# Patient Record
Sex: MALE | Race: WHITE | NOT HISPANIC OR LATINO | Employment: OTHER | URBAN - METROPOLITAN AREA
[De-identification: names, ages, dates, MRNs, and addresses within clinical notes are randomized per-mention and may not be internally consistent; named-entity substitution may affect disease eponyms.]

---

## 2017-01-06 PROBLEM — R93.89 ABNORMAL CT SCAN: Status: ACTIVE | Noted: 2017-01-06

## 2017-01-16 PROBLEM — C91.12 CLL (CHRONIC LYMPHOID LEUKEMIA) IN RELAPSE: Status: ACTIVE | Noted: 2017-01-16

## 2017-01-16 PROBLEM — D72.829 LEUKOCYTOSIS: Status: ACTIVE | Noted: 2017-01-16

## 2017-01-16 PROBLEM — N17.9 ACUTE KIDNEY INJURY: Status: ACTIVE | Noted: 2017-01-16

## 2017-01-16 PROBLEM — E86.1 INTRAVASCULAR VOLUME DEPLETION: Status: ACTIVE | Noted: 2017-01-16

## 2017-01-17 PROBLEM — E87.5 HYPERKALEMIA: Status: ACTIVE | Noted: 2017-01-17

## 2017-01-17 PROBLEM — E79.0 HYPERURICEMIA: Status: ACTIVE | Noted: 2017-01-17

## 2017-01-18 PROBLEM — E86.1 INTRAVASCULAR VOLUME DEPLETION: Status: RESOLVED | Noted: 2017-01-16 | Resolved: 2017-01-18

## 2017-02-01 PROBLEM — I10 HYPERTENSION: Status: ACTIVE | Noted: 2017-02-01

## 2017-02-21 ENCOUNTER — INFUSION (OUTPATIENT)
Dept: INFUSION THERAPY | Facility: HOSPITAL | Age: 77
End: 2017-02-21
Attending: INTERNAL MEDICINE
Payer: MEDICARE

## 2017-02-21 DIAGNOSIS — C91.10 LEUKEMIA, LYMPHOCYTIC, CHRONIC: Primary | ICD-10-CM

## 2017-02-21 PROCEDURE — 96523 IRRIG DRUG DELIVERY DEVICE: CPT | Mod: PN

## 2017-02-21 PROCEDURE — 25000003 PHARM REV CODE 250: Mod: PN | Performed by: INTERNAL MEDICINE

## 2017-02-21 RX ORDER — SODIUM CHLORIDE 0.9 % (FLUSH) 0.9 %
10 SYRINGE (ML) INJECTION
Status: DISCONTINUED | OUTPATIENT
Start: 2017-02-21 | End: 2017-02-21 | Stop reason: HOSPADM

## 2017-02-21 RX ORDER — HEPARIN 100 UNIT/ML
500 SYRINGE INTRAVENOUS
Status: COMPLETED | OUTPATIENT
Start: 2017-02-21 | End: 2017-02-21

## 2017-02-21 RX ORDER — SODIUM CHLORIDE 0.9 % (FLUSH) 0.9 %
10 SYRINGE (ML) INJECTION
Status: CANCELLED
Start: 2017-02-21

## 2017-02-21 RX ORDER — HEPARIN 100 UNIT/ML
500 SYRINGE INTRAVENOUS
Status: CANCELLED | OUTPATIENT
Start: 2017-02-21

## 2017-02-21 RX ADMIN — HEPARIN 500 UNITS: 100 SYRINGE at 11:02

## 2017-02-21 RX ADMIN — SODIUM CHLORIDE, PRESERVATIVE FREE 10 ML: 5 INJECTION INTRAVENOUS at 11:02

## 2017-03-08 ENCOUNTER — TELEPHONE (OUTPATIENT)
Dept: CARDIOLOGY | Facility: CLINIC | Age: 77
End: 2017-03-08

## 2017-03-08 NOTE — TELEPHONE ENCOUNTER
----- Message from Karen Sousa sent at 3/8/2017 10:57 AM CST -----  Contact: rafaela Delatorre calling for  Labs to be done prior to appt   Call back    Or

## 2017-03-08 NOTE — TELEPHONE ENCOUNTER
Spoke to pt, notified that labs are not needed prior to appt. Dr Clemente will orders labs at appt. Pt verbally understands

## 2017-03-23 PROBLEM — I49.9 ARRHYTHMIA: Status: ACTIVE | Noted: 2017-03-23

## 2017-03-23 PROBLEM — E78.00 HYPERCHOLESTEREMIA: Status: ACTIVE | Noted: 2017-03-23

## 2017-03-23 PROBLEM — I34.0 NON-RHEUMATIC MITRAL REGURGITATION: Status: ACTIVE | Noted: 2017-03-23

## 2017-03-23 PROBLEM — Z79.02 LONG TERM (CURRENT) USE OF ANTITHROMBOTICS/ANTIPLATELETS: Status: ACTIVE | Noted: 2017-03-23

## 2017-03-23 PROBLEM — I25.10 CORONARY ARTERY DISEASE INVOLVING NATIVE CORONARY ARTERY OF NATIVE HEART WITHOUT ANGINA PECTORIS: Status: ACTIVE | Noted: 2017-03-23

## 2017-03-27 ENCOUNTER — OFFICE VISIT (OUTPATIENT)
Dept: CARDIOLOGY | Facility: CLINIC | Age: 77
End: 2017-03-27
Payer: MEDICARE

## 2017-03-27 VITALS
HEART RATE: 71 BPM | HEIGHT: 69 IN | BODY MASS INDEX: 26.48 KG/M2 | WEIGHT: 178.81 LBS | SYSTOLIC BLOOD PRESSURE: 142 MMHG | DIASTOLIC BLOOD PRESSURE: 58 MMHG

## 2017-03-27 DIAGNOSIS — I49.9 CARDIAC ARRHYTHMIA, UNSPECIFIED CARDIAC ARRHYTHMIA TYPE: ICD-10-CM

## 2017-03-27 DIAGNOSIS — I25.10 CORONARY ARTERY DISEASE INVOLVING NATIVE CORONARY ARTERY OF NATIVE HEART WITHOUT ANGINA PECTORIS: Primary | ICD-10-CM

## 2017-03-27 DIAGNOSIS — E78.00 HYPERCHOLESTEREMIA: ICD-10-CM

## 2017-03-27 DIAGNOSIS — I10 ESSENTIAL HYPERTENSION: ICD-10-CM

## 2017-03-27 DIAGNOSIS — I34.0 NON-RHEUMATIC MITRAL REGURGITATION: ICD-10-CM

## 2017-03-27 PROCEDURE — 99213 OFFICE O/P EST LOW 20 MIN: CPT | Mod: PBBFAC,PO | Performed by: INTERNAL MEDICINE

## 2017-03-27 PROCEDURE — 99214 OFFICE O/P EST MOD 30 MIN: CPT | Mod: S$PBB,,, | Performed by: INTERNAL MEDICINE

## 2017-03-27 PROCEDURE — 99999 PR PBB SHADOW E&M-EST. PATIENT-LVL III: CPT | Mod: PBBFAC,,, | Performed by: INTERNAL MEDICINE

## 2017-03-27 RX ORDER — RAMIPRIL 1.25 MG/1
1.25 CAPSULE ORAL NIGHTLY
Qty: 90 CAPSULE | Refills: 1 | Status: SHIPPED | OUTPATIENT
Start: 2017-03-27 | End: 2017-09-21 | Stop reason: SDUPTHER

## 2017-03-27 RX ORDER — PRAVASTATIN SODIUM 10 MG/1
10 TABLET ORAL NIGHTLY
Qty: 90 TABLET | Refills: 1 | Status: SHIPPED | OUTPATIENT
Start: 2017-03-27 | End: 2017-11-07 | Stop reason: SDUPTHER

## 2017-03-27 NOTE — PATIENT INSTRUCTIONS
Understanding Coronary Artery Disease (CAD)    To understand coronary artery disease (CAD), you need to know how your heart works. Your heart is a muscle that pumps blood throughout your body. To work right, your heart needs a steady supply of oxygen. It gets this oxygen from blood supplied by the coronary arteries.        Healthy Artery      Damaged Artery      Narrowed Artery      Blocked Artery   Healthy artery. When a coronary artery is healthy and has no blockages, blood flows through easily. Healthy arteries can easily supply the oxygen-rich blood your heart needs.  Damaged artery. Coronary artery disease begins when damage to the artery lining leads to the buildup of fat-like substances and cholesterol along the artery wall. This is called plaque. This damage could be caused by things like high blood pressure or smoking. This plaque buildup begins to narrow the arteries carrying blood to the heart. This is called atherosclerosis,  Narrowed artery. As more plaque builds up, your artery has trouble supplying blood to your heart muscle when it needs it most, such as during exercise. You may not feel any symptoms when this happens. Or you may feel angina--pressure, tightness, achiness, or pain in your chest, jaw, neck, back, or arm.  Blocked artery. A piece of plaque may break off and completely block the artery. Or a blood clot may plug the narrowed artery. When this happens, blood flow is blocked from reaching the heart. Without oxygen-rich blood, part of the heart muscle becomes damaged and stops working. You may feel crushing pressure or pain in or around your chest. This is a heart attack (acute myocardial infarction, or AMI) and is a medical emergency.  Date Last Reviewed: 3/28/2016  © 6955-3976 [employer]. [address] [place] [address], [place] [address]. All rights reserved. This information is not intended as a substitute for professional medical care. Always follow your healthcare  professional's instructions.        Established High Blood Pressure    High blood pressure (hypertension) is a chronic disease. Often health care providers dont know what causes it. But it can be caused by certain health conditions and medicines.  If you have high blood pressure, you may not have any symptoms. If you do have symptoms, they may include headache, dizziness, changes in your vision, chest pain, and shortness of breath. But even without symptoms, high blood pressure thats not treated raises your risk for heart attack and stroke. High blood pressure is a serious health risk and shouldnt be ignored.  A blood pressure reading is made up of two numbers: a higher number over a lower number. The top number is the systolic pressure. The bottom number is the diastolic pressure. A normal blood pressure is less than 120 over less than 80.  High blood pressure is when either the top number is 140 or higher, or the bottom number is 90 or higher. This must be the result when taking your blood pressure a number of times. The blood pressures between normal and high are called prehypertension.  Home care  If you have high blood pressure, you should do what is listed below to lower your blood pressure. If you are taking medicines for high blood pressure, these methods may reduce or end your need for medicines in the future.  · Begin a weight-loss program if you are overweight.  · Cut back on how much salt you get in your diet. Heres how to do this:  ¨ Dont eat foods that have a lot of salt. These include olives, pickles, smoked meats, and salted potato chips.  ¨ Dont add salt to your food at the table.  ¨ Use only small amounts of salt when cooking.  · Begin an exercise program. Talk with your health care provider about the type of exercise program that would be best for you. It doesn't have to be hard. Even brisk walking for 20 minutes 3 times a week is a good form of exercise.  · Dont take medicines that have  heart stimulants. This includes many cold and sinus decongestant pills and sprays, as well as diet pills. Check the warnings about hypertension on the label. Stimulants such as amphetamine or cocaine could be lethal for someone with high blood pressure. Never take these.  · Limit how much caffeine you get in your diet. Switch to caffeine-free products.  · Stop smoking. If you are a long-time smoker, this can be hard. Enroll in a stop-smoking program to make it more likely that you will quit for good.  · Learn how to handle stress. This is an important part of any program to lower blood pressure. Learn about relaxation methods like meditation, yoga, or biofeedback.  · If your provider prescribed medicines, take them exactly as directed. Missing doses may cause your blood pressure get out of control.  · Consider buying an automatic blood pressure machine. You can get one of these at most pharmacies. Use this to watch your blood pressure at home. Give the results to your provider.  Follow-up care  You will need to make regular visits to your health care provider. This is to check your blood pressure and to make changes to your medicines. Make a follow-up appointment as directed.  When to seek medical advice  Call your health care provider right away if any of these occur:  · Chest pain or shortness of breath  · Severe headache  · Throbbing or rushing sound in the ears  · Nosebleed  · Sudden severe pain in your belly (abdomen)  · Extreme drowsiness, confusion, or fainting  · Dizziness or dizziness with a spinning sensation (vertigo)  · Weakness of an arm or leg or one side of the face  · You have problems speaking or seeing   Date Last Reviewed: 11/25/2014  © 8423-4682 [employer]. [address] [place] [address], [place] [address]. All rights reserved. This information is not intended as a substitute for professional medical care. Always follow your healthcare professional's instructions.        Understanding  Mitral Valve Regurgitation    Mitral valve regurgitation is when the mitral valve in the heart is leaky. It lets some blood flow backwards when the ventricle squeezes.  How mitral valve regurgitation happens  The mitral valve is one of the hearts 4 valves. Normally, these valves help the blood flow through the hearts 4 chambers and out to the body without leaking backwards. The mitral valve lies between the left atrium and the left ventricle. Normally, the mitral valve stops blood from flowing back into the left atrium from the left ventricle when the ventricle squeezes. This maximizes forward blood flow through the heart.  With mitral valve regurgitation, some blood leaks back through the valve. This makes the heart have to work harder to get blood out to your body. When this blood is regurgitated backwards in the heart, it increases the pressure within the heart which can lead to muscle damage as well as high blood pressure in the lungs.  Mitral valve regurgitation can increase risk for other heart rhythm problems, such as atrial fibrillation (AFib). This abnormal heart rhythm results in fast and irregular heartbeats which can also lower the heart's pumping ability and increases the risk for stroke.  Mitral valve regurgitation can be acute or chronic. If its acute, the valve suddenly becomes leaky. In this case, the heart doesnt have time to adapt to the leak in the valve. Symptoms are often severe. If its chronic, the valve leaks more and more over time. The heart has time to adapt to the leak.  What causes mitral valve regurgitation?  Mitral valve regurgitation can be caused by various things, such as:  · Heart attack or coronary artery disease  · Natural aging that can damage the mitral valve  · Tearing of the tissue or muscle that supports the mitral valve such as due to an injury  · A redundant or floppy mitral valve, called mitral valve prolapse  · Rheumatic heart disease caused by untreated  Streptococcus infection. Strep are the bacteria that cause strep throat.  · Certain autoimmune diseases, such as rheumatoid arthritis  · Infection of the heart valves (endocarditis)  · Problems with the mitral valve that are present at birth  · Certain medicines  You can reduce some risk factors for mitral valve regurgitation. For example:  · Use antibiotics as directed to treat a strep infection and prevent rheumatic heart disease.  · Reduce the risk for heart valve infection by not injecting illegal drugs.  · Manage risk factors that can lead to a heart attack or chronic heart artery disease.  There are other risk factors that you cant change. For example, some conditions that can lead to mitral valve regurgitation are partly genetic.  Symptoms of mitral valve regurgitation  Chronic mitral valve regurgitation often doesnt cause symptoms for a long time. Mild or moderate mitral regurgitation often doesnt cause any symptoms. If the condition becomes more severe, you may have symptoms. They may get worse and happen more often over time. Symptoms may include:  · Shortness of breath with physical activity  · Shortness of breath when lying flat  · Feeling tired  · Less ability to exercise  · Awareness of your heartbeat  · Swelling in your legs, abdomen, and the veins in your neck  · Chest pain (less common)  Acute, severe mitral valve regurgitation is a medical emergency that can cause serious symptoms such as:  · Symptoms of shock (pale skin, loss of consciousness, rapid breathing)  · Severe shortness of breath  · Arrhythmias that make the heart unable to pump blood well  Diagnosing mitral valve regurgitation  Your healthcare provider will ask about your health history. He or she will give you a physical exam. Using a stethoscope, your healthcare provider will listen to your heart. This is to check for sounds called heart murmurs and other signs that the heart isnt pumping normally. You may also have tests such  as:  · Echocardiogram, to look at the structure of the heart using sound waves  · Stress echocardiogram, to see how well the heart does during exercise  · Electrocardiogram (EKG), to check the hearts rhythm  · Cardiac MRI, transesophageal echocardiogram, or cardiac catheterization, if more information is needed  Date Last Reviewed: 6/1/2016  © 4775-3579 [employer]. [address] [place] [address]. All rights reserved. This information is not intended as a substitute for professional medical care. Always follow your healthcare professional's instructions.

## 2017-03-27 NOTE — PROGRESS NOTES
Subjective:    Patient ID:  Jan Kelly is a 76 y.o. male who presents for Coronary Artery Disease; Hypertension; and Hyperlipidemia      HPI discussed labs and goals, LDL  65, off losartan and simvastatin since started TX FOR CLL PER DR DEMARCO/ FRANCIS[facility] WHICH HAS MULTIPLE SE'S. , DOING OK,L SEE ROS    Past Medical History:   Diagnosis Date    Cancer     CLL    Cardiomyopathy     Chronic lymphocytic leukemia     Coronary artery disease     wih stents    Diabetes mellitus     Heart block     Heart murmur     Hyperlipidemia     Hypertension     Valvular regurgitation      Past Surgical History:   Procedure Laterality Date    CARDIAC CATHETERIZATION      CHOLECYSTECTOMY      COLONOSCOPY      COLONOSCOPY N/A 1/6/2017    Procedure: COLONOSCOPY;  Surgeon: Remy Pardo MD;  Location: [facility];  Service: Endoscopy;  Laterality: N/A;    CORONARY ANGIOPLASTY      CORONARY STENT PLACEMENT      PORTACATH PLACEMENT      TONSILLECTOMY       Family History   Problem Relation Age of Onset    Heart disease Father     Hypertension Father      Social History     Social History    Marital status:      Spouse name: N/A    Number of children: N/A    Years of education: N/A     Social History Main Topics    Smoking status: Former Smoker     Quit date: 1970    Smokeless tobacco: None    Alcohol use No      Comment: rarely    Drug use: No    Sexual activity: Not Asked     Other Topics Concern    None     Social History Narrative       Review of patient's allergies indicates:   Allergen Reactions    Penicillins     Titanium dioxide        Current Outpatient Prescriptions:     allopurinol (ZYLOPRIM) 300 MG tablet, Take 1 tablet (300 mg total) by mouth once daily., Disp: 30 tablet, Rfl: 2    aspirin (ECOTRIN) 81 MG EC tablet, Take 81 mg by mouth every morning. , Disp: , Rfl:     atenolol (TENORMIN) 25 MG tablet, Take 25 mg by mouth 2 (two) times daily. , Disp: , Rfl:     cyanocobalamin  (VITAMIN B-12) 1000 MCG tablet, Take 1,000 mcg by mouth once daily. , Disp: , Rfl:     HUMALOG KWIKPEN 100 unit/mL InPn pen, INJECT 1 UNIT UNDER THE SKIN PRF HIGH BLOOD SUGAR UTD, Disp: , Rfl: 3    hydrocodone-acetaminophen 10-325mg (NORCO)  mg Tab, Take 1 tablet by mouth nightly., Disp: , Rfl:     IMBRUVICA 140 mg Cap, , Disp: , Rfl:     pantoprazole (PROTONIX) 40 MG tablet, TK 1 T PO D 30 MIN B JIL, Disp: , Rfl: 3    simvastatin (ZOCOR) 20 MG tablet, Take 20 mg by mouth every evening., Disp: , Rfl:     Review of Systems   Constitution: Negative for chills, decreased appetite, diaphoresis, weakness, malaise/fatigue and night sweats. Weight loss: BETTER NOW.   HENT: Negative for congestion.    Eyes: Negative for blurred vision, discharge and visual disturbance.   Cardiovascular: Negative for chest pain, claudication, cyanosis, leg swelling, near-syncope, orthopnea, palpitations, paroxysmal nocturnal dyspnea and syncope. Dyspnea on exertion: MILD OCC. Irregular heartbeat: OCC.   Respiratory: Negative for cough, hemoptysis, sleep disturbances due to breathing, snoring, sputum production and wheezing.    Endocrine: Negative for cold intolerance and heat intolerance.   Hematologic/Lymphatic: Negative for adenopathy. Does not bruise/bleed easily.   Skin: Negative for color change, itching and nail changes.   Musculoskeletal: Positive for stiffness. Negative for back pain and falls.   Gastrointestinal: Negative for bloating, abdominal pain, constipation, dysphagia, flatus, heartburn, hematemesis, jaundice and melena.   Genitourinary: Negative for frequency, hematuria and incomplete emptying.   Neurological: Negative for brief paralysis, difficulty with concentration, dizziness, focal weakness, light-headedness, loss of balance, numbness, paresthesias and tremors.   Psychiatric/Behavioral: Negative for altered mental status, memory loss and substance abuse. The patient is not nervous/anxious.   "  Allergic/Immunologic: Negative for persistent infections.        Objective:      Vitals:    03/27/17 1321   BP: (!) 142/58   Pulse: 71   Weight: 81.1 kg (178 lb 12.7 oz)   Height: 5' 9" (1.753 m)   PainSc: 0-No pain     Body mass index is 26.4 kg/(m^2).    Physical Exam   Constitutional: He is oriented to person, place, and time. He appears well-developed and well-nourished.   HENT:   Head: Normocephalic and atraumatic.   Mouth/Throat: Oropharynx is clear and moist.   Eyes: Conjunctivae and EOM are normal. Pupils are equal, round, and reactive to light.   Neck: Neck supple. Normal carotid pulses, no hepatojugular reflux and no JVD present. Carotid bruit is not present. No tracheal deviation present. No thyromegaly present.   Cardiovascular: Normal rate and regular rhythm.  Exam reveals no gallop, no distant heart sounds, no friction rub and no midsystolic click.    Murmur heard.  High-pitched blowing early systolic murmur is present with a grade of 1/6  at the apex  Pulses:       Carotid pulses are 2+ on the right side, and 2+ on the left side.       Radial pulses are 2+ on the right side, and 2+ on the left side.        Femoral pulses are 2+ on the right side, and 2+ on the left side.       Popliteal pulses are 2+ on the right side, and 2+ on the left side.        Dorsalis pedis pulses are 2+ on the right side, and 2+ on the left side.        Posterior tibial pulses are 2+ on the right side, and 2+ on the left side.   Pulmonary/Chest: Effort normal and breath sounds normal. No tachypnea and no bradypnea.   Abdominal: Soft. Bowel sounds are normal. He exhibits no distension and no mass. There is no tenderness.   Musculoskeletal: Normal range of motion. He exhibits no edema.   Lymphadenopathy:     He has no cervical adenopathy.     He has no axillary adenopathy.   Neurological: He is alert and oriented to person, place, and time. No cranial nerve deficit. Coordination normal.   Skin: Skin is warm and dry. No " erythema.   Psychiatric: He has a normal mood and affect. His speech is normal. Judgment and thought content normal. He is agitated.               ..    Chemistry        Component Value Date/Time     03/17/2017 0900    K 4.2 03/17/2017 0900     03/17/2017 0900    CO2 30 03/17/2017 0900    BUN 16 03/17/2017 0900    CREATININE 0.94 03/17/2017 0900    GLU 87 03/17/2017 0900        Component Value Date/Time    CALCIUM 9.1 03/17/2017 0900    ALKPHOS 77 03/17/2017 0900    AST 18 03/17/2017 0900    AST 33 02/18/2016 1158    ALT 16 03/17/2017 0900    BILITOT 0.7 03/17/2017 0900            ..  Lab Results   Component Value Date    CHOL 114 (L) 03/17/2017    CHOL 135 08/19/2016    CHOL 164 05/16/2016     Lab Results   Component Value Date    HDL 40 03/17/2017    HDL 32 (L) 08/19/2016    HDL 39 (L) 05/16/2016     Lab Results   Component Value Date    LDLCALC 65.8 03/17/2017    LDLCALC 85.4 08/19/2016    LDLCALC 108.0 05/16/2016     Lab Results   Component Value Date    TRIG 41 03/17/2017    TRIG 88 08/19/2016    TRIG 85 05/16/2016     Lab Results   Component Value Date    CHOLHDL 35.1 03/17/2017    CHOLHDL 23.7 08/19/2016    CHOLHDL 23.8 05/16/2016     ..  Lab Results   Component Value Date    .00 (HH) 03/17/2017    HGB 9.7 (L) 03/17/2017    HCT 34.4 (L) 03/17/2017     (H) 03/17/2017    PLT 73 (L) 03/17/2017       Test(s) Reviewed  I have reviewed the following in detail:  [] Stress test   [] Angiography   [] Echocardiogram   [x] Labs   [x] Other:       Assessment:         ICD-10-CM ICD-9-CM   1. Coronary artery disease involving native coronary artery of native heart without angina pectoris I25.10 414.01   2. Essential hypertension I10 401.9   3. Non-rheumatic mitral regurgitation I34.0 424.0   4. Cardiac arrhythmia, unspecified cardiac arrhythmia type I49.9 427.9   5. Hypercholesteremia E78.00 272.0     Problem List Items Addressed This Visit        Cardiology Problems    Essential hypertension     Coronary artery disease involving native coronary artery of native heart without angina pectoris - Primary    Non-rheumatic mitral regurgitation    Hypercholesteremia    Arrhythmia           Plan:         ADD LOW DOSE ACE-I AND PRAVACHOL FOR CAD, HTN, , ALL OTHER CV CLINICALLY STABLE, NO ANGINA OR OCC , NO HF, NO TIA, NO CLINICAL ARRHYTHMIA,CONTINUE CURRENT MEDS, EDUCATION, DIET, EXERCISE  RTC IN 6 MO WITH LABS, BMP IN 3 WEEKS,  Coronary artery disease involving native coronary artery of native heart without angina pectoris    Essential hypertension    Non-rheumatic mitral regurgitation    Cardiac arrhythmia, unspecified cardiac arrhythmia type    Hypercholesteremia    RTC Low level/low impact aerobic exercise 5x's/wk. Heart healthy diet and risk factor modification.    See labs and med orders.    Aerobic exercise 5x's/wk. Heart healthy diet and risk factor modification.    See labs and med orders.

## 2017-03-27 NOTE — PROGRESS NOTES
Subjective:    Patient ID:  Jan Kelly is a 76 y.o. male who presents for Coronary Artery Disease; Hypertension; and Hyperlipidemia      HPI    Past Medical History:   Diagnosis Date    Cancer     CLL    Cardiomyopathy     Chronic lymphocytic leukemia     Coronary artery disease     wih stents    Diabetes mellitus     Heart block     Heart murmur     Hyperlipidemia     Hypertension     Valvular regurgitation      Past Surgical History:   Procedure Laterality Date    CARDIAC CATHETERIZATION      CHOLECYSTECTOMY      COLONOSCOPY      COLONOSCOPY N/A 1/6/2017    Procedure: COLONOSCOPY;  Surgeon: Remy Pardo MD;  Location: [facility];  Service: Endoscopy;  Laterality: N/A;    CORONARY ANGIOPLASTY      CORONARY STENT PLACEMENT      PORTACATH PLACEMENT      TONSILLECTOMY       Family History   Problem Relation Age of Onset    Heart disease Father     Hypertension Father      Social History     Social History    Marital status:      Spouse name: N/A    Number of children: N/A    Years of education: N/A     Social History Main Topics    Smoking status: Former Smoker     Quit date: 1970    Smokeless tobacco: None    Alcohol use No      Comment: rarely    Drug use: No    Sexual activity: Not Asked     Other Topics Concern    None     Social History Narrative       Review of patient's allergies indicates:   Allergen Reactions    Penicillins     Titanium dioxide        Current Outpatient Prescriptions:     allopurinol (ZYLOPRIM) 300 MG tablet, Take 1 tablet (300 mg total) by mouth once daily., Disp: 30 tablet, Rfl: 2    aspirin (ECOTRIN) 81 MG EC tablet, Take 81 mg by mouth every morning. , Disp: , Rfl:     atenolol (TENORMIN) 25 MG tablet, Take 25 mg by mouth 2 (two) times daily. , Disp: , Rfl:     cyanocobalamin (VITAMIN B-12) 1000 MCG tablet, Take 1,000 mcg by mouth once daily. , Disp: , Rfl:     HUMALOG KWIKPEN 100 unit/mL InPn pen, INJECT 1 UNIT UNDER THE SKIN PRF  "HIGH BLOOD SUGAR UTD, Disp: , Rfl: 3    hydrocodone-acetaminophen 10-325mg (NORCO)  mg Tab, Take 1 tablet by mouth nightly., Disp: , Rfl:     IMBRUVICA 140 mg Cap, , Disp: , Rfl:     pantoprazole (PROTONIX) 40 MG tablet, TK 1 T PO D 30 MIN B JIL, Disp: , Rfl: 3    simvastatin (ZOCOR) 20 MG tablet, Take 20 mg by mouth every evening., Disp: , Rfl:     ROS     Objective:      Vitals:    03/27/17 1321   BP: (!) 142/58   Pulse: 71   Weight: 81.1 kg (178 lb 12.7 oz)   Height: 5' 9" (1.753 m)   PainSc: 0-No pain     Body mass index is 26.4 kg/(m^2).    Physical Exam   Constitutional: He is oriented to person, place, and time. He appears well-developed and well-nourished.   HENT:   Head: Normocephalic and atraumatic.   Mouth/Throat: Oropharynx is clear and moist.   Eyes: Conjunctivae and EOM are normal. Pupils are equal, round, and reactive to light.   Neck: Neck supple. Normal carotid pulses, no hepatojugular reflux and no JVD present. Carotid bruit is not present. No tracheal deviation present. Edema: TRACE  No thyromegaly present.   Cardiovascular: Normal rate and regular rhythm.  Exam reveals no gallop, no distant heart sounds, no friction rub and no midsystolic click.    Murmur heard.   Medium-pitched blowing early systolic murmur is present with a grade of 1/6  at the apex  Pulses:       Carotid pulses are 2+ on the right side, and 2+ on the left side.       Radial pulses are 2+ on the right side, and 2+ on the left side.        Femoral pulses are 2+ on the right side, and 2+ on the left side.       Popliteal pulses are 2+ on the right side, and 2+ on the left side.        Dorsalis pedis pulses are 2+ on the right side, and 2+ on the left side.        Posterior tibial pulses are 2+ on the right side, and 2+ on the left side.   Pulmonary/Chest: Effort normal and breath sounds normal. No tachypnea and no bradypnea.   Abdominal: Soft. Bowel sounds are normal. He exhibits no distension and no mass. There is no " hepatosplenomegaly. There is no tenderness. There is no CVA tenderness.   Musculoskeletal: Normal range of motion. He exhibits no edema.   Lymphadenopathy:     He has no cervical adenopathy.     He has no axillary adenopathy.   Neurological: He is alert and oriented to person, place, and time. He has normal strength. No cranial nerve deficit. Coordination normal.   Skin: Skin is warm and dry. No cyanosis or erythema. No pallor.   Psychiatric: He has a normal mood and affect. His speech is normal and behavior is normal. Judgment and thought content normal.               ..    Chemistry        Component Value Date/Time     03/17/2017 0900    K 4.2 03/17/2017 0900     03/17/2017 0900    CO2 30 03/17/2017 0900    BUN 16 03/17/2017 0900    CREATININE 0.94 03/17/2017 0900    GLU 87 03/17/2017 0900        Component Value Date/Time    CALCIUM 9.1 03/17/2017 0900    ALKPHOS 77 03/17/2017 0900    AST 18 03/17/2017 0900    AST 33 02/18/2016 1158    ALT 16 03/17/2017 0900    BILITOT 0.7 03/17/2017 0900            ..  Lab Results   Component Value Date    CHOL 114 (L) 03/17/2017    CHOL 135 08/19/2016    CHOL 164 05/16/2016     Lab Results   Component Value Date    HDL 40 03/17/2017    HDL 32 (L) 08/19/2016    HDL 39 (L) 05/16/2016     Lab Results   Component Value Date    LDLCALC 65.8 03/17/2017    LDLCALC 85.4 08/19/2016    LDLCALC 108.0 05/16/2016     Lab Results   Component Value Date    TRIG 41 03/17/2017    TRIG 88 08/19/2016    TRIG 85 05/16/2016     Lab Results   Component Value Date    CHOLHDL 35.1 03/17/2017    CHOLHDL 23.7 08/19/2016    CHOLHDL 23.8 05/16/2016     ..  Lab Results   Component Value Date    .00 (HH) 03/17/2017    HGB 9.7 (L) 03/17/2017    HCT 34.4 (L) 03/17/2017     (H) 03/17/2017    PLT 73 (L) 03/17/2017       Test(s) Reviewed  I have reviewed the following in detail:  [] Stress test   [] Angiography   [] Echocardiogram   [] Labs   [] Other:       Assessment:         ICD-10-CM  ICD-9-CM   1. Coronary artery disease involving native coronary artery of native heart without angina pectoris I25.10 414.01   2. Essential hypertension I10 401.9   3. Non-rheumatic mitral regurgitation I34.0 424.0   4. Cardiac arrhythmia, unspecified cardiac arrhythmia type I49.9 427.9   5. Hypercholesteremia E78.00 272.0     Problem List Items Addressed This Visit        Cardiology Problems    Essential hypertension    Coronary artery disease involving native coronary artery of native heart without angina pectoris - Primary    Non-rheumatic mitral regurgitation    Hypercholesteremia    Arrhythmia           Plan:     START LOW DOSE RAMIPRIL AND PRAVACHOL, EDUCATION, ALL CV CLINICALLY STABLE, NO ANGINA OR RARE , NO HF, NO TIA, NO CLINICAL ARRHYTHMIA,CONTINUE CURRENT MEDS, EDUCATION, DIET, EXERCISE  RTC IN 6 MO WITH LABS      Coronary artery disease involving native coronary artery of native heart without angina pectoris    Essential hypertension    Non-rheumatic mitral regurgitation    Cardiac arrhythmia, unspecified cardiac arrhythmia type    Hypercholesteremia    RTC Low level/low impact aerobic exercise 5x's/wk. Heart healthy diet and risk factor modification.    See labs and med orders.    Aerobic exercise 5x's/wk. Heart healthy diet and risk factor modification.    See labs and med orders.

## 2017-03-30 ENCOUNTER — INFUSION (OUTPATIENT)
Dept: INFUSION THERAPY | Facility: HOSPITAL | Age: 77
End: 2017-03-30
Attending: INTERNAL MEDICINE
Payer: MEDICARE

## 2017-03-30 DIAGNOSIS — C91.10 LEUKEMIA, LYMPHOCYTIC, CHRONIC: Primary | ICD-10-CM

## 2017-03-30 PROCEDURE — 25000003 PHARM REV CODE 250: Mod: PN | Performed by: INTERNAL MEDICINE

## 2017-03-30 PROCEDURE — 96523 IRRIG DRUG DELIVERY DEVICE: CPT | Mod: PN

## 2017-03-30 RX ORDER — HEPARIN 100 UNIT/ML
500 SYRINGE INTRAVENOUS
Status: CANCELLED | OUTPATIENT
Start: 2017-03-30

## 2017-03-30 RX ORDER — HEPARIN 100 UNIT/ML
500 SYRINGE INTRAVENOUS
Status: COMPLETED | OUTPATIENT
Start: 2017-03-30 | End: 2017-03-30

## 2017-03-30 RX ORDER — SODIUM CHLORIDE 0.9 % (FLUSH) 0.9 %
10 SYRINGE (ML) INJECTION
Status: DISCONTINUED | OUTPATIENT
Start: 2017-03-30 | End: 2017-03-30 | Stop reason: HOSPADM

## 2017-03-30 RX ORDER — SODIUM CHLORIDE 0.9 % (FLUSH) 0.9 %
10 SYRINGE (ML) INJECTION
Status: CANCELLED
Start: 2017-03-30

## 2017-03-30 RX ADMIN — SODIUM CHLORIDE, PRESERVATIVE FREE 10 ML: 5 INJECTION INTRAVENOUS at 12:03

## 2017-03-30 RX ADMIN — HEPARIN 500 UNITS: 100 SYRINGE at 12:03

## 2017-05-08 ENCOUNTER — OFFICE VISIT (OUTPATIENT)
Dept: CARDIOLOGY | Facility: CLINIC | Age: 77
End: 2017-05-08
Payer: MEDICARE

## 2017-05-08 VITALS
BODY MASS INDEX: 26.48 KG/M2 | HEART RATE: 71 BPM | DIASTOLIC BLOOD PRESSURE: 69 MMHG | HEIGHT: 69 IN | SYSTOLIC BLOOD PRESSURE: 156 MMHG | WEIGHT: 178.81 LBS

## 2017-05-08 DIAGNOSIS — R60.0 ARM EDEMA: Primary | ICD-10-CM

## 2017-05-08 DIAGNOSIS — I10 ESSENTIAL HYPERTENSION: ICD-10-CM

## 2017-05-08 PROCEDURE — 99213 OFFICE O/P EST LOW 20 MIN: CPT | Mod: PBBFAC,PO | Performed by: INTERNAL MEDICINE

## 2017-05-08 PROCEDURE — 99999 PR PBB SHADOW E&M-EST. PATIENT-LVL III: CPT | Mod: PBBFAC,,, | Performed by: INTERNAL MEDICINE

## 2017-05-08 PROCEDURE — 99213 OFFICE O/P EST LOW 20 MIN: CPT | Mod: S$PBB,,, | Performed by: INTERNAL MEDICINE

## 2017-05-08 NOTE — PATIENT INSTRUCTIONS
Taking Your Blood Pressure  Blood pressure is the force of blood against the artery wall as it moves from the heart through the blood vessels. You can take your own blood pressure reading using a digital monitor. Take readings as often as your healthcare provider instructs. Take each reading at the same time of day.  Step 1. Relax    · Take your blood pressure at the same time every day, such as in the morning or evening, or at the time your healthcare provider recommends.  · Wait at least a half-hour after smoking, eating, drinking caffeinated beverages, or exercising.  · Sit comfortably at a table with both feet on the floor. Do not cross your legs or feet. Place the monitor near you.  · Rest for a few minutes before you begin.  Step 2. Wrap the cuff    · Place your arm on the table, palm up. Your arm should be at the level of your heart. Wrap the cuff around your upper arm, just above your elbow. Its best done on bare skin, not over clothing. Most cuffs will indicate where the brachial artery (the blood vessel in the middle of the arm at the inner side of the elbow) should line up with the cuff. Look in your monitor's instruction booklet for an illustration. You can also bring your cuff to your healthcare provider and have them show you how to correctly place the cuff.  · Make sure your cuff fits. If it doesnt wrap around your upper arm, order a larger cuff.  Step 3. Inflate the cuff    · Pump the cuff until the scale reads 160. If you have a self-inflating cuff, push the button that starts the pump.  · The cuff will tighten, then loosen.  · The numbers will change. When they stop changing, your blood pressure reading will appear.  · Take 2 or 3 readings one minute apart.  Step 4. Write down the results of each reading    · Write down your blood pressure numbers for each reading. Note the date and time. Keep your results in one place, such as a notebook. Even if your monitor has a built-in memory, keep a hard  copy of the readings.  · Remove the cuff from your arm. Turn off the machine.  · Share your blood pressure records with your healthcare providers at each visit.  Date Last Reviewed: 4/27/2016 © 2000-2016 [employer]. [address] [place] [address], [place] [address]. All rights reserved. This information is not intended as a substitute for professional medical care. Always follow your healthcare professional's instructions.        Heart Disease Education    The heart beats 60 to 100 times per minute, 24 hours a day. This equals almost 1000,000 times a day. It pumps blood with oxygen and nutrients to the tissues and organs of the body. But the heart is a muscle and needs its own supply of blood. Blood flow to the heart is supplied by the coronary arteries. Coronary artery disease (atherosclerosis) is a result of cholesterol, saturated fat, and calcium deposits (plaques) that build up inside the walls. This causes inflammation within the coronary arteries. These plaques narrow the artery and reduce blood flow to the heart muscle. The reduction in blood flow to the heart muscle decreases oxygen supply to the heart. If the narrowing is significant enough, the oxygen supply to one or more regions of the heart can be temporarily or permanently shut down. This can cause chest pain, and possibly death of heart tissue (heart attack).  Types of chest pain  Angina is the name for pain in the heart muscle. Angina is a warning sign of serious heart disease. When untreated it can lead to a heart attack, also known as acute myocardial infarction, or AMI. Angina occurs when there is not enough blood and oxygen flowing to the heart for the amount of work it is doing. This most often happens during physical exertion, when the heart is working hardest. It is usually relieved by rest or nitroglycerin. Angina may also occur after a large meal when extra blood is sent to the digestive organs and less goes to the heart. In the case  of advanced or unstable heart disease, angina can occur at rest or awaken you from sleep. Angina usually lasts from a few minutes up to 20 minutes or more. When treated early, the effects of angina can be reversed without permanent damage to the heart. Angina is a serious condition and needs to be evaluated by a medical professional immediately.  There are two types of angina -- stable and unstable:  · Stable angina usually occurs with a predictable level of activity. Being stable, its character, severity, and occurrence do not change much over time. It usually starts with activity, and resolves with rest or taking your medicine as instructed by your doctor. The symptoms usually do not last long.  · Unstable angina changes or gets worse over time. It is different from whatever you are used to. It may feel different or worse, begin without cause, occur with exercise or exertion, wake you up from sleep, and last longer. It may not respond in the same way as it does when you take your usual medicines for an attack. This type of angina can be a warning sign of an impending heart attack.     A heart attack is usually the result of a blood clot that suddenly forms in a coronary artery that has been narrowed with plaque. When this occurs, blood flow may be cut off to a part of the heart muscle, causing the cells to die. This weakens the pumping action of the heart, which affects the delivery of blood to all the other organs in the body including the brain. This damage is not reversible. However, early treatment can limit the amount of damage.  The pain you feel with angina and a heart attack may have a similar quality. However, it is usually different in intensity and duration. Here are some typical descriptions of a heart attack:  · It is most often experienced as a squeezing, crushing, pressure-like sensation in the center of the chest.  · It is sometimes described as something heavy sitting on my chest.  · It may feel  "more like a bad case of indigestion.  · The pain may spread from the chest to the arm, shoulder, throat or jaw.  · Sometimes the pain is not felt in the chest at all, but only in the arm, shoulder, throat or jaw.  · There may also be nausea, vomiting, dizziness or light-headedness, sweating and trouble breathing.  · Palpitations, or your heart beating rapidly  · A new, irregular heart beat  · Unexplained weakness  You may not be able to tell the difference between "bad" angina and a heart attack at home. Seek help if your symptoms are different than usual. Do not be in denial or just try to "tough it out."  Call 911  This is the fastest and safest way to get to the emergency department. The paramedics can also start treatment on the way to the hospital, saving valuable time for your heart.  · If the angina gets worse, if it continues, or if it stops and returns, call 911 immediately. Do not delay. You may be having a heart attack.  · After you call 911, take a second tablet or spray unless instructed otherwise. When repeating doses, sit down if possible, because it can make you feel lightheaded or dizzy. Wait another 5 minutes. If the angina still does not go away, take a third tablet or spray. Do not take more than 3 tablets or sprays within 15 minutes. Stay on the phone with 911 for further instruction.  · Your healthcare provider may give you slightly different instructions than those above. If so, follow them carefully.  Do not wait until symptoms become severe to call 911.  Other reasons to call 911 include:  · Trouble breathing  · Feeling lightheaded, faint, or dizzy  · Rapid heart beat  · Slower than usual heart rate compared to your normal  · Angina with weakness, dizziness, fainting, heavy sweating, nausea, or vomiting  · Extreme drowsiness, confusion  · Weakness of an arm or leg or one side of the face  · Difficulty with speech or vision  When to seek medical care  Remember, the signs and symptoms of a " "heart attack are not always like they are on TV. Sometimes they are not so obvious. You may only feel weak, or just not right. If it is not clear or if you have any doubt, call for advice.  · Seek help if there is a change in the type of pain, if it feels different, or if your symptoms are mild.  · Do not drive yourself. Have someone else drive you. If no one can drive, call 911.  · Do not delay. Fast diagnosis and treatment can prevent or limit the amount of heart damage during a heart attack.  · Do not go to your doctor's office or a clinic as they may not be able to provide all the testing and treatment required for this condition.  · If your doctor has given you medicine to take when symptoms occur, take them but don't delay getting help trying to locate medicines.  What happens in the emergency department  The emergency department is connected to your local emergency medical system (EMS) through 911. That's why during a cardiac emergency, calling 911 is the fastest way to get help. The goal of the emergency department is to rapidly screen, evaluate, and treat people.  Once you are there, an electrocardiogram (ECG or heart tracing) will be done. Blood samples may be taken to look for the presence of heart enzymes that leak from damaged heart cells and show if a heart attack is occurring. You will often be evaluated by a heart specialist (cardiologist) who decides the best course of action. In the case of severe angina or early heart attack, and depending on the circumstances, powerful "clot busting" medicines can be used to dissolve blood clots in the coronary artery. In other cases, you may be taken to a cardiac catheterization lab. Here, a tiny balloon-tipped catheter is advanced through blood vessels to the heart. There the balloon is inflated pushing open the blood vessel restoring blood flow.  Risk factors for heart disease  Risk factors for heart disease are a combination of genetic and lifestyle. Many " risk factors work by either directly or indirectly damaging the blood vessels of the heart, or by increasing the risk of forming blood or cholesterol clots, which then clog up and block the arteries.     Examples of physical lifestyle risk factors:  · Cigarette smoking  · High blood pressure  · High blood cholesterol  · Use of stimulant drugs such as cocaine, crack, and amphetamines  · Eating a high-fat, high-cholesterol meal  · Diabetes   · Obesity which increases risk for diabetes and high blood pressure  · Lack of regular physical activity     Examples of emotional lifestyle factors:  · Chronic high stress levels release stress hormones. These raise blood pressure and cholesterol level and makes blood clot more easily.  · Held-in anger, hostile or cynical attitude  · Social and emotional isolation, lack of intimacy  · Loss of relationship  · Depression  Other factors that increase the risk of heart attack that you cannot control :  · Age. The older you get beyond 40, the greater is your risk of significant coronary artery disease.  · Gender. More men than women get heart disease; but once past menopause, women who are not taking estrogen replacement have the same risk as men for a heart attack.  · Family history. If your mother, father, brother or sister has coronary artery disease, your risk of having it is higher than a person your age without this family history.  What can you do to decrease your risk  To reduce your risk of heart disease:  · Get regular checkups with your doctor.  · Take your medicines for blood pressure, cholesterol or diabetes as directed.  · Watch your diet. Eat a heart healthy diet choosing fresh foods, less salt, cholesterol, and fat  · Stop smoking. Get help if needed.  · Get regular exercise.  · Manage stress.  · Carry a list of medicines and doses in your wallet.  Date Last Reviewed: 12/30/2015  © 4752-8235 [employer]. [address] [place] [address], [place] [address]. All  rights reserved. This information is not intended as a substitute for professional medical care. Always follow your healthcare professional's instructions.

## 2017-05-08 NOTE — PROGRESS NOTES
Subjective:    Patient ID:  Jan Kelly is a 76 y.o. male who presents for Coronary Artery Disease and Arm Swelling      HPI  REQUESTED OV, R ARM EDEMA/HAND, WORSE FOR 2 WEEKS, STATES ? SINCE STARTED IMBRUVICA FOR LEUKEMIA, WENT TO ER IN FEB, R ARM FEELS HEAVY,  SAW DR DEMARCO ONCOLOGIST, TO SEE AGAIN THIS WEEK, HAS BEEN OFF LOSARTAN, BP UP,HAD TROUBLE WITH VEIN DRAWING IN ER/ R ARM, ALSO HAS BEEN HAVING  ARM PAIN, SEE ROS  Past Medical History:   Diagnosis Date    Cancer     CLL    Cardiomyopathy     Chronic lymphocytic leukemia     Coronary artery disease     wih stents    Diabetes mellitus     Heart block     Heart murmur     Hyperlipidemia     Hypertension     Valvular regurgitation      Past Surgical History:   Procedure Laterality Date    CARDIAC CATHETERIZATION      CHOLECYSTECTOMY      COLONOSCOPY      COLONOSCOPY N/A 1/6/2017    Procedure: COLONOSCOPY;  Surgeon: Remy Pardo MD;  Location: [facility];  Service: Endoscopy;  Laterality: N/A;    CORONARY ANGIOPLASTY      CORONARY STENT PLACEMENT      PORTACATH PLACEMENT      TONSILLECTOMY       Family History   Problem Relation Age of Onset    Heart disease Father     Hypertension Father      Social History     Social History    Marital status:      Spouse name: N/A    Number of children: N/A    Years of education: N/A     Social History Main Topics    Smoking status: Former Smoker     Quit date: 1970    Smokeless tobacco: Never Used    Alcohol use No      Comment: rarely    Drug use: No    Sexual activity: Not Asked     Other Topics Concern    None     Social History Narrative       Review of patient's allergies indicates:   Allergen Reactions    Penicillins     Titanium dioxide        Current Outpatient Prescriptions:     aspirin (ECOTRIN) 81 MG EC tablet, Take 81 mg by mouth every morning. , Disp: , Rfl:     atenolol (TENORMIN) 25 MG tablet, Take 25 mg by mouth 2 (two) times daily. , Disp: , Rfl:      HUMALOG KWIKPEN 100 unit/mL InPn pen, INJECT 1 UNIT UNDER THE SKIN PRF HIGH BLOOD SUGAR UTD, Disp: , Rfl: 3    hydrocodone-acetaminophen 10-325mg (NORCO)  mg Tab, Take 1 tablet by mouth nightly., Disp: 40 tablet, Rfl: 0    IMBRUVICA 140 mg Cap, Take 3 capsules by mouth once daily at 6am. , Disp: , Rfl:     pantoprazole (PROTONIX) 40 MG tablet, TK 1 T PO D 30 MIN B JIL, Disp: , Rfl: 3    pravastatin (PRAVACHOL) 10 MG tablet, Take 1 tablet (10 mg total) by mouth every evening., Disp: 90 tablet, Rfl: 1    cyanocobalamin (VITAMIN B-12) 1000 MCG tablet, Take 1,000 mcg by mouth once daily. , Disp: , Rfl:     ramipril (ALTACE) 1.25 MG capsule, Take 1 capsule (1.25 mg total) by mouth every evening., Disp: 90 capsule, Rfl: 1    Review of Systems   Constitution: Negative for chills, decreased appetite, diaphoresis, fever, weakness, malaise/fatigue and night sweats. Weight loss: BETTER NOW.   HENT: Negative for congestion.    Eyes: Negative for blurred vision and visual disturbance.   Cardiovascular: Negative for chest pain, claudication, cyanosis, near-syncope, orthopnea, palpitations, paroxysmal nocturnal dyspnea and syncope. Dyspnea on exertion: MILD OCC. Irregular heartbeat: OCC. Leg swelling: R ARM EDEMA, occ legs.   Respiratory: Negative for cough, hemoptysis, sleep disturbances due to breathing, snoring, sputum production and wheezing.    Endocrine: Negative for cold intolerance and heat intolerance.   Hematologic/Lymphatic: Negative for bleeding problem. Adenopathy: IMPROVED WITH TX. Does not bruise/bleed easily.   Skin: Negative for color change, itching and nail changes.   Musculoskeletal: Negative for back pain and falls. Arthritis: mild.   Gastrointestinal: Negative for bloating, abdominal pain, constipation, dysphagia, flatus, heartburn, hematemesis, jaundice and melena.   Genitourinary: Negative for frequency, hematuria and incomplete emptying.   Neurological: Negative for brief paralysis, difficulty  "with concentration, dizziness (occ), focal weakness, light-headedness, loss of balance, numbness, paresthesias and tremors.   Psychiatric/Behavioral: Negative for altered mental status, memory loss and substance abuse. The patient is not nervous/anxious.    Allergic/Immunologic: Negative for persistent infections.        Objective:      Vitals:    05/08/17 1432   BP: (!) 156/69   Pulse: 71   Weight: 81.1 kg (178 lb 12.7 oz)   Height: 5' 9" (1.753 m)   PainSc:   4   PainLoc: Hand     Body mass index is 26.4 kg/(m^2).    Physical Exam   Constitutional: He is oriented to person, place, and time. He appears well-developed and well-nourished.   HENT:   Head: Normocephalic and atraumatic.   Mouth/Throat: Oropharynx is clear and moist.   Eyes: Conjunctivae and EOM are normal. Pupils are equal, round, and reactive to light.   Neck: Neck supple. Normal carotid pulses, no hepatojugular reflux and no JVD present. Carotid bruit is not present. No tracheal deviation present. No thyromegaly present.   Cardiovascular: Normal rate and regular rhythm.  Exam reveals no gallop, no distant heart sounds, no friction rub and no midsystolic click.      High-pitched blowing early systolic murmur is present  at the apex R ARM +2 EDEMA, MINIMAL ERYTHEMA  Pulses:       Carotid pulses are 2+ on the right side, and 2+ on the left side.       Radial pulses are 2+ on the right side, and 2+ on the left side.        Femoral pulses are 2+ on the right side, and 2+ on the left side.       Dorsalis pedis pulses are 2+ on the right side, and 2+ on the left side.        Posterior tibial pulses are 2+ on the right side, and 2+ on the left side.   NORMAL CAPILLARY REFILL, NO ISCHEMIA, EDEMA BELOW ELBOW , MOSTLY IN HAND   Pulmonary/Chest: Effort normal and breath sounds normal. No tachypnea and no bradypnea.   Abdominal: Soft. Bowel sounds are normal. He exhibits no distension and no mass. There is no tenderness.   Musculoskeletal: Normal range of motion. " He exhibits no edema.   Lymphadenopathy:     He has no cervical adenopathy.     He has no axillary adenopathy.   Neurological: He is alert and oriented to person, place, and time. No cranial nerve deficit. Coordination normal.   Skin: Skin is warm and dry. No erythema.   Psychiatric: He has a normal mood and affect. His speech is normal. Judgment and thought content normal. He is agitated.               ..    Chemistry        Component Value Date/Time     03/29/2017 1304    K 4.2 03/29/2017 1304     03/29/2017 1304    CO2 29 03/29/2017 1304    BUN 24 (H) 03/29/2017 1304    CREATININE 0.83 03/29/2017 1304     (H) 03/29/2017 1304        Component Value Date/Time    CALCIUM 9.2 03/29/2017 1304    ALKPHOS 78 03/29/2017 1304    AST 20 03/29/2017 1304    AST 33 02/18/2016 1158    ALT 24 03/29/2017 1304    BILITOT 0.7 03/29/2017 1304            ..  Lab Results   Component Value Date    CHOL 114 (L) 03/17/2017    CHOL 135 08/19/2016    CHOL 164 05/16/2016     Lab Results   Component Value Date    HDL 40 03/17/2017    HDL 32 (L) 08/19/2016    HDL 39 (L) 05/16/2016     Lab Results   Component Value Date    LDLCALC 65.8 03/17/2017    LDLCALC 85.4 08/19/2016    LDLCALC 108.0 05/16/2016     Lab Results   Component Value Date    TRIG 41 03/17/2017    TRIG 88 08/19/2016    TRIG 85 05/16/2016     Lab Results   Component Value Date    CHOLHDL 35.1 03/17/2017    CHOLHDL 23.7 08/19/2016    CHOLHDL 23.8 05/16/2016     ..  Lab Results   Component Value Date    .50 (HH) 03/29/2017    HGB 10.6 (L) 03/29/2017    HCT 36.9 (L) 03/29/2017     (H) 03/29/2017    PLT 96 (L) 03/29/2017       Test(s) Reviewed  I have reviewed the following in detail:  [] Stress test   [] Angiography   [] Echocardiogram   [] Labs   [x] Other:       Assessment:         ICD-10-CM ICD-9-CM   1. Arm edema R60.0 782.3   2. Essential hypertension I10 401.9     Problem List Items Addressed This Visit        Cardiology Problems    Essential  hypertension       Other    Arm edema - Primary    Relevant Orders    Cardiology Lab US Upper Extremity Veins Right           Plan:         CHECK VENOUS DOPPLER ASAP, TO SEE DR DEMARCO,THIS WEEK,  MIGHT NEED TO CHANGE MEDS, COMPLIANCE WITH BP MEDS, UNDERSTANDS RISK, ALL CV CLINICALLY STABLE, NO ANGINA, NO HF, NO TIA, NO CLINICAL ARRHYTHMIA,CONTINUE CURRENT MEDS, EDUCATION, DIET, EXERCISE, RTC AS SCHEDULED, WILL CALL WITH RESULTS  Arm edema  Comments:  CHECK V DOPPLER  Orders:  -     Cardiology Lab US Upper Extremity Veins Right; Future    Essential hypertension  Comments:  COMPLIANCE WITH MEDS    RTC Low level/low impact aerobic exercise 5x's/wk. Heart healthy diet and risk factor modification.    See labs and med orders.    Aerobic exercise 5x's/wk. Heart healthy diet and risk factor modification.    See labs and med orders.

## 2017-05-09 ENCOUNTER — TELEPHONE (OUTPATIENT)
Dept: CARDIOLOGY | Facility: CLINIC | Age: 77
End: 2017-05-09

## 2017-05-09 DIAGNOSIS — R60.0 ARM EDEMA: Primary | ICD-10-CM

## 2017-05-11 ENCOUNTER — INFUSION (OUTPATIENT)
Dept: INFUSION THERAPY | Facility: HOSPITAL | Age: 77
End: 2017-05-11
Attending: INTERNAL MEDICINE
Payer: MEDICARE

## 2017-05-11 DIAGNOSIS — C91.10 LEUKEMIA, LYMPHOCYTIC, CHRONIC: Primary | ICD-10-CM

## 2017-05-11 PROCEDURE — 25000003 PHARM REV CODE 250: Mod: PN | Performed by: INTERNAL MEDICINE

## 2017-05-11 PROCEDURE — 96523 IRRIG DRUG DELIVERY DEVICE: CPT | Mod: PN

## 2017-05-11 RX ORDER — HEPARIN 100 UNIT/ML
500 SYRINGE INTRAVENOUS
Status: CANCELLED | OUTPATIENT
Start: 2017-05-11

## 2017-05-11 RX ORDER — SODIUM CHLORIDE 0.9 % (FLUSH) 0.9 %
10 SYRINGE (ML) INJECTION
Status: DISCONTINUED | OUTPATIENT
Start: 2017-05-11 | End: 2017-05-11 | Stop reason: HOSPADM

## 2017-05-11 RX ORDER — HEPARIN 100 UNIT/ML
500 SYRINGE INTRAVENOUS
Status: COMPLETED | OUTPATIENT
Start: 2017-05-11 | End: 2017-05-11

## 2017-05-11 RX ORDER — SODIUM CHLORIDE 0.9 % (FLUSH) 0.9 %
10 SYRINGE (ML) INJECTION
Status: CANCELLED
Start: 2017-05-11

## 2017-05-11 RX ADMIN — HEPARIN 500 UNITS: 100 SYRINGE at 02:05

## 2017-05-11 RX ADMIN — SODIUM CHLORIDE, PRESERVATIVE FREE 10 ML: 5 INJECTION INTRAVENOUS at 02:05

## 2017-05-17 PROBLEM — M79.89 SWELLING OF LIMB: Status: ACTIVE | Noted: 2017-05-17

## 2017-06-22 ENCOUNTER — INFUSION (OUTPATIENT)
Dept: INFUSION THERAPY | Facility: HOSPITAL | Age: 77
End: 2017-06-22
Attending: INTERNAL MEDICINE
Payer: MEDICARE

## 2017-06-22 DIAGNOSIS — C91.10 LEUKEMIA, LYMPHOCYTIC, CHRONIC: Primary | ICD-10-CM

## 2017-06-22 PROCEDURE — 25000003 PHARM REV CODE 250: Mod: PN | Performed by: INTERNAL MEDICINE

## 2017-06-22 PROCEDURE — 96523 IRRIG DRUG DELIVERY DEVICE: CPT | Mod: PN

## 2017-06-22 RX ORDER — SODIUM CHLORIDE 0.9 % (FLUSH) 0.9 %
10 SYRINGE (ML) INJECTION
Status: DISCONTINUED | OUTPATIENT
Start: 2017-06-22 | End: 2017-06-22 | Stop reason: HOSPADM

## 2017-06-22 RX ORDER — SODIUM CHLORIDE 0.9 % (FLUSH) 0.9 %
10 SYRINGE (ML) INJECTION
Status: CANCELLED
Start: 2017-06-22

## 2017-06-22 RX ORDER — HEPARIN 100 UNIT/ML
500 SYRINGE INTRAVENOUS
Status: CANCELLED | OUTPATIENT
Start: 2017-06-22

## 2017-06-22 RX ORDER — HEPARIN 100 UNIT/ML
500 SYRINGE INTRAVENOUS
Status: COMPLETED | OUTPATIENT
Start: 2017-06-22 | End: 2017-06-22

## 2017-06-22 RX ADMIN — HEPARIN 500 UNITS: 100 SYRINGE at 01:06

## 2017-06-22 RX ADMIN — SODIUM CHLORIDE, PRESERVATIVE FREE 10 ML: 5 INJECTION INTRAVENOUS at 01:06

## 2017-07-17 ENCOUNTER — LAB VISIT (OUTPATIENT)
Dept: LAB | Facility: HOSPITAL | Age: 77
End: 2017-07-17
Attending: INTERNAL MEDICINE
Payer: MEDICARE

## 2017-07-17 DIAGNOSIS — R50.9 FEVER, UNSPECIFIED FEVER CAUSE: ICD-10-CM

## 2017-07-17 DIAGNOSIS — C91.10 LEUKEMIA, LYMPHOCYTIC, CHRONIC: ICD-10-CM

## 2017-07-17 LAB
ALBUMIN SERPL BCP-MCNC: 4.8 G/DL
ALP SERPL-CCNC: 73 U/L
ALT SERPL W/O P-5'-P-CCNC: 30 U/L
ANION GAP SERPL CALC-SCNC: 12 MMOL/L
ANISOCYTOSIS BLD QL SMEAR: SLIGHT
AST SERPL-CCNC: 24 U/L
BACTERIA #/AREA URNS HPF: ABNORMAL /HPF
BASOPHILS NFR BLD: 0 %
BILIRUB SERPL-MCNC: 1.9 MG/DL
BILIRUB UR QL STRIP: NEGATIVE
BUN SERPL-MCNC: 23 MG/DL
CALCIUM SERPL-MCNC: 9.6 MG/DL
CHLORIDE SERPL-SCNC: 96 MMOL/L
CLARITY UR: CLEAR
CO2 SERPL-SCNC: 25 MMOL/L
COLOR UR: YELLOW
CREAT SERPL-MCNC: 1 MG/DL
DIFFERENTIAL METHOD: ABNORMAL
EOSINOPHIL NFR BLD: 0 %
ERYTHROCYTE [DISTWIDTH] IN BLOOD BY AUTOMATED COUNT: 15.2 %
EST. GFR  (AFRICAN AMERICAN): >60 ML/MIN/1.73 M^2
EST. GFR  (NON AFRICAN AMERICAN): >60 ML/MIN/1.73 M^2
GLUCOSE SERPL-MCNC: 106 MG/DL
GLUCOSE UR QL STRIP: NEGATIVE
HCT VFR BLD AUTO: 41.3 %
HGB BLD-MCNC: 13.1 G/DL
HGB UR QL STRIP: ABNORMAL
HYALINE CASTS #/AREA URNS LPF: 0 /LPF (ref 0–1)
KETONES UR QL STRIP: ABNORMAL
LEUKOCYTE ESTERASE UR QL STRIP: NEGATIVE
LYMPHOCYTES NFR BLD: 90 %
MAGNESIUM SERPL-MCNC: 2 MG/DL
MCH RBC QN AUTO: 27 PG
MCHC RBC AUTO-ENTMCNC: 31.7 %
MCV RBC AUTO: 85 FL
MICROSCOPIC COMMENT: ABNORMAL
MONOCYTES NFR BLD: 0 %
NEUTROPHILS # BLD AUTO: 9.3 K/UL
NEUTROPHILS NFR BLD: 10 %
NITRITE UR QL STRIP: NEGATIVE
NRBC BLD-RTO: 0 /100 WBC
PH UR STRIP: 6 [PH] (ref 5–8)
PLATELET # BLD AUTO: 120 K/UL
PLATELET BLD QL SMEAR: ABNORMAL
PMV BLD AUTO: 12.1 FL
POIKILOCYTOSIS BLD QL SMEAR: SLIGHT
POTASSIUM SERPL-SCNC: 4.1 MMOL/L
PROT SERPL-MCNC: 7.1 G/DL
PROT UR QL STRIP: ABNORMAL
RBC # BLD AUTO: 4.85 M/UL
RBC #/AREA URNS HPF: 8 /HPF (ref 0–4)
SMUDGE CELLS BLD QL SMEAR: PRESENT
SODIUM SERPL-SCNC: 133 MMOL/L
SP GR UR STRIP: 1.02 (ref 1–1.03)
URN SPEC COLLECT METH UR: ABNORMAL
UROBILINOGEN UR STRIP-ACNC: 1 EU/DL
WBC # BLD AUTO: 93.49 K/UL
WBC #/AREA URNS HPF: 0 /HPF (ref 0–5)

## 2017-07-17 PROCEDURE — 83735 ASSAY OF MAGNESIUM: CPT | Mod: PN

## 2017-07-17 PROCEDURE — 36415 COLL VENOUS BLD VENIPUNCTURE: CPT | Mod: PN

## 2017-07-17 PROCEDURE — 85027 COMPLETE CBC AUTOMATED: CPT

## 2017-07-17 PROCEDURE — 85007 BL SMEAR W/DIFF WBC COUNT: CPT | Mod: PN

## 2017-07-17 PROCEDURE — 85027 COMPLETE CBC AUTOMATED: CPT | Mod: PN

## 2017-07-17 PROCEDURE — 87086 URINE CULTURE/COLONY COUNT: CPT

## 2017-07-17 PROCEDURE — 83735 ASSAY OF MAGNESIUM: CPT

## 2017-07-17 PROCEDURE — 87086 URINE CULTURE/COLONY COUNT: CPT | Mod: PN

## 2017-07-17 PROCEDURE — 80053 COMPREHEN METABOLIC PANEL: CPT

## 2017-07-17 PROCEDURE — 81001 URINALYSIS AUTO W/SCOPE: CPT | Mod: PN

## 2017-07-17 PROCEDURE — 87040 BLOOD CULTURE FOR BACTERIA: CPT | Mod: PN

## 2017-07-17 PROCEDURE — 80053 COMPREHEN METABOLIC PANEL: CPT | Mod: PN

## 2017-07-17 PROCEDURE — 81001 URINALYSIS AUTO W/SCOPE: CPT

## 2017-07-17 PROCEDURE — 87040 BLOOD CULTURE FOR BACTERIA: CPT

## 2017-07-17 PROCEDURE — 85007 BL SMEAR W/DIFF WBC COUNT: CPT

## 2017-07-19 LAB — BACTERIA UR CULT: NO GROWTH

## 2017-07-20 PROBLEM — J18.9 PNEUMONIA OF RIGHT LOWER LOBE DUE TO INFECTIOUS ORGANISM: Status: ACTIVE | Noted: 2017-07-20

## 2017-07-20 PROBLEM — A41.9 SEPSIS: Status: ACTIVE | Noted: 2017-07-20

## 2017-07-20 PROBLEM — R79.89 ELEVATED TROPONIN: Status: ACTIVE | Noted: 2017-07-20

## 2017-07-21 PROBLEM — G93.41 ACUTE METABOLIC ENCEPHALOPATHY: Status: ACTIVE | Noted: 2017-07-21

## 2017-07-21 PROBLEM — R50.9 FEVER IN ADULT: Status: ACTIVE | Noted: 2017-07-21

## 2017-07-21 PROBLEM — G93.40 ENCEPHALOPATHY: Status: ACTIVE | Noted: 2017-07-21

## 2017-07-22 PROBLEM — R59.0 LYMPHADENOPATHY, MESENTERIC: Status: ACTIVE | Noted: 2017-07-22

## 2017-07-22 LAB — BACTERIA BLD CULT: NORMAL

## 2017-07-23 PROBLEM — B45.1: Status: ACTIVE | Noted: 2017-07-23

## 2017-07-23 PROBLEM — B45.1 CRYPTOCOCCAL MENINGITIS: Status: ACTIVE | Noted: 2017-07-23

## 2017-07-24 PROBLEM — E11.9 TYPE 2 DIABETES MELLITUS WITHOUT COMPLICATION, WITHOUT LONG-TERM CURRENT USE OF INSULIN: Status: ACTIVE | Noted: 2017-07-24

## 2017-07-28 PROBLEM — A41.9 SEPSIS: Status: RESOLVED | Noted: 2017-07-20 | Resolved: 2017-07-28

## 2017-07-28 PROBLEM — R79.89 ELEVATED TROPONIN: Status: RESOLVED | Noted: 2017-07-20 | Resolved: 2017-07-28

## 2017-07-29 PROBLEM — G93.41 ACUTE METABOLIC ENCEPHALOPATHY: Status: RESOLVED | Noted: 2017-07-21 | Resolved: 2017-07-29

## 2017-07-29 PROBLEM — R50.9 FEVER IN ADULT: Status: RESOLVED | Noted: 2017-07-21 | Resolved: 2017-07-29

## 2017-07-30 PROBLEM — E87.6 HYPOKALEMIA: Status: ACTIVE | Noted: 2017-07-30

## 2017-08-01 PROBLEM — N40.1 URINARY RETENTION DUE TO BENIGN PROSTATIC HYPERPLASIA: Status: ACTIVE | Noted: 2017-08-01

## 2017-08-01 PROBLEM — R33.8 URINARY RETENTION DUE TO BENIGN PROSTATIC HYPERPLASIA: Status: ACTIVE | Noted: 2017-08-01

## 2017-08-08 PROBLEM — Z86.61 HISTORY OF ENCEPHALITIS: Status: ACTIVE | Noted: 2017-08-08

## 2017-08-17 ENCOUNTER — LAB VISIT (OUTPATIENT)
Dept: LAB | Facility: HOSPITAL | Age: 77
End: 2017-08-17
Attending: INTERNAL MEDICINE
Payer: MEDICARE

## 2017-08-17 ENCOUNTER — OFFICE VISIT (OUTPATIENT)
Dept: INFECTIOUS DISEASES | Facility: CLINIC | Age: 77
End: 2017-08-17
Payer: MEDICARE

## 2017-08-17 VITALS — HEIGHT: 69 IN | WEIGHT: 162.25 LBS | BODY MASS INDEX: 24.03 KG/M2 | TEMPERATURE: 98 F

## 2017-08-17 DIAGNOSIS — Z79.02 LONG TERM (CURRENT) USE OF ANTITHROMBOTICS/ANTIPLATELETS: ICD-10-CM

## 2017-08-17 DIAGNOSIS — C91.10 LEUKEMIA, LYMPHOCYTIC, CHRONIC: ICD-10-CM

## 2017-08-17 DIAGNOSIS — B45.1 CRYPTOCOCCAL MENINGOENCEPHALITIS: ICD-10-CM

## 2017-08-17 DIAGNOSIS — B45.1 CRYPTOCOCCAL MENINGOENCEPHALITIS: Primary | ICD-10-CM

## 2017-08-17 DIAGNOSIS — E11.9 TYPE 2 DIABETES MELLITUS WITHOUT COMPLICATION, WITHOUT LONG-TERM CURRENT USE OF INSULIN: ICD-10-CM

## 2017-08-17 PROCEDURE — 99204 OFFICE O/P NEW MOD 45 MIN: CPT | Mod: S$PBB,,, | Performed by: INTERNAL MEDICINE

## 2017-08-17 PROCEDURE — 99213 OFFICE O/P EST LOW 20 MIN: CPT | Mod: PBBFAC | Performed by: INTERNAL MEDICINE

## 2017-08-17 PROCEDURE — 1126F AMNT PAIN NOTED NONE PRSNT: CPT | Mod: ,,, | Performed by: INTERNAL MEDICINE

## 2017-08-17 PROCEDURE — 36415 COLL VENOUS BLD VENIPUNCTURE: CPT

## 2017-08-17 PROCEDURE — 1159F MED LIST DOCD IN RCRD: CPT | Mod: ,,, | Performed by: INTERNAL MEDICINE

## 2017-08-17 PROCEDURE — 99999 PR PBB SHADOW E&M-EST. PATIENT-LVL III: CPT | Mod: PBBFAC,,, | Performed by: INTERNAL MEDICINE

## 2017-08-17 PROCEDURE — 86403 PARTICLE AGGLUT ANTBDY SCRN: CPT

## 2017-08-17 NOTE — PROGRESS NOTES
Subjective:      Patient ID: Jan Kelly is a 76 y.o. male.    Chief Complaint:No chief complaint on file.      History of Present Illness  76-year-old man with CLL who was admitted to [facility] on July 19 for fever and altered mental status after a fall.  He was on Imbruciva for his CLL.  He had a temperature of 103, and he was started on empiric therapy with acyclovir, fluconazole, and IV antibiotics on July 21. Dr. Reyes saw the patient in consultation there.  LP was performed on 7/21.  CSF white blood cell count was 692, with 67% neutrophils, 25% lymphocytes, 8% monocytes.  West Nile virus antibodies were negative.  HSV PCR was negative.  Blood, urine, and CSF cultures were negative, but cryptococcal antigen was positive in the CSF 1:70. CD4 count was 188.     He was given 2 weeks of IV amphotericin B, and he was discharged on August 4 with oral fluconazole, 400 mg daily, for at least one month. I am seeing him in follow up. I spent over 30 minutes in chart review for this encounter.    Review of Systems   Constitution: Positive for malaise/fatigue. Negative for fever.   HENT: Positive for headaches.    All other systems reviewed and are negative.    Objective:   Physical Exam   Constitutional: He is oriented to person, place, and time. He appears well-developed and well-nourished.   HENT:   Head: Normocephalic and atraumatic.   Eyes: Conjunctivae and EOM are normal. Pupils are equal, round, and reactive to light.   Cardiovascular: Normal rate, regular rhythm and normal heart sounds.    Pulmonary/Chest: Effort normal and breath sounds normal.   Abdominal: Soft. Bowel sounds are normal.   Musculoskeletal: Normal range of motion. He exhibits no edema.   Neurological: He is alert and oriented to person, place, and time.   Skin: Skin is warm and dry.   Psychiatric: He has a normal mood and affect. His behavior is normal. Judgment and thought content normal.     Assessment:       1. Cryptococcal  meningoencephalitis    2. Long term (current) use of antithrombotics/antiplatelets    3. Leukemia, lymphocytic, chronic    4. Type 2 diabetes mellitus without complication, without long-term current use of insulin          Plan:       I spent over 50% of a 45 minute encounter explaining the treatment and prognosis of cryptococcal meningitis to the patient. He will probably need long term suppression, as he has a low CD4 count and has two issues with immunodeficiency. I will check his cryptococcal antigen today and then, if negative, will check CSF titers in one month of fluconazole. If serum antigen is positive, he will remain on fluconazole.

## 2017-08-17 NOTE — LETTER
August 18, 2017      Nomi Reyes MD  [address] [facility] [address]           [facility]  [address]  [facility] [address]  Phone: [phone]  Fax: [phone]          Patient: Jan Kelly   MR Number: [id number]   YOB: 1940   Date of Visit: 8/17/2017       Dear Dr. Nomi Reyes:    Thank you for referring Jan Kelly to me for evaluation. Attached you will find relevant portions of my assessment and plan of care.    If you have questions, please do not hesitate to call me. I look forward to following Jan Kelly along with you.    Sincerely,    Yannick Norwood MD    Enclosure  CC:  No Recipients    If you would like to receive this communication electronically, please contact [email or web address] or [phone] to request more information on [facility] access.    For providers and/or their staff who would like to refer a patient to [facility], please contact us through our one-stop-shop provider referral line, [facility], at [phone].    If you feel you have received this communication in error or would no longer like to receive these types of communications, please e-mail [email or web address]

## 2017-08-18 LAB — CRYPTOC AG SER QL LA: NORMAL

## 2017-08-22 RX ORDER — FLUCONAZOLE 200 MG/1
400 TABLET ORAL DAILY
Qty: 30 TABLET | Refills: 0 | Status: SHIPPED | OUTPATIENT
Start: 2017-08-22 | End: 2017-09-03 | Stop reason: SDUPTHER

## 2017-08-30 RX ORDER — ATENOLOL 25 MG/1
25 TABLET ORAL DAILY
Qty: 90 TABLET | Refills: 0 | Status: SHIPPED | OUTPATIENT
Start: 2017-08-30 | End: 2017-10-23 | Stop reason: SDUPTHER

## 2017-08-30 NOTE — TELEPHONE ENCOUNTER
Prescription sent to [facility] pharmacy in [place], where Atenolol is available, pt verbalizes understanding

## 2017-08-30 NOTE — TELEPHONE ENCOUNTER
----- Message from Alice Garcia sent at 8/30/2017 10:04 AM CDT -----  Contact: Nahomi with [facility]  Nahomi with [facility] [phone] called regarding Atenolol for above patient. They are requesting one of the following: on manufacture back log would doctor like to substitute another medicaton. Thanks!

## 2017-09-03 ENCOUNTER — TELEPHONE (OUTPATIENT)
Dept: INFECTIOUS DISEASES | Facility: HOSPITAL | Age: 77
End: 2017-09-03

## 2017-09-03 RX ORDER — FLUCONAZOLE 200 MG/1
400 TABLET ORAL DAILY
Qty: 30 TABLET | Refills: 0 | Status: SHIPPED | OUTPATIENT
Start: 2017-09-03 | End: 2017-09-17 | Stop reason: SDUPTHER

## 2017-09-11 ENCOUNTER — INFUSION (OUTPATIENT)
Dept: INFUSION THERAPY | Facility: HOSPITAL | Age: 77
End: 2017-09-11
Attending: INTERNAL MEDICINE
Payer: MEDICARE

## 2017-09-11 DIAGNOSIS — C91.10 LEUKEMIA, LYMPHOCYTIC, CHRONIC: Primary | ICD-10-CM

## 2017-09-11 PROCEDURE — 25000003 PHARM REV CODE 250: Mod: PN | Performed by: INTERNAL MEDICINE

## 2017-09-11 PROCEDURE — 63600175 PHARM REV CODE 636 W HCPCS: Mod: PN | Performed by: INTERNAL MEDICINE

## 2017-09-11 PROCEDURE — A4216 STERILE WATER/SALINE, 10 ML: HCPCS | Mod: PN | Performed by: INTERNAL MEDICINE

## 2017-09-11 PROCEDURE — 96523 IRRIG DRUG DELIVERY DEVICE: CPT | Mod: PN

## 2017-09-11 RX ORDER — HEPARIN 100 UNIT/ML
500 SYRINGE INTRAVENOUS
Status: CANCELLED | OUTPATIENT
Start: 2017-09-11

## 2017-09-11 RX ORDER — HEPARIN 100 UNIT/ML
500 SYRINGE INTRAVENOUS
Status: COMPLETED | OUTPATIENT
Start: 2017-09-11 | End: 2017-09-11

## 2017-09-11 RX ORDER — SODIUM CHLORIDE 0.9 % (FLUSH) 0.9 %
10 SYRINGE (ML) INJECTION
Status: CANCELLED
Start: 2017-09-11

## 2017-09-11 RX ORDER — SODIUM CHLORIDE 0.9 % (FLUSH) 0.9 %
10 SYRINGE (ML) INJECTION
Status: DISCONTINUED | OUTPATIENT
Start: 2017-09-11 | End: 2017-09-11 | Stop reason: HOSPADM

## 2017-09-11 RX ADMIN — Medication 10 ML: at 01:09

## 2017-09-11 RX ADMIN — HEPARIN SODIUM (PORCINE) LOCK FLUSH IV SOLN 100 UNIT/ML 500 UNITS: 100 SOLUTION at 01:09

## 2017-09-18 RX ORDER — FLUCONAZOLE 200 MG/1
TABLET ORAL
Qty: 30 TABLET | Refills: 0 | Status: SHIPPED | OUTPATIENT
Start: 2017-09-18 | End: 2017-09-21 | Stop reason: SDUPTHER

## 2017-09-21 ENCOUNTER — OFFICE VISIT (OUTPATIENT)
Dept: INFECTIOUS DISEASES | Facility: CLINIC | Age: 77
End: 2017-09-21
Payer: MEDICARE

## 2017-09-21 VITALS
HEIGHT: 69 IN | HEART RATE: 70 BPM | WEIGHT: 160.5 LBS | DIASTOLIC BLOOD PRESSURE: 75 MMHG | BODY MASS INDEX: 23.77 KG/M2 | SYSTOLIC BLOOD PRESSURE: 148 MMHG | TEMPERATURE: 99 F

## 2017-09-21 DIAGNOSIS — B45.1 CRYPTOCOCCAL MENINGOENCEPHALITIS: ICD-10-CM

## 2017-09-21 DIAGNOSIS — C91.12 CLL (CHRONIC LYMPHOID LEUKEMIA) IN RELAPSE: Primary | ICD-10-CM

## 2017-09-21 DIAGNOSIS — Z79.02 LONG TERM (CURRENT) USE OF ANTITHROMBOTICS/ANTIPLATELETS: ICD-10-CM

## 2017-09-21 DIAGNOSIS — C91.10 LEUKEMIA, LYMPHOCYTIC, CHRONIC: ICD-10-CM

## 2017-09-21 DIAGNOSIS — B45.1 CRYPTOCOCCAL MENINGITIS: ICD-10-CM

## 2017-09-21 PROCEDURE — 99999 PR PBB SHADOW E&M-EST. PATIENT-LVL IV: CPT | Mod: PBBFAC,,, | Performed by: INTERNAL MEDICINE

## 2017-09-21 PROCEDURE — 99214 OFFICE O/P EST MOD 30 MIN: CPT | Mod: S$PBB,,, | Performed by: INTERNAL MEDICINE

## 2017-09-21 PROCEDURE — 99214 OFFICE O/P EST MOD 30 MIN: CPT | Mod: PBBFAC | Performed by: INTERNAL MEDICINE

## 2017-09-21 PROCEDURE — 3078F DIAST BP <80 MM HG: CPT | Mod: ,,, | Performed by: INTERNAL MEDICINE

## 2017-09-21 PROCEDURE — 1125F AMNT PAIN NOTED PAIN PRSNT: CPT | Mod: ,,, | Performed by: INTERNAL MEDICINE

## 2017-09-21 PROCEDURE — 1159F MED LIST DOCD IN RCRD: CPT | Mod: ,,, | Performed by: INTERNAL MEDICINE

## 2017-09-21 PROCEDURE — 3077F SYST BP >= 140 MM HG: CPT | Mod: ,,, | Performed by: INTERNAL MEDICINE

## 2017-09-21 RX ORDER — FLUCONAZOLE 200 MG/1
400 TABLET ORAL DAILY
Qty: 60 TABLET | Refills: 5 | Status: SHIPPED | OUTPATIENT
Start: 2017-09-21 | End: 2017-10-21

## 2017-09-21 NOTE — PROGRESS NOTES
Subjective:      Patient ID: Jan Kelly is a 76 y.o. male.    Chief Complaint:No chief complaint on file.      History of Present Illness  No complaints. No fevers, confusion, or headache. Last Cryptococcal antigen was positive at 1:16.   Says that his fluconazole ran out a few days ago.    Review of Systems   Constitution: Positive for chills. Negative for decreased appetite, fever, weakness, malaise/fatigue, night sweats, weight gain and weight loss.   HENT: Positive for congestion and hearing loss. Negative for ear pain, hoarse voice, sore throat and tinnitus.    Eyes: Negative for blurred vision, redness and visual disturbance.   Cardiovascular: Negative for chest pain, leg swelling and palpitations.   Respiratory: Negative for cough, hemoptysis, shortness of breath and sputum production.    Hematologic/Lymphatic: Negative for adenopathy. Bruises/bleeds easily.   Skin: Negative for dry skin, itching, rash and suspicious lesions.   Musculoskeletal: Negative for back pain, joint pain, myalgias and neck pain.   Gastrointestinal: Negative for abdominal pain, constipation, diarrhea, heartburn, nausea and vomiting.   Genitourinary: Negative for dysuria, flank pain, frequency, hematuria, hesitancy and urgency.   Neurological: Negative for dizziness, headaches, numbness and paresthesias.   Psychiatric/Behavioral: Positive for memory loss. Negative for depression. The patient does not have insomnia and is not nervous/anxious.      Objective:   Physical Exam   Constitutional: He is oriented to person, place, and time. He appears well-developed and well-nourished.   HENT:   Head: Normocephalic and atraumatic.   Eyes: Conjunctivae and EOM are normal. Pupils are equal, round, and reactive to light.   Cardiovascular: Normal rate, regular rhythm and normal heart sounds.    Pulmonary/Chest: Effort normal and breath sounds normal.   Abdominal: Soft. Bowel sounds are normal.   Musculoskeletal: Normal range of motion. He  exhibits no edema.   Neurological: He is alert and oriented to person, place, and time.   Skin: Skin is warm and dry.   Psychiatric: He has a normal mood and affect. His behavior is normal. Judgment and thought content normal.     Assessment:       1. CLL (chronic lymphoid leukemia) in relapse    2. Leukemia, lymphocytic, chronic    3. Cryptococcal meningitis    4. Cryptococcal meningoencephalitis          Plan:       Continue fluconazole  Check Cryptococcal antigen  If altered mental status occurs, or if antigen titer rises - will perform LP

## 2017-09-22 RX ORDER — RAMIPRIL 1.25 MG/1
CAPSULE ORAL
Qty: 90 CAPSULE | Refills: 0 | Status: SHIPPED | OUTPATIENT
Start: 2017-09-22 | End: 2017-12-04 | Stop reason: DRUGHIGH

## 2017-09-25 ENCOUNTER — LAB VISIT (OUTPATIENT)
Dept: LAB | Facility: HOSPITAL | Age: 77
End: 2017-09-25
Attending: INTERNAL MEDICINE
Payer: MEDICARE

## 2017-09-25 DIAGNOSIS — E03.9 HYPOTHYROIDISM, UNSPECIFIED TYPE: ICD-10-CM

## 2017-09-25 DIAGNOSIS — C91.10 CLL (CHRONIC LYMPHOCYTIC LEUKEMIA): ICD-10-CM

## 2017-09-25 LAB
ALBUMIN SERPL BCP-MCNC: 3.5 G/DL
ALP SERPL-CCNC: 77 U/L
ALT SERPL W/O P-5'-P-CCNC: 25 U/L
ANION GAP SERPL CALC-SCNC: 8 MMOL/L
ANISOCYTOSIS BLD QL SMEAR: ABNORMAL
AST SERPL-CCNC: 15 U/L
BASOPHILS NFR BLD: 0 %
BILIRUB SERPL-MCNC: 0.8 MG/DL
BUN SERPL-MCNC: 15 MG/DL
CALCIUM SERPL-MCNC: 9.1 MG/DL
CHLORIDE SERPL-SCNC: 103 MMOL/L
CO2 SERPL-SCNC: 27 MMOL/L
CREAT SERPL-MCNC: 0.96 MG/DL
DIFFERENTIAL METHOD: ABNORMAL
EOSINOPHIL NFR BLD: 0 %
ERYTHROCYTE [DISTWIDTH] IN BLOOD BY AUTOMATED COUNT: 27.9 %
EST. GFR  (AFRICAN AMERICAN): >60 ML/MIN/1.73 M^2
EST. GFR  (NON AFRICAN AMERICAN): >60 ML/MIN/1.73 M^2
FERRITIN SERPL-MCNC: 430 NG/ML
GLUCOSE SERPL-MCNC: 90 MG/DL
HCT VFR BLD AUTO: 26.2 %
HGB BLD-MCNC: 8.5 G/DL
HYPOCHROMIA BLD QL SMEAR: ABNORMAL
IRON SATN MFR SERPL: 27 %
IRON SERPL-MCNC: 54 UG/DL
IRON SERPL-MCNC: 54 UG/DL
LYMPHOCYTES NFR BLD: 95 %
MCH RBC QN AUTO: 31.4 PG
MCHC RBC AUTO-ENTMCNC: 32.4 G/DL
MCV RBC AUTO: 97 FL
MONOCYTES NFR BLD: 1 %
NEUTROPHILS # BLD AUTO: 1.1 K/UL
NEUTROPHILS NFR BLD: 4 %
NRBC BLD-RTO: 0 /100 WBC
OVALOCYTES BLD QL SMEAR: ABNORMAL
PLATELET # BLD AUTO: 72 K/UL
PLATELET BLD QL SMEAR: ABNORMAL
PMV BLD AUTO: 11.5 FL
POIKILOCYTOSIS BLD QL SMEAR: SLIGHT
POLYCHROMASIA BLD QL SMEAR: ABNORMAL
POTASSIUM SERPL-SCNC: 3.8 MMOL/L
PROT SERPL-MCNC: 5.3 G/DL
RBC # BLD AUTO: 2.71 M/UL
SODIUM SERPL-SCNC: 138 MMOL/L
TOTAL IRON BINDING CAPACITY: 203 UG/DL
TSH SERPL DL<=0.005 MIU/L-ACNC: 2.47 UIU/ML
WBC # BLD AUTO: 27.03 K/UL

## 2017-09-25 PROCEDURE — 83540 ASSAY OF IRON: CPT

## 2017-09-25 PROCEDURE — 84443 ASSAY THYROID STIM HORMONE: CPT | Mod: PN

## 2017-09-25 PROCEDURE — 85007 BL SMEAR W/DIFF WBC COUNT: CPT

## 2017-09-25 PROCEDURE — 85007 BL SMEAR W/DIFF WBC COUNT: CPT | Mod: PN

## 2017-09-25 PROCEDURE — 84443 ASSAY THYROID STIM HORMONE: CPT

## 2017-09-25 PROCEDURE — 36415 COLL VENOUS BLD VENIPUNCTURE: CPT | Mod: PN

## 2017-09-25 PROCEDURE — 85027 COMPLETE CBC AUTOMATED: CPT | Mod: PN

## 2017-09-25 PROCEDURE — 80053 COMPREHEN METABOLIC PANEL: CPT

## 2017-09-25 PROCEDURE — 82728 ASSAY OF FERRITIN: CPT | Mod: PN

## 2017-09-25 PROCEDURE — 83540 ASSAY OF IRON: CPT | Mod: PN

## 2017-09-25 PROCEDURE — 80053 COMPREHEN METABOLIC PANEL: CPT | Mod: PN

## 2017-09-25 PROCEDURE — 85027 COMPLETE CBC AUTOMATED: CPT

## 2017-09-25 PROCEDURE — 82728 ASSAY OF FERRITIN: CPT

## 2017-10-02 ENCOUNTER — LAB VISIT (OUTPATIENT)
Dept: LAB | Facility: HOSPITAL | Age: 77
End: 2017-10-02
Attending: INTERNAL MEDICINE
Payer: MEDICARE

## 2017-10-02 DIAGNOSIS — C91.12 CLL (CHRONIC LYMPHOID LEUKEMIA) IN RELAPSE: ICD-10-CM

## 2017-10-02 LAB
ALBUMIN SERPL BCP-MCNC: 3.4 G/DL
ALP SERPL-CCNC: 62 U/L
ALT SERPL W/O P-5'-P-CCNC: 20 U/L
ANION GAP SERPL CALC-SCNC: 9 MMOL/L
ANISOCYTOSIS BLD QL SMEAR: ABNORMAL
AST SERPL-CCNC: 15 U/L
BASOPHILS NFR BLD: 0 %
BILIRUB SERPL-MCNC: 0.6 MG/DL
BUN SERPL-MCNC: 12 MG/DL
CALCIUM SERPL-MCNC: 8.8 MG/DL
CHLORIDE SERPL-SCNC: 103 MMOL/L
CO2 SERPL-SCNC: 28 MMOL/L
CREAT SERPL-MCNC: 0.9 MG/DL
DIFFERENTIAL METHOD: ABNORMAL
EOSINOPHIL NFR BLD: 2 %
ERYTHROCYTE [DISTWIDTH] IN BLOOD BY AUTOMATED COUNT: 28 %
EST. GFR  (AFRICAN AMERICAN): >60 ML/MIN/1.73 M^2
EST. GFR  (NON AFRICAN AMERICAN): >60 ML/MIN/1.73 M^2
GLUCOSE SERPL-MCNC: 124 MG/DL
HCT VFR BLD AUTO: 31.6 %
HGB BLD-MCNC: 10.3 G/DL
LYMPHOCYTES NFR BLD: 93 %
MCH RBC QN AUTO: 31.1 PG
MCHC RBC AUTO-ENTMCNC: 32.6 G/DL
MCV RBC AUTO: 96 FL
MONOCYTES NFR BLD: 2 %
NEUTROPHILS # BLD AUTO: 1 K/UL
NEUTROPHILS NFR BLD: 3 %
NRBC BLD-RTO: 0 /100 WBC
OVALOCYTES BLD QL SMEAR: ABNORMAL
PLATELET # BLD AUTO: 154 K/UL
PLATELET BLD QL SMEAR: ABNORMAL
PMV BLD AUTO: 11.2 FL
POIKILOCYTOSIS BLD QL SMEAR: SLIGHT
POLYCHROMASIA BLD QL SMEAR: ABNORMAL
POTASSIUM SERPL-SCNC: 3.9 MMOL/L
PROT SERPL-MCNC: 5.4 G/DL
RBC # BLD AUTO: 3.31 M/UL
SMUDGE CELLS BLD QL SMEAR: PRESENT
SODIUM SERPL-SCNC: 140 MMOL/L
WBC # BLD AUTO: 32.07 K/UL

## 2017-10-02 PROCEDURE — 36415 COLL VENOUS BLD VENIPUNCTURE: CPT | Mod: PN

## 2017-10-02 PROCEDURE — 85007 BL SMEAR W/DIFF WBC COUNT: CPT | Mod: PN

## 2017-10-02 PROCEDURE — 80053 COMPREHEN METABOLIC PANEL: CPT

## 2017-10-02 PROCEDURE — 85007 BL SMEAR W/DIFF WBC COUNT: CPT

## 2017-10-02 PROCEDURE — 85027 COMPLETE CBC AUTOMATED: CPT | Mod: PN

## 2017-10-02 PROCEDURE — 80053 COMPREHEN METABOLIC PANEL: CPT | Mod: PN

## 2017-10-02 PROCEDURE — 85027 COMPLETE CBC AUTOMATED: CPT

## 2017-10-23 RX ORDER — ATENOLOL 25 MG/1
25 TABLET ORAL DAILY
Qty: 90 TABLET | Refills: 0 | Status: SHIPPED | OUTPATIENT
Start: 2017-10-23 | End: 2017-10-23 | Stop reason: SDUPTHER

## 2017-10-23 RX ORDER — ATENOLOL 25 MG/1
25 TABLET ORAL DAILY
Qty: 90 TABLET | Refills: 0 | Status: SHIPPED | OUTPATIENT
Start: 2017-10-23 | End: 2017-10-26 | Stop reason: SDUPTHER

## 2017-10-23 NOTE — TELEPHONE ENCOUNTER
----- Message from Mily Montoya sent at 10/23/2017  4:18 PM CDT -----  Contact: Rajeev Montoya  [phone]  He is requesting a refill of atenolol be sent to:   [facility] [id number] [place] [address] [id number] [place] [address] AT [facility] [address] [place] [address]  [id number] [place] [address]  Phone: [phone] Fax: [phone]    Thank you!

## 2017-10-26 ENCOUNTER — OFFICE VISIT (OUTPATIENT)
Dept: INFECTIOUS DISEASES | Facility: CLINIC | Age: 77
End: 2017-10-26
Payer: MEDICARE

## 2017-10-26 VITALS
WEIGHT: 158.75 LBS | SYSTOLIC BLOOD PRESSURE: 205 MMHG | BODY MASS INDEX: 23.51 KG/M2 | HEART RATE: 83 BPM | DIASTOLIC BLOOD PRESSURE: 97 MMHG | TEMPERATURE: 98 F | HEIGHT: 69 IN

## 2017-10-26 DIAGNOSIS — C91.12 CLL (CHRONIC LYMPHOID LEUKEMIA) IN RELAPSE: Primary | ICD-10-CM

## 2017-10-26 DIAGNOSIS — C91.10 LEUKEMIA, LYMPHOCYTIC, CHRONIC: ICD-10-CM

## 2017-10-26 DIAGNOSIS — I10 ESSENTIAL HYPERTENSION: ICD-10-CM

## 2017-10-26 DIAGNOSIS — B45.1 CRYPTOCOCCAL MENINGOENCEPHALITIS: ICD-10-CM

## 2017-10-26 PROCEDURE — 99214 OFFICE O/P EST MOD 30 MIN: CPT | Mod: PBBFAC | Performed by: INTERNAL MEDICINE

## 2017-10-26 PROCEDURE — 99999 PR PBB SHADOW E&M-EST. PATIENT-LVL IV: CPT | Mod: PBBFAC,,, | Performed by: INTERNAL MEDICINE

## 2017-10-26 PROCEDURE — 99214 OFFICE O/P EST MOD 30 MIN: CPT | Mod: S$PBB,,, | Performed by: INTERNAL MEDICINE

## 2017-10-26 RX ORDER — FLUCONAZOLE 200 MG/1
400 TABLET ORAL DAILY
Qty: 60 TABLET | Refills: 3 | Status: SHIPPED | OUTPATIENT
Start: 2017-10-26 | End: 2017-11-25

## 2017-10-26 RX ORDER — ATENOLOL 25 MG/1
25 TABLET ORAL DAILY
Qty: 90 TABLET | Refills: 5 | Status: SHIPPED | OUTPATIENT
Start: 2017-10-26 | End: 2017-10-31 | Stop reason: ALTCHOICE

## 2017-10-26 NOTE — PROGRESS NOTES
Subjective:      Patient ID: Jan Kelly is a 77 y.o. male.    Chief Complaint:No chief complaint on file.      History of Present Illness  Denies headache, chest pain, shortness of breath.  He has no vision changes, ringing in the ears, or altered levels of consciousness.  He states that he ran out of his atenolol one week ago.  He also states that he has been constipated, requiring use of MiraLAX.  He continues to take palliative chemotherapy for CLL.  He apparently has run out of fluconazole again.    Review of Systems   Constitution: Negative for chills, decreased appetite, fever, weakness, malaise/fatigue, night sweats, weight gain and weight loss.   HENT: Negative for congestion, ear pain, hearing loss, hoarse voice, sore throat and tinnitus.    Eyes: Negative for blurred vision, redness and visual disturbance.   Cardiovascular: Negative for chest pain, leg swelling and palpitations.   Respiratory: Negative for cough, hemoptysis, shortness of breath and sputum production.    Hematologic/Lymphatic: Negative for adenopathy. Does not bruise/bleed easily.   Skin: Negative for dry skin, itching, rash and suspicious lesions.   Musculoskeletal: Negative for back pain, joint pain, myalgias and neck pain.   Gastrointestinal: Positive for constipation. Negative for abdominal pain, diarrhea, heartburn, nausea and vomiting.   Genitourinary: Positive for urgency. Negative for dysuria, flank pain, frequency, hematuria and hesitancy.   Neurological: Negative for dizziness, headaches, numbness and paresthesias.   Psychiatric/Behavioral: Negative for depression and memory loss. The patient does not have insomnia and is not nervous/anxious.      Objective:   Physical Exam   Constitutional: He is oriented to person, place, and time. He appears well-developed and well-nourished.   HENT:   Head: Normocephalic and atraumatic.   Mouth/Throat: Uvula is midline, oropharynx is clear and moist and mucous membranes are normal.    Eyes: Conjunctivae and EOM are normal. Pupils are equal, round, and reactive to light.   Fundoscopic exam:       The right eye shows no AV nicking, no hemorrhage and no papilledema. The right eye shows red reflex.        The left eye shows no AV nicking, no hemorrhage and no papilledema. The left eye shows red reflex.   Neck: Normal range of motion. Neck supple.   Cardiovascular: Normal rate and regular rhythm.  Exam reveals gallop and S4.    Pulmonary/Chest: Effort normal and breath sounds normal. He has no wheezes. He has no rales.   Abdominal: Soft. Bowel sounds are normal.   Musculoskeletal: Normal range of motion. He exhibits no edema.   Neurological: He is alert and oriented to person, place, and time.   Skin: Skin is warm and dry.   Psychiatric: He has a normal mood and affect. His behavior is normal. Judgment and thought content normal.     Assessment:       1. CLL (chronic lymphoid leukemia) in relapse    2. Leukemia, lymphocytic, chronic    3. Cryptococcal meningoencephalitis    4. Essential hypertension          Plan:       Continue fluconazole.  Recheck Cryptococcal antigen  No sign of hypertensive urgency  Restart atenolol  Follow up with cardiologist for further BP changes  No sign of meningitis or increased ICP - if cryptococcal antigen is higher, will refer for CT and LP.  Continue treatment for CLL. Check CMP and CBC today.

## 2017-10-31 ENCOUNTER — INFUSION (OUTPATIENT)
Dept: INFUSION THERAPY | Facility: HOSPITAL | Age: 77
End: 2017-10-31
Attending: INTERNAL MEDICINE
Payer: MEDICARE

## 2017-10-31 ENCOUNTER — TELEPHONE (OUTPATIENT)
Dept: CARDIOLOGY | Facility: CLINIC | Age: 77
End: 2017-10-31

## 2017-10-31 DIAGNOSIS — C91.10 LEUKEMIA, LYMPHOCYTIC, CHRONIC: Primary | ICD-10-CM

## 2017-10-31 PROCEDURE — A4216 STERILE WATER/SALINE, 10 ML: HCPCS | Mod: PN | Performed by: INTERNAL MEDICINE

## 2017-10-31 PROCEDURE — 96523 IRRIG DRUG DELIVERY DEVICE: CPT | Mod: PN

## 2017-10-31 PROCEDURE — 63600175 PHARM REV CODE 636 W HCPCS: Mod: PN | Performed by: INTERNAL MEDICINE

## 2017-10-31 PROCEDURE — 25000003 PHARM REV CODE 250: Mod: PN | Performed by: INTERNAL MEDICINE

## 2017-10-31 RX ORDER — SODIUM CHLORIDE 0.9 % (FLUSH) 0.9 %
10 SYRINGE (ML) INJECTION
Status: CANCELLED
Start: 2017-10-31

## 2017-10-31 RX ORDER — HEPARIN 100 UNIT/ML
500 SYRINGE INTRAVENOUS
Status: COMPLETED | OUTPATIENT
Start: 2017-10-31 | End: 2017-10-31

## 2017-10-31 RX ORDER — METOPROLOL SUCCINATE 25 MG/1
25 TABLET, EXTENDED RELEASE ORAL DAILY
Qty: 30 TABLET | Refills: 1 | Status: SHIPPED | OUTPATIENT
Start: 2017-10-31 | End: 2017-12-04 | Stop reason: DRUGHIGH

## 2017-10-31 RX ORDER — SODIUM CHLORIDE 0.9 % (FLUSH) 0.9 %
10 SYRINGE (ML) INJECTION
Status: DISCONTINUED | OUTPATIENT
Start: 2017-10-31 | End: 2017-10-31 | Stop reason: HOSPADM

## 2017-10-31 RX ORDER — HEPARIN 100 UNIT/ML
500 SYRINGE INTRAVENOUS
Status: CANCELLED | OUTPATIENT
Start: 2017-10-31

## 2017-10-31 RX ADMIN — HEPARIN 500 UNITS: 100 SYRINGE at 02:10

## 2017-10-31 RX ADMIN — Medication 10 ML: at 02:10

## 2017-10-31 NOTE — TELEPHONE ENCOUNTER
Sent message for Dr. Clemente to call patient about the medicine change.    I called the son to let him know I sent the message to Dr. Clemente .

## 2017-10-31 NOTE — TELEPHONE ENCOUNTER
----- Message from Jenna Peacock sent at 10/31/2017  3:57 PM CDT -----  Contact: Luis Montoya patient's son called requesting a call back regarding patient blood pressure requesting alternative for Altenol. Patient is out of medication experience nose bleeds Send to [facility] pharmacy. Please call back at [phone]. Thanks,

## 2017-11-01 ENCOUNTER — TELEPHONE (OUTPATIENT)
Dept: CARDIOLOGY | Facility: CLINIC | Age: 77
End: 2017-11-01

## 2017-11-01 NOTE — TELEPHONE ENCOUNTER
----- Message from Yajaira Ureña sent at 11/1/2017 12:26 PM CDT -----  Contact: Son Jan Dumont  Patient is having trouble with his blood pressure and having nose bleeds.  Requesting appointment access, please call his son [phone].  Thank you!

## 2017-11-07 RX ORDER — PRAVASTATIN SODIUM 10 MG/1
TABLET ORAL
Qty: 90 TABLET | Refills: 0 | Status: SHIPPED | OUTPATIENT
Start: 2017-11-07 | End: 2018-02-02 | Stop reason: SDUPTHER

## 2017-11-08 NOTE — TELEPHONE ENCOUNTER
Appt scheduled   [facility]ist Progress Note    Date of Service: 2017    Chief Complaint  66 y.o. female admitted 2017 with shortness of breath and evidence of fluid overload with her lasix being decreased outpatient.    Interval Problem Update   she is requiring 2-3 liters oxygen  Today she is concerned about her home lasix dosing    Consultants/Specialty  none    Disposition  home        Review of Systems   Constitutional: Negative for diaphoresis and fever.   HENT: Negative for congestion.    Respiratory: Positive for shortness of breath. Negative for cough, sputum production and wheezing.         Patient is breathing easier    Cardiovascular: Negative for chest pain.   Gastrointestinal: Negative for abdominal pain, diarrhea, nausea and vomiting.   Genitourinary: Negative for dysuria and urgency.   Neurological: Negative for dizziness, tremors and headaches.   Psychiatric/Behavioral: The patient is nervous/anxious.       Physical Exam  Laboratory/Imaging   Hemodynamics  Temp (24hrs), Av.3 °C (97.4 °F), Min:35.8 °C (96.5 °F), Max:36.7 °C (98 °F)   Temperature: 36.6 °C (97.8 °F)  Pulse  Av.7  Min: 74  Max: 101    Blood Pressure : 100/69, NIBP: 102/60      Respiratory      Respiration: (!) 22, Pulse Oximetry: 93 %     Work Of Breathing / Effort: Tachypnea  RUL Breath Sounds: Clear, RML Breath Sounds: Diminished, RLL Breath Sounds: Diminished, SHUBHAM Breath Sounds: Clear, LLL Breath Sounds: Clear    Fluids    Intake/Output Summary (Last 24 hours) at 17 0836  Last data filed at 17 2330   Gross per 24 hour   Intake              800 ml   Output             1100 ml   Net             -300 ml       Nutrition  Orders Placed This Encounter   Procedures   • Diet Order     Standing Status:   Standing     Number of Occurrences:   1     Order Specific Question:   Diet:     Answer:   Diabetic [3]     Order Specific Question:   Diet:     Answer:   Cardiac [6]     Order Specific Question:   Consistency/Fluid  modifications:     Answer:   1800 ml Fluid Restriction [10]     Physical Exam   Eyes: No scleral icterus.   Neck: No tracheal deviation present.   Cardiovascular: Normal rate, regular rhythm and intact distal pulses.    No murmur heard.  Pulmonary/Chest: No stridor. No respiratory distress. She has no wheezes. She has no rales.   Abdominal: Soft. She exhibits no distension. There is no tenderness.   Musculoskeletal: She exhibits edema.   Trace edema   Neurological: She is alert. Coordination normal.   Skin: Skin is warm. No rash noted. She is not diaphoretic. No erythema.   Psychiatric: Her behavior is normal.   Nursing note and vitals reviewed.      Recent Labs      11/05/17   1508  11/06/17   0135  11/07/17   0233   WBC  8.7  7.2  6.0   RBC  5.06  4.80  4.76   HEMOGLOBIN  15.8  14.5  14.3   HEMATOCRIT  45.9  42.9  42.9   MCV  90.7  89.4  90.1   MCH  31.2  30.2  30.0   MCHC  34.4  33.8  33.3*   RDW  48.2  46.5  46.6   PLATELETCT  237  175  188   MPV  10.9  10.8  10.9     Recent Labs      11/05/17   1508  11/06/17   0135  11/07/17   0233   SODIUM  135  138  139   POTASSIUM  4.6  4.1  4.3   CHLORIDE  101  103  103   CO2  27  30  28   GLUCOSE  101*  121*  102*   BUN  16  16  20   CREATININE  0.90  0.73  0.74   CALCIUM  10.0  9.8  9.4     Recent Labs      11/05/17   1508   APTT  30.3   INR  1.14*     Recent Labs      11/05/17   1508  11/07/17   0233   BNPBTYPENAT  1604*  1115*              Assessment/Plan     Acute on chronic combined systolic and diastolic CHF (congestive heart failure) (CMS-[facility])   Assessment & Plan    Blood pressure is stable in the 80's to 90's, tolerating lasix well  She is stillneeding 2 liters oxygen  Last echocardiogram June 2017, will repeat echocardiogram and if changes will consult cardiology        Essential hypertension- (present on admission)   Assessment & Plan    Controlled, holding coreg for low blood pressure as needed  Blood pressure improved with diuresis        Presence of  biventricular AICD- (present on admission)   Assessment & Plan    No arrythmias on monitoring        Restrictive lung disease- (present on admission)   Assessment & Plan    No acute issues        Lung cancer (CMS-HCC)- (present on admission)   Assessment & Plan    Outpatient follow up, historical diagnosis        LBBB (left bundle branch block)- (present on admission)   Assessment & Plan    Pacemaker in place        Type 2 diabetes mellitus without complication (CMS-HCC)- (present on admission)   Assessment & Plan    blood sugars in good range and not needing coverage, outpatient she is on metformin, will stop insulin  Continue diabetic diet        Type 2 diabetes mellitus with diabetic polyneuropathy (CMS-HCC)- (present on admission)   Assessment & Plan    Gabapentin for neuropathy pain        Obstructive sleep apnea- (present on admission)   Assessment & Plan    cpap at night  Patient uses oxygen during the day as needed at home            Reviewed items::  Labs reviewed and Medications reviewed  Reece catheter::  No Reece  DVT prophylaxis pharmacological::  Heparin  Ulcer Prophylaxis::  Not indicated

## 2017-12-04 ENCOUNTER — OFFICE VISIT (OUTPATIENT)
Dept: CARDIOLOGY | Facility: CLINIC | Age: 77
End: 2017-12-04
Payer: MEDICARE

## 2017-12-04 VITALS
DIASTOLIC BLOOD PRESSURE: 78 MMHG | BODY MASS INDEX: 23.35 KG/M2 | HEIGHT: 69 IN | SYSTOLIC BLOOD PRESSURE: 172 MMHG | HEART RATE: 77 BPM | WEIGHT: 157.63 LBS

## 2017-12-04 DIAGNOSIS — I10 ESSENTIAL HYPERTENSION: Primary | ICD-10-CM

## 2017-12-04 DIAGNOSIS — I49.49 PREMATURE BEATS: ICD-10-CM

## 2017-12-04 DIAGNOSIS — I34.0 NON-RHEUMATIC MITRAL REGURGITATION: ICD-10-CM

## 2017-12-04 DIAGNOSIS — I25.10 CORONARY ARTERY DISEASE INVOLVING NATIVE CORONARY ARTERY OF NATIVE HEART WITHOUT ANGINA PECTORIS: ICD-10-CM

## 2017-12-04 DIAGNOSIS — E78.00 HYPERCHOLESTEREMIA: ICD-10-CM

## 2017-12-04 PROCEDURE — 99999 PR PBB SHADOW E&M-EST. PATIENT-LVL III: CPT | Mod: PBBFAC,,, | Performed by: INTERNAL MEDICINE

## 2017-12-04 PROCEDURE — 99213 OFFICE O/P EST LOW 20 MIN: CPT | Mod: PBBFAC,PO | Performed by: INTERNAL MEDICINE

## 2017-12-04 PROCEDURE — 99214 OFFICE O/P EST MOD 30 MIN: CPT | Mod: S$PBB,,, | Performed by: INTERNAL MEDICINE

## 2017-12-04 RX ORDER — RAMIPRIL 2.5 MG/1
2.5 CAPSULE ORAL DAILY
Qty: 30 CAPSULE | Refills: 4 | Status: SHIPPED | OUTPATIENT
Start: 2017-12-04 | End: 2018-03-06

## 2017-12-04 RX ORDER — METOPROLOL SUCCINATE 50 MG/1
50 TABLET, EXTENDED RELEASE ORAL DAILY
Qty: 30 TABLET | Refills: 4 | Status: SHIPPED | OUTPATIENT
Start: 2017-12-04 | End: 2018-03-10 | Stop reason: SDUPTHER

## 2017-12-04 NOTE — PROGRESS NOTES
Subjective:    Patient ID:  Jan Kelly is a 77 y.o. male who presents for Coronary Artery Disease; Hypertension (Higher blood pressure since med change ); and Valvular Heart Disease        HPI  S/P PROLONGED HOSPITALIZATION WITH INFECTIOUS ENCEPHALOPATHY,BP UP OFF ATENOLOL, RECORDS REVIEWED, RECENT LABS NOTEDSEE ROS    Past Medical History:   Diagnosis Date    Cancer     CLL    Cardiomyopathy     Chronic lymphocytic leukemia     Coronary artery disease     wih stents    Diabetes mellitus     Heart block     Heart murmur     Hyperlipidemia     Hypertension     Valvular regurgitation      Past Surgical History:   Procedure Laterality Date    CARDIAC CATHETERIZATION      CHOLECYSTECTOMY      COLONOSCOPY      COLONOSCOPY N/A 1/6/2017    Procedure: COLONOSCOPY;  Surgeon: Reym Pardo MD;  Location[facility];  Service: Endoscopy;  Laterality: N/A;    CORONARY ANGIOPLASTY      CORONARY STENT PLACEMENT      PORTACATH PLACEMENT      TONSILLECTOMY       Family History   Problem Relation Age of Onset    Heart disease Father     Hypertension Father      Social History     Social History    Marital status:      Spouse name: N/A    Number of children: N/A    Years of education: N/A     Social History Main Topics    Smoking status: Former Smoker     Quit date: 1970    Smokeless tobacco: Never Used    Alcohol use No      Comment: rarely    Drug use: No    Sexual activity: Not on file     Other Topics Concern    Not on file     Social History Narrative    No narrative on file       Review of patient's allergies indicates:   Allergen Reactions    Penicillins     Titanium dioxide        Current Outpatient Prescriptions:     aspirin (ECOTRIN) 81 MG EC tablet, Take 81 mg by mouth every morning. , Disp: , Rfl:     cyanocobalamin (VITAMIN B-12) 1000 MCG tablet, Take 1,000 mcg by mouth once daily. , Disp: , Rfl:     HUMALOG KWIKPEN 100 unit/mL InPn pen, INJECT 1 UNIT UNDER THE SKIN  PRF HIGH BLOOD SUGAR UTD, Disp: , Rfl: 3    hydrocodone-acetaminophen 10-325mg (NORCO)  mg Tab, Take 1 tablet by mouth nightly., Disp: 100 tablet, Rfl: 0    IMBRUVICA 140 mg Cap, Take 3 capsules by mouth once daily at 6am., Disp: 90 capsule, Rfl: 6    L.acidophil,parac-S.therm-Bif. (RISAQUAD) Cap capsule, Take 1 capsule by mouth once daily., Disp: , Rfl:     pantoprazole (PROTONIX) 40 MG tablet, TK 1 T PO D 30 MIN B JIL, Disp: , Rfl: 3    pravastatin (PRAVACHOL) 10 MG tablet, TAKE 1 TABLET(10 MG) BY MOUTH EVERY EVENING, Disp: 90 tablet, Rfl: 0    tamsulosin (FLOMAX) 0.4 mg Cp24, Take 1 capsule (0.4 mg total) by mouth once daily., Disp: 30 capsule, Rfl: 4    hydroxyurea (HYDREA) 500 mg Cap, Take 2 capsules (1,000 mg total) by mouth 2 (two) times daily., Disp: 60 capsule, Rfl: 3    metoprolol succinate (TOPROL-XL) 50 MG 24 hr tablet, Take 1 tablet (50 mg total) by mouth once daily., Disp: 30 tablet, Rfl: 4    ondansetron (ZOFRAN-ODT) 4 MG TbDL, Take 2 tablets (8 mg total) by mouth every 6 to 8 hours as needed., Disp: 30 tablet, Rfl: 2    potassium chloride (MICRO-K) 10 MEQ CpSR, Take 2 capsules (20 mEq total) by mouth once daily., Disp: 14 capsule, Rfl: 1    ramipril (ALTACE) 2.5 MG capsule, Take 1 capsule (2.5 mg total) by mouth once daily., Disp: 30 capsule, Rfl: 4    Current Facility-Administered Medications:     sodium chloride 0.9% flush 10 mL, 10 mL, Intravenous, PRN, Regino Washington MD, 10 mL at 09/29/17 0824    Review of Systems   Constitution: Negative for chills, decreased appetite, diaphoresis, fever, weakness, malaise/fatigue and night sweats. Weight loss: BETTER NOW.   HENT: Negative for congestion and nosebleeds.    Eyes: Negative for blurred vision, double vision and visual disturbance.   Cardiovascular: Negative for chest pain, claudication, cyanosis, irregular heartbeat (OCC), near-syncope, orthopnea, palpitations, paroxysmal nocturnal dyspnea and syncope. Dyspnea on exertion: MILD OCC.  "Leg swelling: OCC.   Respiratory: Negative for cough, hemoptysis, sleep disturbances due to breathing, snoring, sputum production and wheezing.    Endocrine: Negative for cold intolerance and heat intolerance.   Hematologic/Lymphatic: Negative for bleeding problem. Adenopathy: IMPROVED WITH TX. Does not bruise/bleed easily.   Skin: Negative for color change, itching, nail changes and rash.   Musculoskeletal: Negative for back pain and falls. Arthritis: mild.   Gastrointestinal: Negative for abdominal pain, constipation, dysphagia, heartburn, hematemesis, jaundice and melena.   Genitourinary: Negative for frequency, hematuria and incomplete emptying.   Neurological: Negative for brief paralysis, difficulty with concentration, dizziness (occ), focal weakness, light-headedness, loss of balance, numbness, paresthesias and tremors.   Psychiatric/Behavioral: Negative for altered mental status, memory loss and substance abuse. The patient is not nervous/anxious.    Allergic/Immunologic: Negative for persistent infections.        Objective:      Vitals:    12/04/17 1512   BP: (!) 172/78   Pulse: 77   Weight: 71.5 kg (157 lb 10.1 oz)   Height: 5' 9" (1.753 m)   PainSc: 0-No pain     Body mass index is 23.28 kg/m².    Physical Exam   Constitutional: He is oriented to person, place, and time. He appears well-developed and well-nourished.   HENT:   Head: Normocephalic and atraumatic.   Mouth/Throat: Oropharynx is clear and moist.   Eyes: Conjunctivae and EOM are normal. Pupils are equal, round, and reactive to light.   Neck: Neck supple. Normal carotid pulses, no hepatojugular reflux and no JVD present. Carotid bruit is not present. No tracheal deviation present. No thyromegaly present.   Cardiovascular: Normal rate and regular rhythm.  Exam reveals no gallop, no distant heart sounds, no friction rub and no midsystolic click.      High-pitched blowing early systolic murmur is present  at the apex  Pulses:       Carotid pulses " are 2+ on the right side, and 2+ on the left side.       Radial pulses are 2+ on the right side, and 2+ on the left side.        Femoral pulses are 2+ on the right side, and 2+ on the left side.       Dorsalis pedis pulses are 2+ on the right side, and 2+ on the left side.        Posterior tibial pulses are 2+ on the right side, and 2+ on the left side.   Pulmonary/Chest: Effort normal and breath sounds normal. No tachypnea and no bradypnea.   Abdominal: Soft. Bowel sounds are normal. He exhibits no distension and no mass. There is no tenderness.   Musculoskeletal: Normal range of motion. He exhibits no edema.   Lymphadenopathy:     He has no cervical adenopathy.     He has no axillary adenopathy.   Neurological: He is alert and oriented to person, place, and time. No cranial nerve deficit. Coordination normal.   Skin: Skin is warm and dry. No erythema.   Psychiatric: He has a normal mood and affect. His speech is normal. Judgment and thought content normal. He is agitated.               ..    Chemistry        Component Value Date/Time     11/27/2017 1240    K 4.5 11/27/2017 1240     11/27/2017 1240    CO2 28 11/27/2017 1240    BUN 16 11/27/2017 1240    CREATININE 0.95 11/27/2017 1240    GLU 86 11/27/2017 1240        Component Value Date/Time    CALCIUM 9.7 11/27/2017 1240    ALKPHOS 64 11/27/2017 1240    AST 25 11/27/2017 1240    AST 33 02/18/2016 1158    ALT 23 11/27/2017 1240    BILITOT 0.7 11/27/2017 1240    ESTGFRAFRICA >60 11/27/2017 1240    EGFRNONAA >60 11/27/2017 1240            ..  Lab Results   Component Value Date    CHOL 114 (L) 03/17/2017    CHOL 135 08/19/2016    CHOL 164 05/16/2016     Lab Results   Component Value Date    HDL 40 03/17/2017    HDL 32 (L) 08/19/2016    HDL 39 (L) 05/16/2016     Lab Results   Component Value Date    LDLCALC 65.8 03/17/2017    LDLCALC 85.4 08/19/2016    LDLCALC 108.0 05/16/2016     Lab Results   Component Value Date    TRIG 41 03/17/2017    TRIG 88  08/19/2016    TRIG 85 05/16/2016     Lab Results   Component Value Date    CHOLHDL 35.1 03/17/2017    CHOLHDL 23.7 08/19/2016    CHOLHDL 23.8 05/16/2016     ..  Lab Results   Component Value Date    WBC 26.69 (H) 11/27/2017    HGB 12.1 (L) 11/27/2017    HCT 38.3 (L) 11/27/2017    MCV 99 (H) 11/27/2017    PLT 93 (L) 11/27/2017       Test(s) Reviewed  I have reviewed the following in detail:  [] Stress test   [] Angiography   [] Echocardiogram   [x] Labs   [x] Other:       Assessment:         ICD-10-CM ICD-9-CM   1. Essential hypertension I10 401.9   2. Coronary artery disease involving native coronary artery of native heart without angina pectoris I25.10 414.01   3. Non-rheumatic mitral regurgitation I34.0 424.0   4. Premature beats I49.49 427.60   5. Hypercholesteremia E78.00 272.0     Problem List Items Addressed This Visit        Cardiac/Vascular    Essential hypertension - Primary    Coronary artery disease involving native coronary artery of native heart without angina pectoris    Non-rheumatic mitral regurgitation    Hypercholesteremia    Premature beats           Plan:     INCREASE METOPROLOL TO 50 MG AND RAMIPRIL TO 2.5 MG. ALL CV CLINICALLY STABLE, NO ANGINA, NO HF, NO TIA, NO SIGNIFICANT CLINICAL ARRHYTHMIA,CONTINUE OTHER  MEDS, EDUCATION, DIET, EXERCISE, RTC IN 4-5 MO WITH BP LOG      Essential hypertension    Coronary artery disease involving native coronary artery of native heart without angina pectoris    Non-rheumatic mitral regurgitation    Premature beats    Hypercholesteremia    Other orders  -     metoprolol succinate (TOPROL-XL) 50 MG 24 hr tablet; Take 1 tablet (50 mg total) by mouth once daily.  Dispense: 30 tablet; Refill: 4  -     ramipril (ALTACE) 2.5 MG capsule; Take 1 capsule (2.5 mg total) by mouth once daily.  Dispense: 30 capsule; Refill: 4    RTC Low level/low impact aerobic exercise 5x's/wk. Heart healthy diet and risk factor modification.    See labs and med orders.    Aerobic  exercise 5x's/wk. Heart healthy diet and risk factor modification.    See labs and med orders.

## 2017-12-04 NOTE — PATIENT INSTRUCTIONS
Understanding Coronary Artery Disease (CAD)    To understand coronary artery disease (CAD), you need to know how your heart works. Your heart is a muscle that pumps blood throughout your body. To work right, your heart needs a steady supply of oxygen. It gets this oxygen from blood supplied by the coronary arteries.     Healthy artery   Healthy artery. When a coronary artery is healthy and has no blockages, blood flows through easily. Healthy arteries can easily supply the oxygen-rich blood your heart needs.     Damaged artery   Damaged artery. Coronary artery disease begins when damage to the artery lining leads to the buildup of fat-like substances and cholesterol along the artery wall. This is called plaque. This damage could be caused by things like high blood pressure or smoking. This plaque buildup begins to narrow the arteries carrying blood to the heart. This is called atherosclerosis,     Narrowed artery   Narrowed artery. As more plaque builds up, your artery has trouble supplying blood to your heart muscle when it needs it most, such as during exercise. You may not feel any symptoms when this happens. Or you may feel angina--pressure, tightness, achiness, or pain in your chest, jaw, neck, back, or arm.     Blocked artery   Blocked artery. A piece of plaque may break off and completely block the artery. Or a blood clot may plug the narrowed artery. When this happens, blood flow is blocked from reaching the heart. Without oxygen-rich blood, part of the heart muscle becomes damaged and stops working. You may feel crushing pressure or pain in or around your chest. This is a heart attack (acute myocardial infarction, or AMI) and is a medical emergency.     Date Last Reviewed: 3/28/2016  © 1678-4225 [employer]. [address] [place] [address], [place] [address]. All rights reserved. This information is not intended as a substitute for professional medical care. Always follow your healthcare  professional's instructions.        Exercise for a Healthier Heart  You may wonder how you can improve the health of your heart. If youre thinking about exercise, youre on the right track. You dont need to become an athlete, but you do need a certain amount of brisk exercise to help strengthen your heart. If you have been diagnosed with a heart condition, your doctor may recommend exercise to help stabilize your condition. To help make exercise a habit, choose safe, fun activities.     Exercise with a friend. When activity is fun, you're more likely to stick with it.     Be sure to check with your healthcare provider before starting an exercise program.   Why exercise?  Exercising regularly offers many healthy rewards. It can help you do all of the following:  · Improve your blood cholesterol level to help prevent further heart trouble  · Lower your blood pressure to help prevent a stroke or heart attack  · Control diabetes, or reduce your risk of getting this disease  · Improve your heart and lung function  · Reach and maintain a healthy weight  · Make your muscles stronger and more limber so you can stay active  · Prevent falls and fractures by slowing the loss of bone mass (osteoporosis)  · Manage stress better  · Reduce your blood pressure  · Improve your sense of self and your body image  Exercise tips  Ease into your routine. Set small goals. Then build on them.  Exercise on most days. Aim for a total of 150 or more minutes of moderate to  vigorous intensity activity each week. Consider 40 minutes, 3 to 4 times a week. For best results, activity should last for 40 minutes on average. It is OK to work up to the 40 minute period over time. Examples of moderate-intensity activity is walking 1 mile in 15 minutes or 30 to 45 minutes of yard work.  Step up your daily activity level. Along with your exercise program, try being more active throughout the day. Walk instead of drive. Do more household tasks or yard  work.  Choose one or more activities you enjoy. Walking is one of the easiest things you can do. You can also try swimming, riding a bike, dancing, or taking an exercise class.  Stop exercising and call your doctor if you:  · Have chest pain or feel dizzy or lightheaded  · Feel burning, tightness, pressure, or heaviness in your chest, neck, shoulders, back, or arms  · Have unusual shortness of breath  · Have increased joint or muscle pain  · Have palpitations or an irregular heartbeat   Date Last Reviewed: 5/1/2016  © 5486-9152 [employer]. [address] [place] [address], [place] [address]. All rights reserved. This information is not intended as a substitute for professional medical care. Always follow your healthcare professional's instructions.        Controlling High Blood Pressure  High blood pressure (hypertension) is often called the silent killer. This is because many people who have it dont know it. High blood pressure is defined as 140/90 mm Hg or higher. Know your blood pressure and remember to check it regularly. Doing so can save your life. Here are some things you can do to help control your blood pressure.    Choose heart-healthy foods  · Select low-salt, low-fat foods. Limit sodium intake to 2,400 mg per day or the amount suggested by your healthcare provider.  · Limit canned, dried, cured, packaged, and fast foods. These can contain a lot of salt.  · Eat 8 to 10 servings of fruits and vegetables every day.  · Choose lean meats, fish, or chicken.  · Eat whole-grain pasta, brown rice, and beans.  · Eat 2 to 3 servings of low-fat or fat-free dairy products.  · Ask your doctor about the DASH eating plan. This plan helps reduce blood pressure.  · When you go to a restaurant, ask that your meal be prepared with no added salt.  Maintain a healthy weight  · Ask your healthcare provider how many calories to eat a day. Then stick to that number.  · Ask your healthcare provider what weight range is  healthiest for you. If you are overweight, a weight loss of only 3% to 5% of your body weight can help lower blood pressure. Generally, a good weight loss goal is to lose 10% of your body weight in a year.  · Limit snacks and sweets.  · Get regular exercise.  Get up and get active  · Choose activities you enjoy. Find ones you can do with friends or family. This includes bicycling, dancing, walking, and jogging.  · Park farther away from building entrances.  · Use stairs instead of the elevator.  · When you can, walk or bike instead of driving.  · [place] leaves, garden, or do household repairs.  · Be active at a moderate to vigorous level of physical activity for at least 40 minutes for a minimum of 3 to 4 days a week.   Manage stress  · Make time to relax and enjoy life. Find time to laugh.  · Communicate your concerns with your loved ones and your healthcare provider.  · Visit with family and friends, and keep up with hobbies.  Limit alcohol and quit smoking  · Men should have no more than 2 drinks per day.  · Women should have no more than 1 drink per day.  · Talk with your healthcare provider about quitting smoking. Smoking significantly increases your risk for heart disease and stroke. Ask your healthcare provider about community smoking cessation programs and other options.  Medicines  If lifestyle changes arent enough, your healthcare provider may prescribe high blood pressure medicine. Take all medicines as prescribed. If you have any questions about your medicines, ask your healthcare provider before stopping or changing them.   Date Last Reviewed: 4/27/2016  © 3832-9636 [employer]. [address] [place] [address], [place] [address]. All rights reserved. This information is not intended as a substitute for professional medical care. Always follow your healthcare professional's instructions.

## 2017-12-07 ENCOUNTER — OFFICE VISIT (OUTPATIENT)
Dept: INFECTIOUS DISEASES | Facility: CLINIC | Age: 77
End: 2017-12-07
Payer: MEDICARE

## 2017-12-07 VITALS
DIASTOLIC BLOOD PRESSURE: 82 MMHG | HEART RATE: 73 BPM | HEIGHT: 69 IN | BODY MASS INDEX: 23.97 KG/M2 | TEMPERATURE: 98 F | WEIGHT: 161.81 LBS | SYSTOLIC BLOOD PRESSURE: 194 MMHG

## 2017-12-07 DIAGNOSIS — C91.12 CLL (CHRONIC LYMPHOID LEUKEMIA) IN RELAPSE: ICD-10-CM

## 2017-12-07 DIAGNOSIS — B45.1 CRYPTOCOCCAL MENINGITIS: Primary | ICD-10-CM

## 2017-12-07 DIAGNOSIS — B45.1 CRYPTOCOCCAL MENINGOENCEPHALITIS: ICD-10-CM

## 2017-12-07 PROCEDURE — 99999 PR PBB SHADOW E&M-EST. PATIENT-LVL III: CPT | Mod: PBBFAC,,, | Performed by: INTERNAL MEDICINE

## 2017-12-07 PROCEDURE — 99213 OFFICE O/P EST LOW 20 MIN: CPT | Mod: S$PBB,,, | Performed by: INTERNAL MEDICINE

## 2017-12-07 PROCEDURE — 99213 OFFICE O/P EST LOW 20 MIN: CPT | Mod: PBBFAC | Performed by: INTERNAL MEDICINE

## 2017-12-07 RX ORDER — FLUCONAZOLE 200 MG/1
400 TABLET ORAL DAILY
Qty: 60 TABLET | Refills: 5 | Status: SHIPPED | OUTPATIENT
Start: 2017-12-07 | End: 2018-01-06

## 2017-12-07 NOTE — PROGRESS NOTES
Subjective:      Patient ID: Jan Kelly is a 77 y.o. male.    Chief Complaint:Follow-up      History of Present Illness  No headaches, no fevers or chills. Appetite is improved, but no weight gain.    Review of Systems   Constitution: Negative for chills, decreased appetite, fever, weakness, malaise/fatigue, night sweats, weight gain and weight loss.   HENT: Positive for congestion. Negative for ear pain, hearing loss, hoarse voice, sore throat and tinnitus.    Eyes: Negative for blurred vision, redness and visual disturbance.   Cardiovascular: Negative for chest pain, leg swelling and palpitations.   Respiratory: Negative for cough, hemoptysis, shortness of breath and sputum production.    Hematologic/Lymphatic: Negative for adenopathy. Does not bruise/bleed easily.   Skin: Negative for dry skin, itching, rash and suspicious lesions.   Musculoskeletal: Negative for back pain, joint pain, myalgias and neck pain.   Gastrointestinal: Positive for constipation. Negative for abdominal pain, diarrhea, heartburn, nausea and vomiting.   Genitourinary: Negative for dysuria, flank pain, frequency, hematuria, hesitancy and urgency.   Neurological: Negative for dizziness, headaches, numbness and paresthesias.   Psychiatric/Behavioral: Negative for depression and memory loss. The patient does not have insomnia and is not nervous/anxious.      Objective:   Physical Exam   Constitutional: He is oriented to person, place, and time. He appears well-developed and well-nourished.   HENT:   Head: Normocephalic and atraumatic.   Mouth/Throat: Uvula is midline, oropharynx is clear and moist and mucous membranes are normal.   Eyes: Conjunctivae and EOM are normal. Pupils are equal, round, and reactive to light.   Fundoscopic exam:       The right eye shows no AV nicking, no hemorrhage and no papilledema. The right eye shows red reflex.        The left eye shows no AV nicking, no hemorrhage and no papilledema. The left eye shows  red reflex.   Neck: Normal range of motion. Neck supple.   Cardiovascular: Normal rate and regular rhythm.  Exam reveals gallop and S4.    Pulmonary/Chest: Effort normal and breath sounds normal. He has no wheezes. He has no rales.   Abdominal: Soft. Bowel sounds are normal.   Musculoskeletal: Normal range of motion. He exhibits no edema.   Neurological: He is alert and oriented to person, place, and time.   Skin: Skin is warm and dry.   Psychiatric: He has a normal mood and affect. His behavior is normal. Judgment and thought content normal.   Nursing note and vitals reviewed.    Assessment:       1. Cryptococcal meningitis    2. Cryptococcal meningoencephalitis    3. CLL (chronic lymphoid leukemia) in relapse          Plan:       Check cryptococcal antigen today.  Continue fluconazole at 400 mg daily  Will not stop fluconazole until cryptococcal antigen is zero.  Repeat LP for any neurological symptoms.

## 2017-12-12 ENCOUNTER — INFUSION (OUTPATIENT)
Dept: INFUSION THERAPY | Facility: HOSPITAL | Age: 77
End: 2017-12-12
Attending: INTERNAL MEDICINE
Payer: MEDICARE

## 2017-12-12 DIAGNOSIS — C91.10 LEUKEMIA, LYMPHOCYTIC, CHRONIC: Primary | ICD-10-CM

## 2017-12-12 PROCEDURE — 25000003 PHARM REV CODE 250: Mod: PN | Performed by: INTERNAL MEDICINE

## 2017-12-12 PROCEDURE — A4216 STERILE WATER/SALINE, 10 ML: HCPCS | Mod: PN | Performed by: INTERNAL MEDICINE

## 2017-12-12 PROCEDURE — 96523 IRRIG DRUG DELIVERY DEVICE: CPT | Mod: PN

## 2017-12-12 PROCEDURE — 63600175 PHARM REV CODE 636 W HCPCS: Mod: PN | Performed by: INTERNAL MEDICINE

## 2017-12-12 RX ORDER — SODIUM CHLORIDE 0.9 % (FLUSH) 0.9 %
10 SYRINGE (ML) INJECTION
Status: DISCONTINUED | OUTPATIENT
Start: 2017-12-12 | End: 2017-12-12 | Stop reason: HOSPADM

## 2017-12-12 RX ORDER — HEPARIN 100 UNIT/ML
500 SYRINGE INTRAVENOUS
Status: COMPLETED | OUTPATIENT
Start: 2017-12-12 | End: 2017-12-12

## 2017-12-12 RX ADMIN — HEPARIN 500 UNITS: 100 SYRINGE at 01:12

## 2017-12-12 RX ADMIN — SODIUM CHLORIDE, PRESERVATIVE FREE 10 ML: 5 INJECTION INTRAVENOUS at 01:12

## 2017-12-12 NOTE — PATIENT INSTRUCTIONS
Central Line Infections     Good handwashing helps prevent central line infections.   You need a central line as part of your treatment. Its also called a central venous access device (CVAD) or central venous catheter (CVC). A small, soft tube called a catheter is put in a vein that leads to your heart. The central line is used instead of a standard IV (intravenous) line. It does not need to be replaced as often as a standard IV. This means less pain and fewer needlesticks during treatment. But central lines come with a risk of infection. This sheet tells you more about central line infections and what hospitals are doing to prevent them. And it explains how an infection is treated, if one occurs.  Types of central lines  With a central line, a catheter is inserted into your body through a vein that leads to the large vein near the heart (vena cava). Types of central lines and their risk of infection are listed below. Which type is best for you depends on your needs and your overall health. Your healthcare provider can tell you which type of line you need, and why.  · Peripherally inserted central catheter (PICC). This is placed in a large vein in the upper arm, or near the bend of the elbow.  · Subclavian line. This is placed in a vein that runs behind the collarbone.  · Internal jugular line. This is placed into a large vein in the neck. Infection risk is higher than with a PICC or subclavian line, but lower than with a femoral line.  · Femoral line. This may be placed in a large vein in the groin. This site is generally not used because of an increased risk for infection.   · Tunneled catheter. This is run through the soft tissue under the skin before it enters a vein. A small cuff helps hold the catheter in place. Both the tunnel and the cuff help prevent infection. This type of catheter may be placed in any of the above locations.  · Port. This small device is placed completely under the skin on the arm or  chest. Its connected to a catheter that is threaded into the vena cava.  Types of infections  A central line provides a direct path into your bloodstream. This gives germs possible access into your body. All types of central lines are associated with some risk of infection. Often, the germs that cause a central line infection come from your own skin. There are 2 possible types of infection:  · Local infection. This can occur where the central line enters your body. Symptoms include redness, pain, or swelling at or near the catheter site, pain or tenderness along the path of the catheter, and drainage from the skin around the catheter.  · Systemic infection(also called bacteremia). This can occur if germs get into the bloodstream. This is very serious and can be fatal. Symptoms include sudden fever, shaking chills, a racing heartbeat, confusion, change in behavior, and a skin rash.  Risk factors for infection  Anyone who has a central line can get an infection. Your risk is higher if you:  · Are in the intensive care unit (ICU).  · Have a weakened immune system or serious illness.  · Are receiving bone marrow or chemotherapy.  · Have the line for an extended time.  · Have a central line in your neck or groin.  How central line infections are treated  Treatment depends on the type of central line, how severe the infection is, and your overall health. Your healthcare provider will prescribe antibiotics to fight the infection. The line may also need to be removed. In some cases, the line is flushed with high doses of antibiotics. This may kill the germs causing the infection, so the line doesnt have to be removed.  What hospitals do to prevent infection  Hospitals have a plan to reduce central line infections. This plan includes:  · Good hand hygiene. Hospital staff clean their hands before and after touching the line. They wash their hands with soap and water. Or they use an alcohol-based hand  containing at  least 60% alcohol.  · Using sterile practices during placement. The healthcare worker who places the line wears germ-free (sterile) clothing including a long-sleeved gown and gloves. Before the line is placed, your skin is cleaned with an antiseptic solution. During placement, you are fully covered with a large sterile sheet (a sterile drape). Only the spot where the line is placed is exposed. After placement, the site where the line enters the body is covered with a sterile bandage (dressing).  · Choosing a lower-risk vein. Whenever possible, the line is placed in the vein that's right for your treatment and has the lowest infection risk. Some hospitals use lines coated with an antiseptic to reduce the chance of infection.  · Checking for infection. The line is checked frequently for infection. It is removed as soon as you no longer need it.  What you can do to prevent infection  Before you get a central line, ask questions. Find out why you need the line and where it will be placed. Learn what steps the hospital is taking to reduce your infection risk. Once the line has been placed, you, your caretakers, and any visitors can help prevent infection by doing the following:  · Use good hand hygiene. Wash your hands often with soap and water, and use alcohol-based hand gel as directed. To clean your hands effectively, follow the guidelines on this sheet. Visitors should wash hands well when they arrive and when they leave.  · Make sure healthcare staff clean their hands. They should use soap and water or an alcohol-based hand  before and after checking the line. Dont be afraid to remind them.  · Keep the line dry. Follow your providers guidelines for showering. If the dressing does get wet, tell your healthcare provider right away.  · Dont touch the line. Even when your hands are clean, try not to touch the catheter or dressing.  · Learn the sterile dressing technique. This is important if you will be caring  for the line at home. Your provider can show you what to do.  Risk for blood clot  If a blood clot forms it can block blood flow through the vein where the catheter is placed. Signs of a blood clot include pain or swelling in the neck, face, chest, or arm. If you have any of these symptoms, call your healthcare provider right away. You may need an ultrasound exam to locate the blood clot and receive treatment with a blood thinner.    How to wash your hands  To protect the central line from germs, its very important to wash your hands often and clean them well. You and anyone who comes in contact with you should follow these steps:  · Wet your hands with warm water. (Avoid hot water. It can cause skin irritation when you wash your hands often.)  · Apply enough soap to cover the entire surface of your hands, including your fingers.  · Rub your hands together briskly for at least 15 seconds. Make sure to rub the front and back of each hand up to the wrist, your fingers and fingernails, between the fingers, and each thumb.  · Rinse your hands with warm water.  · Dry your hands completely with a new, unused paper towel. Dont use a cloth towel or other reusable towel. These can harbor germs.  · Use the paper towel to turn off the faucet, then throw it away. If youre in a bathroom, also use a paper towel to open the door instead of touching the handle.  Using alcohol-based hand gels  When you dont have access to soap and water, alcohol-based hand gels are a good choice for cleaning your hands. The gel should have at least 60% alcohol. Note that some germs can't be killed by alcohol. Your healthcare team can answer any questions you have about when to use hand gel, or when its better to wash with soap and water. Follow these steps:  · Spread about 1 tablespoon of gel in the palm of one hand. (Check the package for specific guidelines.)  · Rub your hands together briskly. Clean the backs of your hands, the palms,  between your fingers, and up your wrists.  · Rub until the gel is gone and your hands are completely dry.   When to seek medical care  Call your healthcare provider right away if you have a central line and develop any of the following:  · Pain or burning in your shoulder, chest, back, arm, or leg  · Fever of 100.4°F (38.0°C) or higher  · Chills  · Signs of infection at the catheter site (pain, redness, drainage, burning, or stinging)  · Coughing, wheezing, or shortness of breath  · A racing or irregular heartbeat  · Muscle stiffness or trouble moving  · Gurgling noises coming from the catheter  · The catheter falls out, breaks, cracks, leaks, or has other damage  Date Last Reviewed: 7/1/2016  © 1064-5396 [employer] [address] [place] [address]. All rights reserved. This information is not intended as a substitute for professional medical care. Always follow your healthcare professional's instructions.        Preventing Falls: Are You At Risk of Falling?     Ask for help to reduce risk of falling in your home.     As you get older, you're not as steady on your feet as you once were. And you may have health problems you didn't have when you were younger. So, it's not surprising that older people are more likely to trip and fall. Falling can be very serious. It can change your overall health and quality of life. That's why it's important to be aware of your own risk of falling.  The dangers of falling  Falls are one of the main causes of injury in people over age 65. An older person who falls may take longer to get better than a younger person. And, after a fall, an older person is more likely to have problems that don't go away. So, preventing falls can help you avoid serious health problems.  Are you at risk of falling?  Answer these questions to rate your level of risk.  · Are you a woman?  · Have you fallen or stumbled in the last year?  · Are you over age 65?  · Are you ever dizzy or  "lightheaded with standing?  · Do you have a hard time getting in and out of the bathtub or on and off the toilet?  · Do you lean on objects to help you get around? Or do you use a cane or walker?  · Do you have vision or hearing problems? For example, do you need new glasses or hearing aids?  · Do you have 2 or more long-lasting (chronic) medical conditions?  · Do you take 3 or more medicines?  · Have you felt depressed recently?  · Have you had more trouble with your memory in recent months?  · Are there hazards in your home that might cause you to fall, such as loose rugs or poor lighting?  · Do you have a pet that jumps on you or might trip you?  · Have you stopped getting regular exercise?  · Do you have diabetes?   · Do you have a neurologic disease, such as Parkinson or Alzheimer disease?   · Do you drink alcohol?  · Do you wear athletic shoes or slippers, or go barefoot at home?  You can help prevent falls  If you answered "yes" to any of the above questions, you should take steps to reduce your risk of a fall. Monitoring health conditions and keeping walkways in your home free of clutter are just 2 ways. Changing is sometimes easier said than done. But keep in mind that even small changes can make you less likely to fall.  The fear of falling  It's normal to be scared of falling, especially if you've fallen before. But being afraid can actually make you more likely to fall. This is because:  · Fear might cause you to become less active. Being less active can lead to a loss of strength and balance.  · Fear can lead to isolation from others, depression, or the use of more medicines or alcohol. And all these things make falling even more likely.  To break the cycle, learn more about ways to avoid falling. As you take control, you may find yourself feeling less afraid.   Date Last Reviewed: 6/12/2015  © 7933-6149 [employer]. [address] [place] [address], [place] [address]. All rights reserved. This " information is not intended as a substitute for professional medical care. Always follow your healthcare professional's instructions.

## 2018-01-12 RX ORDER — TAMSULOSIN HYDROCHLORIDE 0.4 MG/1
CAPSULE ORAL
Qty: 30 CAPSULE | Refills: 0 | Status: SHIPPED | OUTPATIENT
Start: 2018-01-12 | End: 2018-04-09

## 2018-01-23 ENCOUNTER — INFUSION (OUTPATIENT)
Dept: INFUSION THERAPY | Facility: HOSPITAL | Age: 78
End: 2018-01-23
Attending: INTERNAL MEDICINE
Payer: MEDICARE

## 2018-01-23 DIAGNOSIS — C91.10 LEUKEMIA, LYMPHOCYTIC, CHRONIC: Primary | ICD-10-CM

## 2018-01-23 PROCEDURE — 25000003 PHARM REV CODE 250: Mod: PN | Performed by: PHYSICIAN ASSISTANT

## 2018-01-23 PROCEDURE — 63600175 PHARM REV CODE 636 W HCPCS: Mod: PN | Performed by: PHYSICIAN ASSISTANT

## 2018-01-23 PROCEDURE — A4216 STERILE WATER/SALINE, 10 ML: HCPCS | Mod: PN | Performed by: PHYSICIAN ASSISTANT

## 2018-01-23 PROCEDURE — 96523 IRRIG DRUG DELIVERY DEVICE: CPT | Mod: PN

## 2018-01-23 RX ORDER — HEPARIN 100 UNIT/ML
500 SYRINGE INTRAVENOUS
Status: COMPLETED | OUTPATIENT
Start: 2018-01-23 | End: 2018-01-23

## 2018-01-23 RX ORDER — HEPARIN 100 UNIT/ML
500 SYRINGE INTRAVENOUS
Status: CANCELLED | OUTPATIENT
Start: 2018-01-23

## 2018-01-23 RX ORDER — SODIUM CHLORIDE 0.9 % (FLUSH) 0.9 %
10 SYRINGE (ML) INJECTION
Status: CANCELLED
Start: 2018-01-23

## 2018-01-23 RX ORDER — SODIUM CHLORIDE 0.9 % (FLUSH) 0.9 %
10 SYRINGE (ML) INJECTION
Status: DISCONTINUED | OUTPATIENT
Start: 2018-01-23 | End: 2018-01-23 | Stop reason: HOSPADM

## 2018-01-23 RX ADMIN — SODIUM CHLORIDE, PRESERVATIVE FREE 500 UNITS: 5 INJECTION INTRAVENOUS at 11:01

## 2018-01-23 RX ADMIN — SODIUM CHLORIDE, PRESERVATIVE FREE 10 ML: 5 INJECTION INTRAVENOUS at 11:01

## 2018-02-02 RX ORDER — PRAVASTATIN SODIUM 10 MG/1
TABLET ORAL
Qty: 90 TABLET | Refills: 0 | Status: SHIPPED | OUTPATIENT
Start: 2018-02-02 | End: 2018-05-01 | Stop reason: CLARIF

## 2018-02-12 ENCOUNTER — LAB VISIT (OUTPATIENT)
Dept: LAB | Facility: HOSPITAL | Age: 78
End: 2018-02-12
Attending: INTERNAL MEDICINE
Payer: MEDICARE

## 2018-02-12 ENCOUNTER — OFFICE VISIT (OUTPATIENT)
Dept: INFECTIOUS DISEASES | Facility: CLINIC | Age: 78
End: 2018-02-12
Payer: MEDICARE

## 2018-02-12 VITALS
HEART RATE: 84 BPM | DIASTOLIC BLOOD PRESSURE: 99 MMHG | BODY MASS INDEX: 23.22 KG/M2 | WEIGHT: 156.75 LBS | HEIGHT: 69 IN | SYSTOLIC BLOOD PRESSURE: 199 MMHG

## 2018-02-12 DIAGNOSIS — B45.1 CRYPTOCOCCAL MENINGITIS: ICD-10-CM

## 2018-02-12 DIAGNOSIS — B45.1 CRYPTOCOCCAL MENINGITIS: Primary | ICD-10-CM

## 2018-02-12 DIAGNOSIS — I10 ESSENTIAL HYPERTENSION: ICD-10-CM

## 2018-02-12 PROCEDURE — 1126F AMNT PAIN NOTED NONE PRSNT: CPT | Mod: ,,, | Performed by: INTERNAL MEDICINE

## 2018-02-12 PROCEDURE — 36415 COLL VENOUS BLD VENIPUNCTURE: CPT | Mod: PO

## 2018-02-12 PROCEDURE — 1159F MED LIST DOCD IN RCRD: CPT | Mod: ,,, | Performed by: INTERNAL MEDICINE

## 2018-02-12 PROCEDURE — 86403 PARTICLE AGGLUT ANTBDY SCRN: CPT

## 2018-02-12 PROCEDURE — 99999 PR PBB SHADOW E&M-EST. PATIENT-LVL III: CPT | Mod: PBBFAC,,, | Performed by: INTERNAL MEDICINE

## 2018-02-12 PROCEDURE — 99213 OFFICE O/P EST LOW 20 MIN: CPT | Mod: PBBFAC,PO | Performed by: INTERNAL MEDICINE

## 2018-02-12 PROCEDURE — 99214 OFFICE O/P EST MOD 30 MIN: CPT | Mod: S$PBB,,, | Performed by: INTERNAL MEDICINE

## 2018-02-12 NOTE — PROGRESS NOTES
"Subjective:      Patient ID: Jan Kelly is a 77 y.o. male.    Chief Complaint:Follow-up      History of Present Illness  A 77-year-old man with CLL and cryptococcal meningitis/meningoencephalitis who is as a follow up. Mr. Kelly was initially diagnosed In July 2017 with cryptococcal meningitis after presenting to [facility] with fever and altered mental status. He denies headaches but admits to blurry vision, which has improved since starting fluconazole, and weight loss of 7 lbs in 3 weeks. He voices concern at having to continue fluconazole therapy and that we are "prescribing stuff just to prescribe".     Review of Systems   Constitution: Positive for weight loss. Negative for chills, decreased appetite, fever, weakness, malaise/fatigue, night sweats and weight gain.   HENT: Negative for congestion, ear pain, hearing loss, hoarse voice, sore throat and tinnitus.    Eyes: Positive for blurred vision. Negative for redness and visual disturbance.   Cardiovascular: Negative for chest pain, leg swelling and palpitations.   Respiratory: Negative for cough, hemoptysis, shortness of breath and sputum production.    Hematologic/Lymphatic: Negative for adenopathy. Does not bruise/bleed easily.   Skin: Negative for dry skin, itching, rash and suspicious lesions.   Musculoskeletal: Negative for back pain, joint pain, myalgias and neck pain.   Gastrointestinal: Negative for abdominal pain, constipation, diarrhea, heartburn, nausea and vomiting.   Genitourinary: Negative for dysuria, flank pain, frequency, hematuria, hesitancy and urgency.   Neurological: Negative for dizziness, headaches, numbness and paresthesias.   Psychiatric/Behavioral: Negative for depression and memory loss. The patient does not have insomnia and is not nervous/anxious.      Objective:   Physical Exam   Constitutional: He is oriented to person, place, and time. He appears well-developed and well-nourished.   HENT:   Head: Normocephalic and " atraumatic.   Right Ear: External ear normal.   Left Ear: External ear normal.   Mouth/Throat: Oropharynx is clear and moist.   Eyes: Conjunctivae and EOM are normal. Pupils are equal, round, and reactive to light.   Neck: Normal range of motion. Neck supple. No thyromegaly present.   Cardiovascular: Normal rate and regular rhythm.  Exam reveals gallop and S4.    No murmur heard.  Pulmonary/Chest: Effort normal and breath sounds normal. He has no wheezes. He has no rales.   Abdominal: Soft. Bowel sounds are normal. He exhibits no mass. There is no tenderness. There is no rebound.   Musculoskeletal: Normal range of motion.   Lymphadenopathy:     He has no cervical adenopathy.   Neurological: He is alert and oriented to person, place, and time.   Skin: Skin is warm and dry.   Psychiatric: He has a normal mood and affect. His behavior is normal.   Nursing note and vitals reviewed.    Assessment:       1. Cryptococcal meningitis    2. Essential hypertension        Plan:   Patient denies symptoms at this time. He does have some blurry vision which he says has improved since starting treatment with fluconazole. Blood pressure is elevated but patient states its related to his visit.   · Continue fluconazole until antigen is zero.   · Cryptococcal antigen today.  · If cryptococcal antigen increases or he develops symptoms then will need repeat LP.   · Take antihypertensive therapy and monitor blood pressure. Further management by PCP.  · RTC in one month.

## 2018-02-13 LAB — CRYPTOC AG SER QL LA: NORMAL

## 2018-02-15 ENCOUNTER — TELEPHONE (OUTPATIENT)
Dept: INFECTIOUS DISEASES | Facility: CLINIC | Age: 78
End: 2018-02-15

## 2018-02-15 NOTE — TELEPHONE ENCOUNTER
----- Message from Lorraine Vides MD sent at 2/14/2018  7:53 AM CST -----  Results reviewed. Antigen continues to down trend. Continue fluconazole and follow up in 1 month.

## 2018-03-06 RX ORDER — PANTOPRAZOLE SODIUM 40 MG/1
TABLET, DELAYED RELEASE ORAL
Qty: 90 TABLET | Refills: 3 | Status: SHIPPED | OUTPATIENT
Start: 2018-03-06 | End: 2019-03-06 | Stop reason: SDUPTHER

## 2018-03-07 ENCOUNTER — TELEPHONE (OUTPATIENT)
Dept: INFUSION THERAPY | Facility: HOSPITAL | Age: 78
End: 2018-03-07

## 2018-03-10 RX ORDER — METOPROLOL SUCCINATE 50 MG/1
TABLET, EXTENDED RELEASE ORAL
Qty: 90 TABLET | Refills: 1 | Status: SHIPPED | OUTPATIENT
Start: 2018-03-10 | End: 2018-05-01 | Stop reason: CLARIF

## 2018-03-12 ENCOUNTER — OFFICE VISIT (OUTPATIENT)
Dept: INFECTIOUS DISEASES | Facility: CLINIC | Age: 78
End: 2018-03-12
Payer: MEDICARE

## 2018-03-12 VITALS
HEART RATE: 58 BPM | DIASTOLIC BLOOD PRESSURE: 80 MMHG | SYSTOLIC BLOOD PRESSURE: 168 MMHG | BODY MASS INDEX: 23.54 KG/M2 | WEIGHT: 159.38 LBS

## 2018-03-12 DIAGNOSIS — I10 ESSENTIAL HYPERTENSION: ICD-10-CM

## 2018-03-12 DIAGNOSIS — B45.1 CRYPTOCOCCAL MENINGOENCEPHALITIS: ICD-10-CM

## 2018-03-12 DIAGNOSIS — C91.12 CLL (CHRONIC LYMPHOID LEUKEMIA) IN RELAPSE: ICD-10-CM

## 2018-03-12 DIAGNOSIS — B45.1 CRYPTOCOCCAL MENINGITIS: Primary | ICD-10-CM

## 2018-03-12 PROCEDURE — 99213 OFFICE O/P EST LOW 20 MIN: CPT | Mod: PBBFAC,PO | Performed by: INTERNAL MEDICINE

## 2018-03-12 PROCEDURE — 99211 OFF/OP EST MAY X REQ PHY/QHP: CPT | Mod: S$PBB,,, | Performed by: INTERNAL MEDICINE

## 2018-03-12 PROCEDURE — 99999 PR PBB SHADOW E&M-EST. PATIENT-LVL III: CPT | Mod: PBBFAC,,, | Performed by: INTERNAL MEDICINE

## 2018-03-12 NOTE — PROGRESS NOTES
Subjective:      Patient ID: Jan Kelly is a 77 y.o. male.    Chief Complaint:Follow-up (cryptococcal meningitis)      History of Present Illness    A 77-year-old man with CLL and cryptococcal meningitis/meningoencephalitis who is seen for follow up of cryptococcal disease. Mr. Kelly was initially diagnosed In July 2017 with cryptococcal meningitis after presenting to [facility] with fever and altered mental status. He does not have any active symptoms at this time.   Ran out of fluconazole and took few days to be refilled by his pharmacy. Taking unknown medication for uncontrolled HTN and refused to tell me it's name. Started taking unknown medication once a day and increased to BID.      Review of Systems   Constitution: Negative for chills, decreased appetite, fever, weakness, malaise/fatigue, night sweats, weight gain and weight loss.   HENT: Negative for congestion, ear pain, hearing loss, hoarse voice, sore throat and tinnitus.    Eyes: Positive for visual disturbance (chronic and unchanged). Negative for blurred vision and redness.   Cardiovascular: Negative for chest pain, leg swelling and palpitations.   Respiratory: Negative for cough, hemoptysis, shortness of breath and sputum production.    Hematologic/Lymphatic: Negative for adenopathy. Does not bruise/bleed easily.   Skin: Negative for dry skin, itching, rash and suspicious lesions.   Musculoskeletal: Negative for back pain, joint pain, myalgias and neck pain.   Gastrointestinal: Negative for abdominal pain, constipation, diarrhea, heartburn, nausea and vomiting.   Genitourinary: Negative for dysuria, flank pain, frequency, hematuria, hesitancy and urgency.   Neurological: Negative for dizziness, headaches, numbness and paresthesias.   Psychiatric/Behavioral: Negative for depression and memory loss. The patient does not have insomnia and is not nervous/anxious.      Objective:   Physical Exam   Constitutional: He is oriented to person, place,  and time. He appears well-developed and well-nourished.   HENT:   Head: Normocephalic and atraumatic.   Right Ear: External ear normal.   Left Ear: External ear normal.   Mouth/Throat: Oropharynx is clear and moist.   Eyes: Conjunctivae and EOM are normal. Pupils are equal, round, and reactive to light.   Neck: Normal range of motion. Neck supple. No thyromegaly present.   Cardiovascular: Normal rate, regular rhythm and normal heart sounds.    No murmur heard.  Pulmonary/Chest: Effort normal and breath sounds normal. He has no wheezes. He has no rales.   Abdominal: Soft. Bowel sounds are normal. He exhibits no mass. There is no tenderness. There is no rebound.   Musculoskeletal: Normal range of motion.   Lymphadenopathy:     He has no cervical adenopathy.   Neurological: He is alert and oriented to person, place, and time.   Skin: Skin is warm and dry.   White discoloration of the nails in band form.   Psychiatric: He has a normal mood and affect. His behavior is normal.   Vitals reviewed.    Laboratory:     Component      Latest Ref Rng & Units 3/2/2018 2/12/2018 12/7/2017 10/26/2017   Cryptococcal Ag, Blood        Positive 1:4 Positive 1:16 Positive 1:32   Crypto Ag      Negative Negative        Assessment:       1. Cryptococcal meningitis    2. Cryptococcal meningoencephalitis    3. CLL (chronic lymphoid leukemia) in relapse    4. Essential hypertension        Plan:   Cryptoccocal meningitis/meningoencephalitis  Patient denies active symptoms. Cryptococcal antigen down from 1:4 on 2/12 to negative on 3/2. Patient refused to wait 4 week before repeat as per my orders.  · Discontinue fluconazole.  CLL and Essential HTN  Chronic. Elevated Bp.  · Management per PCP.  Follow up  · RTC as needed.

## 2018-03-13 ENCOUNTER — INFUSION (OUTPATIENT)
Dept: INFUSION THERAPY | Facility: HOSPITAL | Age: 78
End: 2018-03-13
Attending: INTERNAL MEDICINE
Payer: MEDICARE

## 2018-03-13 DIAGNOSIS — C91.10 LEUKEMIA, LYMPHOCYTIC, CHRONIC: Primary | ICD-10-CM

## 2018-03-13 PROCEDURE — A4216 STERILE WATER/SALINE, 10 ML: HCPCS | Mod: PN | Performed by: PHYSICIAN ASSISTANT

## 2018-03-13 PROCEDURE — 96523 IRRIG DRUG DELIVERY DEVICE: CPT | Mod: PN

## 2018-03-13 PROCEDURE — 25000003 PHARM REV CODE 250: Mod: PN | Performed by: PHYSICIAN ASSISTANT

## 2018-03-13 PROCEDURE — 63600175 PHARM REV CODE 636 W HCPCS: Mod: PN | Performed by: PHYSICIAN ASSISTANT

## 2018-03-13 RX ORDER — HEPARIN 100 UNIT/ML
500 SYRINGE INTRAVENOUS
Status: COMPLETED | OUTPATIENT
Start: 2018-03-13 | End: 2018-03-13

## 2018-03-13 RX ORDER — HEPARIN 100 UNIT/ML
500 SYRINGE INTRAVENOUS
Status: CANCELLED | OUTPATIENT
Start: 2018-03-13

## 2018-03-13 RX ORDER — SODIUM CHLORIDE 0.9 % (FLUSH) 0.9 %
10 SYRINGE (ML) INJECTION
Status: CANCELLED
Start: 2018-03-13

## 2018-03-13 RX ORDER — SODIUM CHLORIDE 0.9 % (FLUSH) 0.9 %
10 SYRINGE (ML) INJECTION
Status: DISCONTINUED | OUTPATIENT
Start: 2018-03-13 | End: 2018-03-13 | Stop reason: HOSPADM

## 2018-03-13 RX ADMIN — SODIUM CHLORIDE, PRESERVATIVE FREE 10 ML: 5 INJECTION INTRAVENOUS at 03:03

## 2018-03-13 RX ADMIN — HEPARIN 500 UNITS: 100 SYRINGE at 03:03

## 2018-03-13 NOTE — PATIENT INSTRUCTIONS
Preventing Falls in the Home  An adult or child can fall for many reasons. If you are an older adult, you may fall because your reaction time slows down. Your muscles and joints may get stiff, weak, or less flexible because of illness, medicines, or a physical condition. These things can also make a child more likely to fall or be injured in a fall.  Other health problems that make falls more likely include:  · Arthritis  · Dizziness or lightheadedness when you get out of bed (orthostatic hypotension)  · History of a stroke  · Dizziness  · Anemia  · Certain medicines taken for mental illness  · Problems with balance or gait  · History of falls with or without an injury  · Changes in vision (vision impairment)  · Changes in thinking skills and memory (cognitive impairment)  Injuries from a fall can include broken bones, dislocated joints, and cuts. When these injuries are serious enough, they can make it impossible for you or a child who is injured in a fall to live on his or her own.  Prevention tips  To help prevent falls and fall-related injuries, follow the tips below.   Floors  Make floors safer by doing the following:   · Put nonskid pads under area rugs.  · Remove throw rugs.  · Replace worn floor coverings.  · Tack carpets firmly to each step on carpeted stairs. Put nonskid strips on the edges of uncarpeted stairs.  · Keep floors and stairs free of clutter and cords.  · Arrange furniture so there are clear pathways.  · Clean up any spills right away.  · Wear shoes that fit.  Bathrooms    Make bathrooms safer by doing the following:   · Install grab bars in the tub or shower.  · Apply nonskid strips or put a nonskid rubber mat in the tub or shower.  · Sit on a bath chair to bathe.  · Use bathmats with nonskid backing.  Lighting and the environment  Improve lighting in your home by doing the following:   · Keep a flashlight in each room. Or put a lamp next to the bed within easy reach.  · Put nightlights in  the bedrooms, hallways, kitchen, and bathrooms.  · Make sure all stairways have good lighting.  · Take your time when going up and down stairs.  · Put handrails on both sides of stairs and in walkways for more support. To prevent injury to your wrist or arm, dont use handrails to pull yourself up.  · Install grab bars to pull yourself up.  · Move or rearrange items that you use often. This will make them easier to find or reach.  · Look at your home to find any safety hazards. Especially look at doorways, walkways, and the driveway. Remove or repair any safety problems that you find.  Date Last Reviewed: 8/1/2016 © 2000-2017 [employer]. [address] [place] [address], [place] [address]. All rights reserved. This information is not intended as a substitute for professional medical care. Always follow your healthcare professional's instructions.        Oncology: Preventing Infections  Chemotherapy can make your body less able to fight off infection. This happens because treatment reduces the number of white blood cells. White blood cells fight infection in your body. To help prevent infections, follow the tips below.     Scrub your hands with soap and warm water for at least 15 seconds.   Know your emilio  The emilio is the time during your chemotherapy cycle when you have the fewest white blood cells. The length of your emilio and when it occurs depend on the medicines you are taking. Each medicine has its own emilio. Talk with your doctor or nurse about your emilio period. Then take extra precautions to prevent infection at that time.  Protect yourself  · Keep your hands clean. To reduce your risk of infection, bathe every day and wash your hands often throughout the day. For best results, lather them with soap for at least 15 seconds. Wash your hands before eating, after spending time in public places, and after using the bathroom.  · Stay away from some foods. Limit your risk. Dont eat uncooked or undercooked  meat or fish. You may also be told not to eat raw vegetables or thin-skinned fruits during your emilio.  · Reduce your risk for illness. During this time your body is less able to fight off colds, measles, and other illnesses. Stay away from anyone who has a fever or an infection. Also stay away from large crowds during your emilio.  · Wear gloves. Make it harder for infection to enter your body. Wear gloves when you work around germs and dirt. Have someone else clean a pets tank, cage, or litter box.  · Try not to accidentally cut yourself. Protect your feet from injury and germs by not walking barefoot.  How medicines can help  · Prevent and treat infection. Antibiotics work in this way by attacking and killing the germs that cause infection.  · Trigger new cell growth. These medicines cause your body to make new white blood cells. Neupogen is an example of such a medicine.  Talk with your doctor about the best medicines for you. In some cases, your doctor may tell you to not take acetaminophen or NSAIDs for pain relief because these medicines can mask a fever if you have neutropenia. Talk with your doctor about medicines for pain control if you need it during your emilio period.  When to seek medical advice  Contact your doctor right away if you have any of the following:  · Fever of 100.4ºF (38ºC) or higher, or as directed by your healthcare provider  · Burning when you urinate  · Severe coughing or sore throat  · Shortness of breath, sweating, or chills  · Pain, especially near an open wound or catheter site   Date Last Reviewed: 1/3/2016  © 6450-4764 [employer]. [address] [place] [address], [place] [address]. All rights reserved. This information is not intended as a substitute for professional medical care. Always follow your healthcare professional's instructions.        Central Line Infections     Good handwashing helps prevent central line infections.   You need a central line as part of your  treatment. Its also called a central venous access device (CVAD) or central venous catheter (CVC). A small, soft tube called a catheter is put in a vein that leads to your heart. The central line is used instead of a standard IV (intravenous) line. It does not need to be replaced as often as a standard IV. This means less pain and fewer needlesticks during treatment. But central lines come with a risk of infection. This sheet tells you more about central line infections and what hospitals are doing to prevent them. And it explains how an infection is treated, if one occurs.  Types of central lines  With a central line, a catheter is inserted into your body through a vein that leads to the large vein near the heart (vena cava). Types of central lines and their risk of infection are listed below. Which type is best for you depends on your needs and your overall health. Your healthcare provider can tell you which type of line you need, and why.  · Peripherally inserted central catheter (PICC). This is placed in a large vein in the upper arm, or near the bend of the elbow.  · Subclavian line. This is placed in a vein that runs behind the collarbone.  · Internal jugular line. This is placed into a large vein in the neck. Infection risk is higher than with a PICC or subclavian line, but lower than with a femoral line.  · Femoral line. This may be placed in a large vein in the groin. This site is generally not used because of an increased risk for infection.   · Tunneled catheter. This is run through the soft tissue under the skin before it enters a vein. A small cuff helps hold the catheter in place. Both the tunnel and the cuff help prevent infection. This type of catheter may be placed in any of the above locations.  · Port. This small device is placed completely under the skin on the arm or chest. Its connected to a catheter that is threaded into the vena cava.  Types of infections  A central line provides a direct  path into your bloodstream. This gives germs possible access into your body. All types of central lines are associated with some risk of infection. Often, the germs that cause a central line infection come from your own skin. There are 2 possible types of infection:  · Local infection. This can occur where the central line enters your body. Symptoms include redness, pain, or swelling at or near the catheter site, pain or tenderness along the path of the catheter, and drainage from the skin around the catheter.  · Systemic infection(also called bacteremia). This can occur if germs get into the bloodstream. This is very serious and can be fatal. Symptoms include sudden fever, shaking chills, a racing heartbeat, confusion, change in behavior, and a skin rash.  Risk factors for infection  Anyone who has a central line can get an infection. Your risk is higher if you:  · Are in the intensive care unit (ICU).  · Have a weakened immune system or serious illness.  · Are receiving bone marrow or chemotherapy.  · Have the line for an extended time.  · Have a central line in your neck or groin.  How central line infections are treated  Treatment depends on the type of central line, how severe the infection is, and your overall health. Your healthcare provider will prescribe antibiotics to fight the infection. The line may also need to be removed. In some cases, the line is flushed with high doses of antibiotics. This may kill the germs causing the infection, so the line doesnt have to be removed.  What hospitals do to prevent infection  Hospitals have a plan to reduce central line infections. This plan includes:  · Good hand hygiene. Hospital staff clean their hands before and after touching the line. They wash their hands with soap and water. Or they use an alcohol-based hand  containing at least 60% alcohol.  · Using sterile practices during placement. The healthcare worker who places the line wears germ-free  (sterile) clothing including a long-sleeved gown and gloves. Before the line is placed, your skin is cleaned with an antiseptic solution. During placement, you are fully covered with a large sterile sheet (a sterile drape). Only the spot where the line is placed is exposed. After placement, the site where the line enters the body is covered with a sterile bandage (dressing).  · Choosing a lower-risk vein. Whenever possible, the line is placed in the vein that's right for your treatment and has the lowest infection risk. Some hospitals use lines coated with an antiseptic to reduce the chance of infection.  · Checking for infection. The line is checked frequently for infection. It is removed as soon as you no longer need it.  What you can do to prevent infection  Before you get a central line, ask questions. Find out why you need the line and where it will be placed. Learn what steps the hospital is taking to reduce your infection risk. Once the line has been placed, you, your caretakers, and any visitors can help prevent infection by doing the following:  · Use good hand hygiene. Wash your hands often with soap and water, and use alcohol-based hand gel as directed. To clean your hands effectively, follow the guidelines on this sheet. Visitors should wash hands well when they arrive and when they leave.  · Make sure healthcare staff clean their hands. They should use soap and water or an alcohol-based hand  before and after checking the line. Dont be afraid to remind them.  · Keep the line dry. Follow your providers guidelines for showering. If the dressing does get wet, tell your healthcare provider right away.  · Dont touch the line. Even when your hands are clean, try not to touch the catheter or dressing.  · Learn the sterile dressing technique. This is important if you will be caring for the line at home. Your provider can show you what to do.  Risk for blood clot  If a blood clot forms it can block  blood flow through the vein where the catheter is placed. Signs of a blood clot include pain or swelling in the neck, face, chest, or arm. If you have any of these symptoms, call your healthcare provider right away. You may need an ultrasound exam to locate the blood clot and receive treatment with a blood thinner.    How to wash your hands  To protect the central line from germs, its very important to wash your hands often and clean them well. You and anyone who comes in contact with you should follow these steps:  · Wet your hands with warm water. (Avoid hot water. It can cause skin irritation when you wash your hands often.)  · Apply enough soap to cover the entire surface of your hands, including your fingers.  · Rub your hands together briskly for at least 15 seconds. Make sure to rub the front and back of each hand up to the wrist, your fingers and fingernails, between the fingers, and each thumb.  · Rinse your hands with warm water.  · Dry your hands completely with a new, unused paper towel. Dont use a cloth towel or other reusable towel. These can harbor germs.  · Use the paper towel to turn off the faucet, then throw it away. If youre in a bathroom, also use a paper towel to open the door instead of touching the handle.  Using alcohol-based hand gels  When you dont have access to soap and water, alcohol-based hand gels are a good choice for cleaning your hands. The gel should have at least 60% alcohol. Note that some germs can't be killed by alcohol. Your healthcare team can answer any questions you have about when to use hand gel, or when its better to wash with soap and water. Follow these steps:  · Spread about 1 tablespoon of gel in the palm of one hand. (Check the package for specific guidelines.)  · Rub your hands together briskly. Clean the backs of your hands, the palms, between your fingers, and up your wrists.  · Rub until the gel is gone and your hands are completely dry.   When to seek  medical care  Call your healthcare provider right away if you have a central line and develop any of the following:  · Pain or burning in your shoulder, chest, back, arm, or leg  · Fever of 100.4°F (38.0°C) or higher  · Chills  · Signs of infection at the catheter site (pain, redness, drainage, burning, or stinging)  · Coughing, wheezing, or shortness of breath  · A racing or irregular heartbeat  · Muscle stiffness or trouble moving  · Gurgling noises coming from the catheter  · The catheter falls out, breaks, cracks, leaks, or has other damage  Date Last Reviewed: 7/1/2016  © 1636-0347 [employer]. [address] [place] [address], [place] [address]. All rights reserved. This information is not intended as a substitute for professional medical care. Always follow your healthcare professional's instructions.

## 2018-04-09 ENCOUNTER — OFFICE VISIT (OUTPATIENT)
Dept: CARDIOLOGY | Facility: CLINIC | Age: 78
End: 2018-04-09
Payer: MEDICARE

## 2018-04-09 VITALS
HEART RATE: 70 BPM | BODY MASS INDEX: 25.24 KG/M2 | DIASTOLIC BLOOD PRESSURE: 79 MMHG | WEIGHT: 170.44 LBS | SYSTOLIC BLOOD PRESSURE: 165 MMHG | HEIGHT: 69 IN

## 2018-04-09 DIAGNOSIS — I25.10 CORONARY ARTERY DISEASE INVOLVING NATIVE CORONARY ARTERY OF NATIVE HEART WITHOUT ANGINA PECTORIS: ICD-10-CM

## 2018-04-09 DIAGNOSIS — E78.00 HYPERCHOLESTEREMIA: ICD-10-CM

## 2018-04-09 DIAGNOSIS — I49.49 PREMATURE BEATS: ICD-10-CM

## 2018-04-09 DIAGNOSIS — I10 ESSENTIAL HYPERTENSION: Primary | ICD-10-CM

## 2018-04-09 DIAGNOSIS — I34.0 NON-RHEUMATIC MITRAL REGURGITATION: ICD-10-CM

## 2018-04-09 PROCEDURE — 99214 OFFICE O/P EST MOD 30 MIN: CPT | Mod: S$PBB,,, | Performed by: INTERNAL MEDICINE

## 2018-04-09 PROCEDURE — 99213 OFFICE O/P EST LOW 20 MIN: CPT | Mod: PO | Performed by: INTERNAL MEDICINE

## 2018-04-09 RX ORDER — AMLODIPINE BESYLATE 2.5 MG/1
2.5 TABLET ORAL DAILY
Qty: 30 TABLET | Refills: 5 | Status: SHIPPED | OUTPATIENT
Start: 2018-04-09 | End: 2018-10-08 | Stop reason: SDUPTHER

## 2018-04-09 NOTE — PROGRESS NOTES
Subjective:    Patient ID:  Jan Kelly is a 77 y.o. male who presents for Hypertension and Coronary Artery Disease        HPI  BP  HAS BEEN ELEVATED > 200, RECENT LABS NOTED, CMP OK, , DR VOGT INCREASED RAMIPRIL TO 10 MG, BP BETTER STILL HIGH,OCC LUCY CATH DISCOMFORT,TO HAVE INGUINAL,  HERNIA SURGERY, NOT COMPLIANT WITH ALL MEDS, SEE ROS    Past Medical History:   Diagnosis Date    Cancer     CLL    Cardiomyopathy     Chronic lymphocytic leukemia     Coronary artery disease     wih stents    Diabetes mellitus     Heart block     Heart murmur     Hyperlipidemia     Hypertension     Valvular regurgitation      Past Surgical History:   Procedure Laterality Date    CARDIAC CATHETERIZATION      CHOLECYSTECTOMY      COLONOSCOPY      COLONOSCOPY N/A 1/6/2017    Procedure: COLONOSCOPY;  Surgeon: Remy Pardo MD;  Location: [facility];  Service: Endoscopy;  Laterality: N/A;    CORONARY ANGIOPLASTY      CORONARY STENT PLACEMENT      PORTACATH PLACEMENT      TONSILLECTOMY       Family History   Problem Relation Age of Onset    Heart disease Father     Hypertension Father      Social History     Social History    Marital status:      Spouse name: N/A    Number of children: N/A    Years of education: N/A     Social History Main Topics    Smoking status: Former Smoker     Quit date: 1970    Smokeless tobacco: Never Used    Alcohol use No      Comment: rarely    Drug use: No    Sexual activity: Not on file     Other Topics Concern    Not on file     Social History Narrative    No narrative on file       Review of patient's allergies indicates:   Allergen Reactions    Penicillins     Titanium dioxide        Current Outpatient Prescriptions:     aspirin (ECOTRIN) 81 MG EC tablet, Take 81 mg by mouth every morning. , Disp: , Rfl:     cyanocobalamin (VITAMIN B-12) 1000 MCG tablet, Take 1,000 mcg by mouth once daily. , Disp: , Rfl:     HUMALOG KWIKPEN 100 unit/mL InPn  pen, INJECT 1 UNIT UNDER THE SKIN PRF HIGH BLOOD SUGAR UTD, Disp: , Rfl: 3    hydrocodone-acetaminophen 10-325mg (NORCO)  mg Tab, Take 1 tablet by mouth every 12 (twelve) hours as needed for Pain., Disp: 100 tablet, Rfl: 0    IMBRUVICA 140 mg Cap, Take 3 capsules by mouth once daily at 6am., Disp: 90 capsule, Rfl: 6    metoprolol succinate (TOPROL-XL) 50 MG 24 hr tablet, TAKE 1 TABLET(50 MG) BY MOUTH EVERY DAY, Disp: 90 tablet, Rfl: 1    pantoprazole (PROTONIX) 40 MG tablet, TK 1 T PO D 30 MIN B JIL, Disp: 90 tablet, Rfl: 3    pravastatin (PRAVACHOL) 10 MG tablet, TAKE 1 TABLET(10 MG) BY MOUTH EVERY EVENING, Disp: 90 tablet, Rfl: 0    ramipril (ALTACE) 5 MG capsule, Take 1 capsule (5 mg total) by mouth once daily. (Patient taking differently: Take 10 mg by mouth once daily. ), Disp: 90 capsule, Rfl: 3    amLODIPine (NORVASC) 2.5 MG tablet, Take 1 tablet (2.5 mg total) by mouth once daily., Disp: 30 tablet, Rfl: 5    hydroxyurea (HYDREA) 500 mg Cap, Take 2 capsules (1,000 mg total) by mouth 2 (two) times daily., Disp: 60 capsule, Rfl: 3    potassium chloride (MICRO-K) 10 MEQ CpSR, Take 2 capsules (20 mEq total) by mouth once daily., Disp: 14 capsule, Rfl: 1    Current Facility-Administered Medications:     sodium chloride 0.9% flush 10 mL, 10 mL, Intravenous, PRN, Regino Washington MD, 10 mL at 09/29/17 0824    Review of Systems   Constitution: Negative for chills, decreased appetite, diaphoresis, fever, weakness, malaise/fatigue and night sweats. Weight loss: BETTER NOW.   HENT: Negative for congestion and nosebleeds.    Eyes: Negative for blurred vision, double vision and visual disturbance.   Cardiovascular: Negative for chest pain, claudication, cyanosis, dyspnea on exertion (MILD OCC), irregular heartbeat (OCC), leg swelling, near-syncope, orthopnea, palpitations, paroxysmal nocturnal dyspnea and syncope.   Respiratory: Negative for cough, hemoptysis, shortness of breath and wheezing.    Endocrine:  "Negative for cold intolerance and heat intolerance.   Hematologic/Lymphatic: Negative for adenopathy and bleeding problem. Does not bruise/bleed easily.   Skin: Negative for color change, itching, nail changes and rash.   Musculoskeletal: Negative for back pain and falls. Arthritis: mild.   Gastrointestinal: Negative for abdominal pain, change in bowel habit, dysphagia, heartburn, hematemesis, jaundice and melena.   Genitourinary: Negative for dysuria, flank pain and frequency.   Neurological: Negative for brief paralysis, dizziness (occ), focal weakness, light-headedness, loss of balance, numbness, paresthesias and tremors.   Psychiatric/Behavioral: Negative for altered mental status. The patient is not nervous/anxious (SOME).    Allergic/Immunologic: Negative for hives and persistent infections.        Objective:      Vitals:    04/09/18 1338   BP: (!) 165/79   Pulse: 70   Weight: 77.3 kg (170 lb 6.7 oz)   Height: 5' 9" (1.753 m)   PainSc: 0-No pain     Body mass index is 25.17 kg/m².    Physical Exam   Constitutional: He is oriented to person, place, and time. He appears well-developed and well-nourished.   HENT:   Head: Normocephalic and atraumatic.   Mouth/Throat: Oropharynx is clear and moist.   Eyes: Conjunctivae and EOM are normal. Pupils are equal, round, and reactive to light.   Neck: Neck supple. Normal carotid pulses, no hepatojugular reflux and no JVD present. Carotid bruit is not present. No tracheal deviation present. No thyromegaly present.   Cardiovascular: Normal rate and regular rhythm.  Exam reveals no gallop, no distant heart sounds, no friction rub and no midsystolic click.       Medium-pitched early systolic murmur is present  at the apex  Pulses:       Carotid pulses are 2+ on the right side, and 2+ on the left side.       Radial pulses are 2+ on the right side, and 2+ on the left side.        Femoral pulses are 2+ on the right side, and 2+ on the left side.       Dorsalis pedis pulses are 2+ " on the right side, and 2+ on the left side.        Posterior tibial pulses are 2+ on the right side, and 2+ on the left side.   Pulmonary/Chest: Effort normal and breath sounds normal. No tachypnea and no bradypnea.   Abdominal: Soft. Bowel sounds are normal. He exhibits no distension and no mass. There is no tenderness.   Musculoskeletal: Normal range of motion. He exhibits no edema.   Lymphadenopathy:     He has no cervical adenopathy.     He has no axillary adenopathy.   Neurological: He is alert and oriented to person, place, and time. No cranial nerve deficit. Coordination normal.   Skin: Skin is warm and dry. No erythema.   Psychiatric: He has a normal mood and affect. His speech is normal. Judgment and thought content normal. He is agitated.               ..    Chemistry        Component Value Date/Time     03/21/2018 1349    K 4.4 03/21/2018 1349     03/21/2018 1349    CO2 30 03/21/2018 1349    BUN 20 03/21/2018 1349    CREATININE 0.98 03/21/2018 1349     (H) 03/21/2018 1349        Component Value Date/Time    CALCIUM 9.1 03/21/2018 1349    ALKPHOS 77 03/21/2018 1349    AST 22 03/21/2018 1349    AST 33 02/18/2016 1158    ALT 28 03/21/2018 1349    BILITOT 0.5 03/21/2018 1349    ESTGFRAFRICA >60 03/21/2018 1349    EGFRNONAA >60 03/21/2018 1349            ..  Lab Results   Component Value Date    CHOL 114 (L) 03/17/2017    CHOL 135 08/19/2016    CHOL 164 05/16/2016     Lab Results   Component Value Date    HDL 40 03/17/2017    HDL 32 (L) 08/19/2016    HDL 39 (L) 05/16/2016     Lab Results   Component Value Date    LDLCALC 65.8 03/17/2017    LDLCALC 85.4 08/19/2016    LDLCALC 108.0 05/16/2016     Lab Results   Component Value Date    TRIG 41 03/17/2017    TRIG 88 08/19/2016    TRIG 85 05/16/2016     Lab Results   Component Value Date    CHOLHDL 35.1 03/17/2017    CHOLHDL 23.7 08/19/2016    CHOLHDL 23.8 05/16/2016     ..  Lab Results   Component Value Date    WBC 11.08 03/21/2018    HGB 12.0  (L) 03/21/2018    HCT 37.5 (L) 03/21/2018    MCV 97 03/21/2018    PLT 99 (L) 03/21/2018       Test(s) Reviewed  I have reviewed the following in detail:  [] Stress test   [] Angiography   [] Echocardiogram   [x] Labs   [] Other:       Assessment:         ICD-10-CM ICD-9-CM   1. Essential hypertension I10 401.9   2. Non-rheumatic mitral regurgitation I34.0 424.0   3. Coronary artery disease involving native coronary artery of native heart without angina pectoris I25.10 414.01   4. Premature beats I49.49 427.60   5. Hypercholesteremia E78.00 272.0     Problem List Items Addressed This Visit        Cardiac/Vascular    Essential hypertension - Primary    Coronary artery disease involving native coronary artery of native heart without angina pectoris    Non-rheumatic mitral regurgitation    Hypercholesteremia    Premature beats           Plan:     ADD NORVASC FOR BP, AFTERLOAD REDUCTION, COMPLIANCE WITH MEDS, ALL  OTHER CV CLINICALLY STABLE, NO ANGINA, NO HF, NO TIA, NO CLINICAL ARRHYTHMIA,CONTINUE CURRENT MEDS, EDUCATION, DIET, EXERCISE, RTC IN 6 MO WITH BP LOG      Essential hypertension    Non-rheumatic mitral regurgitation  Comments:  AFTERLOAD REDUCTION    Coronary artery disease involving native coronary artery of native heart without angina pectoris    Premature beats    Hypercholesteremia    Other orders  -     amLODIPine (NORVASC) 2.5 MG tablet; Take 1 tablet (2.5 mg total) by mouth once daily.  Dispense: 30 tablet; Refill: 5    RTC Low level/low impact aerobic exercise 5x's/wk. Heart healthy diet and risk factor modification.    See labs and med orders.    Aerobic exercise 5x's/wk. Heart healthy diet and risk factor modification.    See labs and med orders.

## 2018-04-24 ENCOUNTER — DOCUMENTATION ONLY (OUTPATIENT)
Dept: INFUSION THERAPY | Facility: HOSPITAL | Age: 78
End: 2018-04-24

## 2018-04-24 NOTE — PROGRESS NOTES
Pt no show for port flush appointment. Spoke with pt via telephone and transferred to  to reschedule appointment.

## 2018-05-08 PROBLEM — K40.90 RIGHT INGUINAL HERNIA: Status: ACTIVE | Noted: 2018-05-08

## 2018-05-14 RX ORDER — PRAVASTATIN SODIUM 10 MG/1
TABLET ORAL
Qty: 90 TABLET | Refills: 1 | Status: SHIPPED | OUTPATIENT
Start: 2018-05-14 | End: 2018-10-08 | Stop reason: SDUPTHER

## 2018-05-14 RX ORDER — METOPROLOL SUCCINATE 50 MG/1
TABLET, EXTENDED RELEASE ORAL
Qty: 30 TABLET | Refills: 4 | Status: SHIPPED | OUTPATIENT
Start: 2018-05-14 | End: 2018-10-11 | Stop reason: SDUPTHER

## 2018-05-18 PROBLEM — N43.3 LEFT HYDROCELE: Status: ACTIVE | Noted: 2018-05-18

## 2018-05-18 PROBLEM — T14.8XXA HEMATOMA: Status: ACTIVE | Noted: 2018-05-18

## 2018-05-18 PROBLEM — K40.91 RECURRENT RIGHT INGUINAL HERNIA: Status: ACTIVE | Noted: 2018-05-18

## 2018-05-18 PROBLEM — L76.82 INCISIONAL PAIN: Status: ACTIVE | Noted: 2018-05-18

## 2018-05-18 PROBLEM — N50.89 SCROTAL EDEMA: Status: ACTIVE | Noted: 2018-05-18

## 2018-06-04 ENCOUNTER — INFUSION (OUTPATIENT)
Dept: INFUSION THERAPY | Facility: HOSPITAL | Age: 78
End: 2018-06-04
Attending: INTERNAL MEDICINE
Payer: MEDICARE

## 2018-06-04 DIAGNOSIS — C91.10 LEUKEMIA, LYMPHOCYTIC, CHRONIC: Primary | ICD-10-CM

## 2018-06-04 PROCEDURE — 63600175 PHARM REV CODE 636 W HCPCS: Mod: PN | Performed by: PHYSICIAN ASSISTANT

## 2018-06-04 PROCEDURE — 96523 IRRIG DRUG DELIVERY DEVICE: CPT | Mod: PN

## 2018-06-04 PROCEDURE — 25000003 PHARM REV CODE 250: Mod: PN | Performed by: PHYSICIAN ASSISTANT

## 2018-06-04 PROCEDURE — A4216 STERILE WATER/SALINE, 10 ML: HCPCS | Mod: PN | Performed by: PHYSICIAN ASSISTANT

## 2018-06-04 RX ORDER — HEPARIN 100 UNIT/ML
500 SYRINGE INTRAVENOUS
Status: CANCELLED | OUTPATIENT
Start: 2018-06-04

## 2018-06-04 RX ORDER — HEPARIN 100 UNIT/ML
500 SYRINGE INTRAVENOUS
Status: COMPLETED | OUTPATIENT
Start: 2018-06-04 | End: 2018-06-04

## 2018-06-04 RX ORDER — SODIUM CHLORIDE 0.9 % (FLUSH) 0.9 %
10 SYRINGE (ML) INJECTION
Status: CANCELLED
Start: 2018-06-04

## 2018-06-04 RX ORDER — SODIUM CHLORIDE 0.9 % (FLUSH) 0.9 %
10 SYRINGE (ML) INJECTION
Status: DISCONTINUED | OUTPATIENT
Start: 2018-06-04 | End: 2018-06-04 | Stop reason: HOSPADM

## 2018-06-04 RX ADMIN — HEPARIN SODIUM (PORCINE) LOCK FLUSH IV SOLN 100 UNIT/ML 500 UNITS: 100 SOLUTION at 09:06

## 2018-06-04 RX ADMIN — Medication 10 ML: at 09:06

## 2018-07-30 ENCOUNTER — INFUSION (OUTPATIENT)
Dept: INFUSION THERAPY | Facility: HOSPITAL | Age: 78
End: 2018-07-30
Attending: INTERNAL MEDICINE
Payer: MEDICARE

## 2018-07-30 DIAGNOSIS — C91.10 LEUKEMIA, LYMPHOCYTIC, CHRONIC: Primary | ICD-10-CM

## 2018-07-30 PROCEDURE — A4216 STERILE WATER/SALINE, 10 ML: HCPCS | Mod: PN | Performed by: PHYSICIAN ASSISTANT

## 2018-07-30 PROCEDURE — 96523 IRRIG DRUG DELIVERY DEVICE: CPT | Mod: PN

## 2018-07-30 PROCEDURE — 63600175 PHARM REV CODE 636 W HCPCS: Mod: PN | Performed by: PHYSICIAN ASSISTANT

## 2018-07-30 PROCEDURE — 25000003 PHARM REV CODE 250: Mod: PN | Performed by: PHYSICIAN ASSISTANT

## 2018-07-30 RX ORDER — SODIUM CHLORIDE 0.9 % (FLUSH) 0.9 %
10 SYRINGE (ML) INJECTION
Status: CANCELLED
Start: 2018-07-30

## 2018-07-30 RX ORDER — HEPARIN 100 UNIT/ML
500 SYRINGE INTRAVENOUS
Status: COMPLETED | OUTPATIENT
Start: 2018-07-30 | End: 2018-07-30

## 2018-07-30 RX ORDER — SODIUM CHLORIDE 0.9 % (FLUSH) 0.9 %
10 SYRINGE (ML) INJECTION
Status: DISCONTINUED | OUTPATIENT
Start: 2018-07-30 | End: 2018-07-30 | Stop reason: HOSPADM

## 2018-07-30 RX ORDER — HEPARIN 100 UNIT/ML
500 SYRINGE INTRAVENOUS
Status: CANCELLED | OUTPATIENT
Start: 2018-07-30

## 2018-07-30 RX ADMIN — HEPARIN 500 UNITS: 100 SYRINGE at 11:07

## 2018-07-30 RX ADMIN — SODIUM CHLORIDE, PRESERVATIVE FREE 10 ML: 5 INJECTION INTRAVENOUS at 11:07

## 2018-08-09 ENCOUNTER — LAB VISIT (OUTPATIENT)
Dept: LAB | Facility: HOSPITAL | Age: 78
End: 2018-08-09
Attending: INTERNAL MEDICINE
Payer: MEDICARE

## 2018-08-09 DIAGNOSIS — C91.12 CLL (CHRONIC LYMPHOID LEUKEMIA) IN RELAPSE: ICD-10-CM

## 2018-08-09 LAB
ALBUMIN SERPL BCP-MCNC: 4.4 G/DL
ALP SERPL-CCNC: 64 U/L
ALT SERPL W/O P-5'-P-CCNC: 32 U/L
ANION GAP SERPL CALC-SCNC: 10 MMOL/L
ANISOCYTOSIS BLD QL SMEAR: SLIGHT
AST SERPL-CCNC: 21 U/L
BASOPHILS NFR BLD: 0 %
BILIRUB SERPL-MCNC: 0.7 MG/DL
BUN SERPL-MCNC: 17 MG/DL
BURR CELLS BLD QL SMEAR: ABNORMAL
CALCIUM SERPL-MCNC: 9.4 MG/DL
CHLORIDE SERPL-SCNC: 101 MMOL/L
CO2 SERPL-SCNC: 27 MMOL/L
CREAT SERPL-MCNC: 1.09 MG/DL
DIFFERENTIAL METHOD: ABNORMAL
EOSINOPHIL NFR BLD: 0 %
ERYTHROCYTE [DISTWIDTH] IN BLOOD BY AUTOMATED COUNT: 13.3 %
EST. GFR  (AFRICAN AMERICAN): >60 ML/MIN/1.73 M^2
EST. GFR  (NON AFRICAN AMERICAN): >60 ML/MIN/1.73 M^2
GLUCOSE SERPL-MCNC: 178 MG/DL
HCT VFR BLD AUTO: 41.3 %
HGB BLD-MCNC: 13.3 G/DL
LYMPHOCYTES NFR BLD: 89 %
MCH RBC QN AUTO: 30.9 PG
MCHC RBC AUTO-ENTMCNC: 32.2 G/DL
MCV RBC AUTO: 96 FL
MONOCYTES NFR BLD: 1 %
NEUTROPHILS # BLD AUTO: 1.3 K/UL
NEUTROPHILS NFR BLD: 9 %
NEUTS BAND NFR BLD MANUAL: 1 %
NRBC BLD-RTO: 0 /100 WBC
OVALOCYTES BLD QL SMEAR: ABNORMAL
PLATELET # BLD AUTO: 88 K/UL
PLATELET BLD QL SMEAR: ABNORMAL
PMV BLD AUTO: 11.8 FL
POIKILOCYTOSIS BLD QL SMEAR: SLIGHT
POTASSIUM SERPL-SCNC: 4.2 MMOL/L
PROT SERPL-MCNC: 6.4 G/DL
RBC # BLD AUTO: 4.31 M/UL
SODIUM SERPL-SCNC: 138 MMOL/L
WBC # BLD AUTO: 12.7 K/UL

## 2018-08-09 PROCEDURE — 85007 BL SMEAR W/DIFF WBC COUNT: CPT | Mod: PN

## 2018-08-09 PROCEDURE — 80053 COMPREHEN METABOLIC PANEL: CPT | Mod: PN

## 2018-08-09 PROCEDURE — 80053 COMPREHEN METABOLIC PANEL: CPT

## 2018-08-09 PROCEDURE — 85027 COMPLETE CBC AUTOMATED: CPT

## 2018-08-09 PROCEDURE — 85007 BL SMEAR W/DIFF WBC COUNT: CPT

## 2018-08-09 PROCEDURE — 36415 COLL VENOUS BLD VENIPUNCTURE: CPT | Mod: PN

## 2018-08-09 PROCEDURE — 85027 COMPLETE CBC AUTOMATED: CPT | Mod: PN

## 2018-08-10 LAB — PATH REV BLD -IMP: NORMAL

## 2018-08-15 ENCOUNTER — TELEPHONE (OUTPATIENT)
Dept: OTOLARYNGOLOGY | Facility: CLINIC | Age: 78
End: 2018-08-15

## 2018-08-15 DIAGNOSIS — Z86.19 HISTORY OF CRYPTOCOCCAL MENINGITIS: Primary | ICD-10-CM

## 2018-08-15 NOTE — TELEPHONE ENCOUNTER
----- Message from Shannon Kaur sent at 8/15/2018 11:58 AM CDT -----  Contact: Patient  Type: Needs Medical Advice    Who Called: Patient  Symptoms (please be specific):  na  How long has patient had these symptoms:  johnathon  Pharmacy name and phone #:  [email or web address]  Best Call Back Number:   Additional Information: Calling to speak with the Nurse about a spinal tap. Please advise.

## 2018-08-15 NOTE — TELEPHONE ENCOUNTER
Spoke with patient who is wondering if he should go get spinal tap, as he has been forgetting and having bouts of dizziness, along with increased fatigue. He states the MD he saw in [place] some time ago told him that he was doing good, but that if he started having problems, the only way to tell anything, is with a spinal tap. Message forwarded to Dr Vides for review.

## 2018-08-20 ENCOUNTER — TELEPHONE (OUTPATIENT)
Dept: INFECTIOUS DISEASES | Facility: CLINIC | Age: 78
End: 2018-08-20

## 2018-08-20 NOTE — TELEPHONE ENCOUNTER
----- Message from Lorraine Vides MD sent at 8/20/2018 10:04 AM CDT -----  Results reviewed and negative. 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 Patient should follow up with his primary care physician for further evaluation.

## 2018-08-20 NOTE — TELEPHONE ENCOUNTER
"Spoke with patient and instructed per Dr Vides's note. Patient became upset and stated, I'm not going to see him(PCP) because he doesn't know what'sgoing on". He then said, The other doctor that I saw in [place] told me that the only way to tell if the Meningitis was gone was to get a Spinal Tap". This SN instructed that Dr Vides repeated the antigen prior to scheduling a LP to see if one was needed and the antigen is negative. Patient then asked," Can I get that in writing"? This note forwarded to Dr Vides for review.  "

## 2018-08-20 NOTE — TELEPHONE ENCOUNTER
Call placed to patient-cell phone currently out of service-Left message on home phone to return my call.

## 2018-09-18 ENCOUNTER — LAB VISIT (OUTPATIENT)
Dept: LAB | Facility: HOSPITAL | Age: 78
End: 2018-09-18
Attending: INTERNAL MEDICINE
Payer: MEDICARE

## 2018-09-18 DIAGNOSIS — S41.112A SKIN TEAR OF LEFT UPPER ARM WITHOUT COMPLICATION, INITIAL ENCOUNTER: ICD-10-CM

## 2018-09-18 LAB
ALBUMIN SERPL BCP-MCNC: 4.4 G/DL
ALP SERPL-CCNC: 67 U/L
ALT SERPL W/O P-5'-P-CCNC: 25 U/L
ANION GAP SERPL CALC-SCNC: 9 MMOL/L
AST SERPL-CCNC: 26 U/L
BASOPHILS # BLD AUTO: ABNORMAL K/UL
BASOPHILS NFR BLD: 0 %
BILIRUB SERPL-MCNC: 1.1 MG/DL
BUN SERPL-MCNC: 23 MG/DL
CALCIUM SERPL-MCNC: 9.4 MG/DL
CHLORIDE SERPL-SCNC: 104 MMOL/L
CO2 SERPL-SCNC: 26 MMOL/L
CREAT SERPL-MCNC: 1.12 MG/DL
DIFFERENTIAL METHOD: ABNORMAL
EOSINOPHIL # BLD AUTO: ABNORMAL K/UL
EOSINOPHIL NFR BLD: 0 %
ERYTHROCYTE [DISTWIDTH] IN BLOOD BY AUTOMATED COUNT: 14 %
EST. GFR  (AFRICAN AMERICAN): >60 ML/MIN/1.73 M^2
EST. GFR  (NON AFRICAN AMERICAN): >60 ML/MIN/1.73 M^2
GLUCOSE SERPL-MCNC: 125 MG/DL
HCT VFR BLD AUTO: 42.1 %
HGB BLD-MCNC: 13.5 G/DL
LDH SERPL L TO P-CCNC: 600 U/L
LYMPHOCYTES # BLD AUTO: ABNORMAL K/UL
LYMPHOCYTES NFR BLD: 66 %
MCH RBC QN AUTO: 30.4 PG
MCHC RBC AUTO-ENTMCNC: 32.1 G/DL
MCV RBC AUTO: 95 FL
MONOCYTES # BLD AUTO: ABNORMAL K/UL
MONOCYTES NFR BLD: 7 %
NEUTROPHILS # BLD AUTO: 3.1 K/UL
NEUTROPHILS NFR BLD: 26 %
NEUTS BAND NFR BLD MANUAL: 1 %
NRBC BLD-RTO: 0 /100 WBC
PLATELET # BLD AUTO: 90 K/UL
PLATELET BLD QL SMEAR: ABNORMAL
PMV BLD AUTO: 12 FL
POTASSIUM SERPL-SCNC: 4.2 MMOL/L
PROT SERPL-MCNC: 6.5 G/DL
RBC # BLD AUTO: 4.44 M/UL
SODIUM SERPL-SCNC: 139 MMOL/L
WBC # BLD AUTO: 11.47 K/UL

## 2018-09-18 PROCEDURE — 83615 LACTATE (LD) (LDH) ENZYME: CPT

## 2018-09-18 PROCEDURE — 80053 COMPREHEN METABOLIC PANEL: CPT

## 2018-09-18 PROCEDURE — 85027 COMPLETE CBC AUTOMATED: CPT | Mod: PN

## 2018-09-18 PROCEDURE — 80053 COMPREHEN METABOLIC PANEL: CPT | Mod: PN

## 2018-09-18 PROCEDURE — 85027 COMPLETE CBC AUTOMATED: CPT

## 2018-09-18 PROCEDURE — 36415 COLL VENOUS BLD VENIPUNCTURE: CPT | Mod: PN

## 2018-09-18 PROCEDURE — 85007 BL SMEAR W/DIFF WBC COUNT: CPT

## 2018-09-18 PROCEDURE — 83615 LACTATE (LD) (LDH) ENZYME: CPT | Mod: PN

## 2018-09-18 PROCEDURE — 85007 BL SMEAR W/DIFF WBC COUNT: CPT | Mod: PN

## 2018-09-24 ENCOUNTER — INFUSION (OUTPATIENT)
Dept: INFUSION THERAPY | Facility: HOSPITAL | Age: 78
End: 2018-09-24
Attending: INTERNAL MEDICINE
Payer: MEDICARE

## 2018-09-24 DIAGNOSIS — C91.10 LEUKEMIA, LYMPHOCYTIC, CHRONIC: Primary | ICD-10-CM

## 2018-09-24 PROCEDURE — A4216 STERILE WATER/SALINE, 10 ML: HCPCS | Mod: PN | Performed by: PHYSICIAN ASSISTANT

## 2018-09-24 PROCEDURE — 96523 IRRIG DRUG DELIVERY DEVICE: CPT | Mod: PN

## 2018-09-24 PROCEDURE — 25000003 PHARM REV CODE 250: Mod: PN | Performed by: PHYSICIAN ASSISTANT

## 2018-09-24 PROCEDURE — 63600175 PHARM REV CODE 636 W HCPCS: Mod: PN | Performed by: PHYSICIAN ASSISTANT

## 2018-09-24 RX ORDER — HEPARIN 100 UNIT/ML
500 SYRINGE INTRAVENOUS
Status: COMPLETED | OUTPATIENT
Start: 2018-09-24 | End: 2018-09-24

## 2018-09-24 RX ORDER — HEPARIN 100 UNIT/ML
500 SYRINGE INTRAVENOUS
Status: CANCELLED | OUTPATIENT
Start: 2018-09-24

## 2018-09-24 RX ORDER — SODIUM CHLORIDE 0.9 % (FLUSH) 0.9 %
10 SYRINGE (ML) INJECTION
Status: CANCELLED
Start: 2018-09-24

## 2018-09-24 RX ORDER — SODIUM CHLORIDE 0.9 % (FLUSH) 0.9 %
10 SYRINGE (ML) INJECTION
Status: DISCONTINUED | OUTPATIENT
Start: 2018-09-24 | End: 2018-09-24 | Stop reason: HOSPADM

## 2018-09-24 RX ADMIN — SODIUM CHLORIDE, PRESERVATIVE FREE 10 ML: 5 INJECTION INTRAVENOUS at 10:09

## 2018-09-24 RX ADMIN — HEPARIN 500 UNITS: 100 SYRINGE at 10:09

## 2018-10-08 ENCOUNTER — OFFICE VISIT (OUTPATIENT)
Dept: CARDIOLOGY | Facility: CLINIC | Age: 78
End: 2018-10-08
Payer: MEDICARE

## 2018-10-08 VITALS
WEIGHT: 175.69 LBS | HEART RATE: 66 BPM | SYSTOLIC BLOOD PRESSURE: 134 MMHG | DIASTOLIC BLOOD PRESSURE: 70 MMHG | HEIGHT: 69 IN | BODY MASS INDEX: 26.02 KG/M2

## 2018-10-08 DIAGNOSIS — I25.10 CORONARY ARTERY DISEASE INVOLVING NATIVE CORONARY ARTERY OF NATIVE HEART WITHOUT ANGINA PECTORIS: Primary | ICD-10-CM

## 2018-10-08 DIAGNOSIS — I10 ESSENTIAL HYPERTENSION: ICD-10-CM

## 2018-10-08 DIAGNOSIS — I49.49 PREMATURE BEATS: ICD-10-CM

## 2018-10-08 DIAGNOSIS — E78.00 HYPERCHOLESTEREMIA: ICD-10-CM

## 2018-10-08 DIAGNOSIS — I34.0 NON-RHEUMATIC MITRAL REGURGITATION: ICD-10-CM

## 2018-10-08 PROCEDURE — 99213 OFFICE O/P EST LOW 20 MIN: CPT | Mod: PBBFAC,PO | Performed by: INTERNAL MEDICINE

## 2018-10-08 PROCEDURE — 99999 PR PBB SHADOW E&M-EST. PATIENT-LVL III: CPT | Mod: PBBFAC,,, | Performed by: INTERNAL MEDICINE

## 2018-10-08 PROCEDURE — 99214 OFFICE O/P EST MOD 30 MIN: CPT | Mod: S$PBB,,, | Performed by: INTERNAL MEDICINE

## 2018-10-08 RX ORDER — PRAVASTATIN SODIUM 10 MG/1
TABLET ORAL
Qty: 90 TABLET | Refills: 1 | Status: ON HOLD | OUTPATIENT
Start: 2018-10-08 | End: 2019-01-15 | Stop reason: HOSPADM

## 2018-10-08 RX ORDER — AMLODIPINE BESYLATE 2.5 MG/1
2.5 TABLET ORAL DAILY
Qty: 90 TABLET | Refills: 1 | Status: SHIPPED | OUTPATIENT
Start: 2018-10-08 | End: 2019-01-18 | Stop reason: SDUPTHER

## 2018-10-08 RX ORDER — AMLODIPINE BESYLATE 2.5 MG/1
TABLET ORAL
Qty: 30 TABLET | Refills: 0 | Status: CANCELLED | OUTPATIENT
Start: 2018-10-08

## 2018-10-08 NOTE — PROGRESS NOTES
Subjective:    Patient ID:  Jan Kelly is a 78 y.o. male who presents for Coronary Artery Disease; Hypertension; and Valvular Heart Disease        HPI  RECENT LABS NOTED, CMP OK, FATIGUE WITH CHEMOTHERAPY, NO CP, NO SOB,RECENT BRAIN MRI ABN, TO HAVE PET SCAN,  SEE ROS    Past Medical History:   Diagnosis Date    Cancer     CLL    Cardiomyopathy     Chronic lymphocytic leukemia     Coronary artery disease     wih stents    Diabetes mellitus     Encounter for blood transfusion     Heart block     Heart murmur     Hyperlipidemia     Hypertension     Valvular regurgitation      Past Surgical History:   Procedure Laterality Date    CARDIAC CATHETERIZATION      CHOLECYSTECTOMY      COLONOSCOPY      COLONOSCOPY N/A 1/6/2017    Procedure: COLONOSCOPY;  Surgeon: Remy Pardo MD;  Location: [facility];  Service: Endoscopy;  Laterality: N/A;    COLONOSCOPY N/A 1/6/2017    Performed by [facility] at [facility] ENDO    CORONARY ANGIOPLASTY      CORONARY STENT PLACEMENT      DILATION-ESOPHAGUS N/A 1/6/2017    [facility] by [facility] at [facility] ENDO    ESOPHAGOGASTRODUODENOSCOPY (EGD) N/A 1/6/2017    Performed by Remy [facility]ardo [facility] at [facility] ENDO    evacuation of hematoma Right 05/18/2018    Right groin- Dr. ALINE Kelly, [facility]     EVACUATION-HEMATOMA Right 5/18/2018    Performed by Kumar Kelly MD at [facility] OR    EXCISION-LESION- Cord lypoma Right 5/8/2018    Performed by Kumar Kelly MD at [facility] OR    INGUINAL HERNIA REPAIR Right 05/08/2018    PORTACATH PLACEMENT      REPAIR-HERNIA-INGUINAL-INITIAL (5 YRS+) Right 5/8/2018    Performed by Kumar Kelly MD at [facility] OR    TONSILLECTOMY      VENOGRAM UPPER RIGHT EXTRMITY W/ ISELA IN CATH LAB N/A 5/17/2017    Performed by Ernesto Henry MD at [facility] CATH     Family History   Problem Relation Age of Onset    Heart disease Father     Hypertension Father      Social History     Socioeconomic History    Marital  status:      Spouse name: Not on file    Number of children: Not on file    Years of education: Not on file    Highest education level: Not on file   Social Needs    Financial resource strain: Not on file    Food insecurity - worry: Not on file    Food insecurity - inability: Not on file    Transportation needs - medical: Not on file    Transportation needs - non-medical: Not on file   Occupational History    Not on file   Tobacco Use    Smoking status: Former Smoker     Last attempt to quit: 1970     Years since quittin.8    Smokeless tobacco: Never Used   Substance and Sexual Activity    Alcohol use: No    Drug use: No    Sexual activity: Not on file   Other Topics Concern    Not on file   Social History Narrative    Not on file       Review of patient's allergies indicates:   Allergen Reactions    Penicillins     Titanium dioxide        Current Outpatient Medications:     amLODIPine (NORVASC) 2.5 MG tablet, Take 1 tablet (2.5 mg total) by mouth once daily., Disp: 90 tablet, Rfl: 1    aspirin (ECOTRIN) 81 MG EC tablet, Take 81 mg by mouth every morning. , Disp: , Rfl:     cyanocobalamin (VITAMIN B-12) 1000 MCG tablet, Take 1,000 mcg by mouth once daily. , Disp: , Rfl:     HYDROcodone-acetaminophen (NORCO)  mg per tablet, Take 1 tablet by mouth every 12 (twelve) hours as needed., Disp: 60 tablet, Rfl: 0    IMBRUVICA 140 mg Cap, Take 3 capsules by mouth once daily at 6am. (Patient taking differently: Take 1 capsule by mouth once daily at 6am. ), Disp: 90 capsule, Rfl: 6    IMBRUVICA 420 mg Tab, TAKE 1 TABLET (420MG) BY MOUTH ONE TIME DAILY WITH A FULL GLASS OF WATER., Disp: 28 tablet, Rfl: 6    insulin lispro protamin-lispro 100 unit/mL (50-50) InPn, Inject into the skin continuous prn., Disp: , Rfl:     metoprolol succinate (TOPROL-XL) 50 MG 24 hr tablet, TAKE 1 TABLET(50 MG) BY MOUTH EVERY DAY, Disp: 30 tablet, Rfl: 4    pantoprazole (PROTONIX) 40 MG tablet, TK 1 T PO D  30 MIN B JIL (Patient taking differently: Take 40 mg by mouth once daily. TK 1 T PO D 30 MIN B JIL), Disp: 90 tablet, Rfl: 3    pravastatin (PRAVACHOL) 10 MG tablet, TAKE 1 TABLET(10 MG) BY MOUTH EVERY EVENING, Disp: 90 tablet, Rfl: 1    ramipril (ALTACE) 5 MG capsule, Take 1 capsule (5 mg total) by mouth once daily. (Patient taking differently: Take 10 mg by mouth every evening. ), Disp: 90 capsule, Rfl: 3    ciprofloxacin-hydrocortisone 0.2-1% (CIPRO HC OTIC) otic suspension, Place 3 drops into the left ear 2 (two) times daily., Disp: 1 Bottle, Rfl: 0    metoprolol succinate (TOPROL-XL) 50 MG 24 hr tablet, Take 50 mg by mouth once daily., Disp: , Rfl:     mupirocin (BACTROBAN) 2 % ointment, Apply topically 2 (two) times daily., Disp: 15 g, Rfl: 0    Review of Systems   Constitution: Negative for chills, decreased appetite, diaphoresis, fever, weakness, malaise/fatigue (SOME) and night sweats. Weight loss: BETTER NOW.   HENT: Negative for congestion and nosebleeds.    Eyes: Negative for blurred vision, double vision and visual disturbance.   Cardiovascular: Negative for chest pain, claudication, cyanosis, dyspnea on exertion (MILD OCC), irregular heartbeat (OCC), leg swelling, near-syncope, orthopnea, palpitations, paroxysmal nocturnal dyspnea and syncope.   Respiratory: Negative for cough, hemoptysis, shortness of breath and wheezing.    Endocrine: Negative for cold intolerance and heat intolerance.   Hematologic/Lymphatic: Negative for adenopathy and bleeding problem. Does not bruise/bleed easily.   Skin: Negative for color change, itching and rash.   Musculoskeletal: Negative for back pain and falls. Arthritis: mild. Muscle cramps: IN AM.   Gastrointestinal: Negative for abdominal pain, change in bowel habit, dysphagia, heartburn, hematemesis, jaundice and melena.   Genitourinary: Negative for dysuria, flank pain and frequency.   Neurological: Negative for brief paralysis, dizziness (occ), focal weakness,  "light-headedness, loss of balance, numbness and tremors.   Psychiatric/Behavioral: Negative for altered mental status. The patient is not nervous/anxious.    Allergic/Immunologic: Negative for hives and persistent infections.        Objective:      Vitals:    10/08/18 1313   BP: 134/70   Pulse: 66   Weight: 79.7 kg (175 lb 11.3 oz)   Height: 5' 9" (1.753 m)   PainSc: 0-No pain     Body mass index is 25.95 kg/m².    Physical Exam   Constitutional: He is oriented to person, place, and time. He appears well-developed and well-nourished.   HENT:   Head: Normocephalic and atraumatic.   Mouth/Throat: Oropharynx is clear and moist.   Eyes: Conjunctivae and EOM are normal. Pupils are equal, round, and reactive to light.   Neck: Neck supple. Normal carotid pulses, no hepatojugular reflux and no JVD present. Carotid bruit is not present. No tracheal deviation present. No thyromegaly present.   Cardiovascular: Normal rate and regular rhythm. Exam reveals no gallop, no distant heart sounds, no friction rub and no midsystolic click.   Murmur heard.   Medium-pitched early systolic murmur is present with a grade of 1/6 at the lower left sternal border and apex.  Pulses:       Carotid pulses are 2+ on the right side, and 2+ on the left side.       Radial pulses are 2+ on the right side, and 2+ on the left side.        Femoral pulses are 2+ on the right side, and 2+ on the left side.       Dorsalis pedis pulses are 2+ on the right side, and 2+ on the left side.        Posterior tibial pulses are 2+ on the right side, and 2+ on the left side.   Pulmonary/Chest: Effort normal and breath sounds normal. No tachypnea and no bradypnea.   Abdominal: Soft. Bowel sounds are normal. He exhibits no distension and no mass. There is no tenderness.   Musculoskeletal: Normal range of motion. He exhibits no edema.   Lymphadenopathy:     He has no cervical adenopathy.     He has no axillary adenopathy.   Neurological: He is alert and oriented to " person, place, and time. No cranial nerve deficit.   Skin: Skin is warm and dry. No erythema.   Psychiatric: He has a normal mood and affect. His speech is normal and behavior is normal.               ..    Chemistry        Component Value Date/Time     09/18/2018 1031    K 4.2 09/18/2018 1031     09/18/2018 1031    CO2 26 09/18/2018 1031    BUN 23 (H) 09/18/2018 1031    CREATININE 1.12 09/18/2018 1031     (H) 09/18/2018 1031        Component Value Date/Time    CALCIUM 9.4 09/18/2018 1031    ALKPHOS 67 09/18/2018 1031    AST 26 09/18/2018 1031    AST 33 02/18/2016 1158    ALT 25 09/18/2018 1031    BILITOT 1.1 09/18/2018 1031    ESTGFRAFRICA >60 09/18/2018 1031    EGFRNONAA >60 09/18/2018 1031            ..  Lab Results   Component Value Date    CHOL 114 (L) 03/17/2017    CHOL 135 08/19/2016    CHOL 164 05/16/2016     Lab Results   Component Value Date    HDL 40 03/17/2017    HDL 32 (L) 08/19/2016    HDL 39 (L) 05/16/2016     Lab Results   Component Value Date    LDLCALC 65.8 03/17/2017    LDLCALC 85.4 08/19/2016    LDLCALC 108.0 05/16/2016     Lab Results   Component Value Date    TRIG 41 03/17/2017    TRIG 88 08/19/2016    TRIG 85 05/16/2016     Lab Results   Component Value Date    CHOLHDL 35.1 03/17/2017    CHOLHDL 23.7 08/19/2016    CHOLHDL 23.8 05/16/2016     ..  Lab Results   Component Value Date    WBC 11.47 09/18/2018    HGB 13.5 (L) 09/18/2018    HCT 42.1 09/18/2018    MCV 95 09/18/2018    PLT 90 (L) 09/18/2018       Test(s) Reviewed  I have reviewed the following in detail:  [] Stress test   [] Angiography   [] Echocardiogram   [x] Labs   [] Other:       Assessment:         ICD-10-CM ICD-9-CM   1. Coronary artery disease involving native coronary artery of native heart without angina pectoris I25.10 414.01   2. Essential hypertension I10 401.9   3. Non-rheumatic mitral regurgitation I34.0 424.0   4. Premature beats I49.49 427.60   5. Hypercholesteremia E78.00 272.0     Problem List Items  Addressed This Visit        Cardiac/Vascular    Essential hypertension    Coronary artery disease involving native coronary artery of native heart without angina pectoris - Primary    Non-rheumatic mitral regurgitation    Hypercholesteremia    Relevant Orders    Lipid panel    Premature beats           Plan:         ALL CV CLINICALLY STABLE, NO ANGINA, NO HF, NO TIA, NO CLINICAL ARRHYTHMIA,CONTINUE CURRENT MEDS, EDUCATION, DIET, EXERCISE, HAPPY B DAY, RTC IN 6 MO WITH LABS  Coronary artery disease involving native coronary artery of native heart without angina pectoris    Essential hypertension    Non-rheumatic mitral regurgitation    Premature beats    Hypercholesteremia  -     Lipid panel; Future; Expected date: 10/08/2018    Other orders  -     amLODIPine (NORVASC) 2.5 MG tablet; Take 1 tablet (2.5 mg total) by mouth once daily.  Dispense: 90 tablet; Refill: 1  -     pravastatin (PRAVACHOL) 10 MG tablet; TAKE 1 TABLET(10 MG) BY MOUTH EVERY EVENING  Dispense: 90 tablet; Refill: 1    RTC Low level/low impact aerobic exercise 5x's/wk. Heart healthy diet and risk factor modification.    See labs and med orders.    Aerobic exercise 5x's/wk. Heart healthy diet and risk factor modification.    See labs and med orders.

## 2018-10-11 RX ORDER — METOPROLOL SUCCINATE 50 MG/1
TABLET, EXTENDED RELEASE ORAL
Qty: 30 TABLET | Refills: 6 | Status: SHIPPED | OUTPATIENT
Start: 2018-10-11 | End: 2019-01-14

## 2018-11-14 RX ORDER — PRAVASTATIN SODIUM 10 MG/1
TABLET ORAL
Qty: 90 TABLET | Refills: 0 | Status: SHIPPED | OUTPATIENT
Start: 2018-11-14 | End: 2018-12-14 | Stop reason: SDUPTHER

## 2018-11-19 ENCOUNTER — INFUSION (OUTPATIENT)
Dept: INFUSION THERAPY | Facility: HOSPITAL | Age: 78
End: 2018-11-19
Attending: INTERNAL MEDICINE
Payer: MEDICARE

## 2018-11-19 DIAGNOSIS — C91.10 LEUKEMIA, LYMPHOCYTIC, CHRONIC: Primary | ICD-10-CM

## 2018-11-19 PROCEDURE — A4216 STERILE WATER/SALINE, 10 ML: HCPCS | Mod: PN | Performed by: PHYSICIAN ASSISTANT

## 2018-11-19 PROCEDURE — 25000003 PHARM REV CODE 250: Mod: PN | Performed by: PHYSICIAN ASSISTANT

## 2018-11-19 PROCEDURE — 96523 IRRIG DRUG DELIVERY DEVICE: CPT | Mod: PN

## 2018-11-19 PROCEDURE — 63600175 PHARM REV CODE 636 W HCPCS: Mod: PN | Performed by: PHYSICIAN ASSISTANT

## 2018-11-19 RX ORDER — HEPARIN 100 UNIT/ML
500 SYRINGE INTRAVENOUS
Status: COMPLETED | OUTPATIENT
Start: 2018-11-19 | End: 2018-11-19

## 2018-11-19 RX ORDER — SODIUM CHLORIDE 0.9 % (FLUSH) 0.9 %
10 SYRINGE (ML) INJECTION
Status: DISCONTINUED | OUTPATIENT
Start: 2018-11-19 | End: 2018-11-19 | Stop reason: HOSPADM

## 2018-11-19 RX ORDER — SODIUM CHLORIDE 0.9 % (FLUSH) 0.9 %
10 SYRINGE (ML) INJECTION
Status: CANCELLED
Start: 2018-11-19

## 2018-11-19 RX ORDER — HEPARIN 100 UNIT/ML
500 SYRINGE INTRAVENOUS
Status: CANCELLED | OUTPATIENT
Start: 2018-11-19

## 2018-11-19 RX ADMIN — Medication 10 ML: at 09:11

## 2018-11-19 RX ADMIN — HEPARIN 500 UNITS: 100 SYRINGE at 09:11

## 2018-12-05 PROBLEM — G03.9 MENINGITIS: Status: ACTIVE | Noted: 2018-12-05

## 2018-12-14 ENCOUNTER — OFFICE VISIT (OUTPATIENT)
Dept: CARDIOLOGY | Facility: CLINIC | Age: 78
End: 2018-12-14
Payer: MEDICARE

## 2018-12-14 VITALS
SYSTOLIC BLOOD PRESSURE: 138 MMHG | BODY MASS INDEX: 26.62 KG/M2 | HEIGHT: 69 IN | WEIGHT: 179.69 LBS | DIASTOLIC BLOOD PRESSURE: 73 MMHG | HEART RATE: 72 BPM

## 2018-12-14 DIAGNOSIS — I10 ESSENTIAL HYPERTENSION: ICD-10-CM

## 2018-12-14 DIAGNOSIS — I25.118 CORONARY ARTERY DISEASE OF NATIVE ARTERY OF NATIVE HEART WITH STABLE ANGINA PECTORIS: ICD-10-CM

## 2018-12-14 DIAGNOSIS — R06.02 SOB (SHORTNESS OF BREATH): Primary | ICD-10-CM

## 2018-12-14 DIAGNOSIS — I34.0 NON-RHEUMATIC MITRAL REGURGITATION: ICD-10-CM

## 2018-12-14 PROCEDURE — 99999 PR PBB SHADOW E&M-EST. PATIENT-LVL IV: CPT | Mod: PBBFAC,,, | Performed by: INTERNAL MEDICINE

## 2018-12-14 PROCEDURE — 99214 OFFICE O/P EST MOD 30 MIN: CPT | Mod: S$PBB,,, | Performed by: INTERNAL MEDICINE

## 2018-12-14 PROCEDURE — 99214 OFFICE O/P EST MOD 30 MIN: CPT | Mod: PBBFAC,PO | Performed by: INTERNAL MEDICINE

## 2018-12-14 RX ORDER — AMPICILLIN TRIHYDRATE 250 MG
CAPSULE ORAL 2 TIMES DAILY
COMMUNITY
End: 2019-12-09 | Stop reason: SDUPTHER

## 2018-12-14 RX ORDER — NITROGLYCERIN 0.4 MG/1
0.4 TABLET SUBLINGUAL EVERY 5 MIN PRN
Qty: 25 TABLET | Refills: 0 | Status: ON HOLD | OUTPATIENT
Start: 2018-12-14 | End: 2019-01-15 | Stop reason: SDUPTHER

## 2018-12-14 NOTE — PROGRESS NOTES
Subjective:    Patient ID:  Jan Kelly is a 78 y.o. male who presents for Irregular Heart Beat; Coronary Artery Disease; Shortness of Breath; Hypertension; and Hyperlipidemia        HPI  REQUESTED OV B/O SOB, SOME  CHEST TIGHTNESS. , CMP OK, NO LIPIDS, RECENT SPINAL TAP NEGATIVE, CULTURES PENDING, , SOME  SOB/ MCRAE,OCC CHEST HEAVINESS,  , SEE ROS    Past Medical History:   Diagnosis Date    Cancer     CLL    Cardiomyopathy     Chronic lymphocytic leukemia     Coronary artery disease     wih stents    Diabetes mellitus     Encounter for blood transfusion     Heart block     Heart murmur     Hyperlipidemia     Hypertension     Valvular regurgitation      Past Surgical History:   Procedure Laterality Date    CARDIAC CATHETERIZATION      CHOLECYSTECTOMY      COLONOSCOPY      COLONOSCOPY N/A 1/6/2017    Procedure: COLONOSCOPY;  Surgeon: Remy Pardo MD;  Location: [facility] ENDO;  Service: Endoscopy;  Laterality: N/A;    COLONOSCOPY N/A 1/6/2017    Performed by Remy Pardo MD at [facility] ENDO    CORONARY ANGIOPLASTY      CORONARY STENT PLACEMENT      DILATION-ESOPHAGUS N/A 1/6/2017    Performed by Remy Pardo MD at [facility] ENDO    ESOPHAGOGASTRODUODENOSCOPY (EGD) N/A 1/6/2017    Performed by Remy Pardo MD at [facility] ENDO    evacuation of hematoma Right 05/18/2018    Right groin- Dr. ALINE Kelly, [facility]     EVACUATION-HEMATOMA Right 5/18/2018    Performed by Kumar Kelly MD at [facility] OR    EXCISION-LESION- Cord lypoma Right 5/8/2018    Performed by Kumar Kelly MD at [facility] OR    INGUINAL HERNIA REPAIR Right 05/08/2018    NEEDLE LOCALIZATION N/A 12/5/2018    Procedure: NEEDLE LOCALIZATION lumbar puncture;  Surgeon: Gil Salazar MD;  Location: [facility] CATH;  Service: Interventional Radiology;  Laterality: N/A;    NEEDLE LOCALIZATION lumbar puncture N/A 12/5/2018    Performed by Gil Salazar MD at [facility] CATH    PORTACATH PLACEMENT      REPAIR-HERNIA-INGUINAL-INITIAL (5  YRS+) Right 2018    [facility] at [facility]    TONSILLECTOMY      VENOGRAM UPPER RIGHT EXTRMITY W/ ISELA IN CATH LAB N/A 2017    Performed by Ernesto [facility] at [facility]     Family History   Problem Relation Age of Onset    Heart disease Father     Hypertension Father      Social History     Socioeconomic History    Marital status:      Spouse name: None    Number of children: None    Years of education: None    Highest education level: None   Social Needs    Financial resource strain: None    Food insecurity - worry: None    Food insecurity - inability: None    Transportation needs - medical: None    Transportation needs - non-medical: None   Occupational History    None   Tobacco Use    Smoking status: Former Smoker     Last attempt to quit: 1970     Years since quittin.9    Smokeless tobacco: Never Used   Substance and Sexual Activity    Alcohol use: No    Drug use: No    Sexual activity: None   Other Topics Concern    None   Social History Narrative    None       Review of patient's allergies indicates:   Allergen Reactions    Penicillins     Titanium dioxide        Current Outpatient Medications:     amLODIPine (NORVASC) 2.5 MG tablet, Take 1 tablet (2.5 mg total) by mouth once daily., Disp: 90 tablet, Rfl: 1    aspirin (ECOTRIN) 81 MG EC tablet, Take 81 mg by mouth every morning. , Disp: , Rfl:     ciprofloxacin-hydrocortisone 0.2-1% (CIPRO HC OTIC) otic suspension, Place 3 drops into the left ear 2 (two) times daily., Disp: 1 Bottle, Rfl: 0    HYDROcodone-acetaminophen (NORCO)  mg per tablet, Take 1 tablet by mouth every 12 (twelve) hours as needed., Disp: 60 tablet, Rfl: 0    IMBRUVICA 140 mg Cap, Take 3 capsules by mouth once daily at 6am. (Patient taking differently: Take 1 capsule by mouth once daily at 6am. ), Disp: 90 capsule, Rfl: 6    IMBRUVICA 420 mg Tab, TAKE 1 TABLET (420MG) BY MOUTH ONE TIME DAILY WITH A  FULL GLASS OF WATER., Disp: 28 tablet, Rfl: 6    metoprolol succinate (TOPROL-XL) 50 MG 24 hr tablet, TAKE 1 TABLET(50 MG) BY MOUTH EVERY DAY, Disp: 30 tablet, Rfl: 6    pantoprazole (PROTONIX) 40 MG tablet, TK 1 T PO D 30 MIN B JIL (Patient taking differently: Take 40 mg by mouth once daily. TK 1 T PO D 30 MIN B JIL), Disp: 90 tablet, Rfl: 3    pravastatin (PRAVACHOL) 10 MG tablet, TAKE 1 TABLET(10 MG) BY MOUTH EVERY EVENING, Disp: 90 tablet, Rfl: 1    ramipril (ALTACE) 5 MG capsule, Take 1 capsule (5 mg total) by mouth once daily. (Patient taking differently: Take 10 mg by mouth every evening. ), Disp: 90 capsule, Rfl: 3    red yeast rice 600 mg Cap, Take by mouth 2 (two) times daily., Disp: , Rfl:     cyanocobalamin (VITAMIN B-12) 1000 MCG tablet, Take 1,000 mcg by mouth once daily. , Disp: , Rfl:     insulin lispro protamin-lispro 100 unit/mL (50-50) InPn, Inject into the skin continuous prn., Disp: , Rfl:     mupirocin (BACTROBAN) 2 % ointment, Apply topically 2 (two) times daily., Disp: 15 g, Rfl: 0    nitroGLYCERIN (NITROSTAT) 0.4 MG SL tablet, Place 1 tablet (0.4 mg total) under the tongue every 5 (five) minutes as needed., Disp: 25 tablet, Rfl: 0    Review of Systems   Constitution: Negative for chills, decreased appetite, diaphoresis, fever, weakness, malaise/fatigue (SOME) and night sweats. Weight loss: BETTER NOW.   HENT: Negative for congestion and nosebleeds.    Eyes: Negative for blurred vision and visual disturbance.   Cardiovascular: Positive for dyspnea on exertion (MILD , MOD). Negative for chest pain (SOME HEAVINESS), claudication, cyanosis, irregular heartbeat (OCC), leg swelling, near-syncope, orthopnea, palpitations, paroxysmal nocturnal dyspnea and syncope.   Respiratory: Negative for cough, hemoptysis, shortness of breath (SOME) and wheezing.    Endocrine: Negative for cold intolerance and heat intolerance.   Hematologic/Lymphatic: Negative for adenopathy and bleeding problem. Does  "not bruise/bleed easily.   Skin: Negative for color change, itching and rash.   Musculoskeletal: Negative for back pain and falls. Arthritis: mild. Muscle cramps: IN AM.   Gastrointestinal: Negative for abdominal pain, change in bowel habit, dysphagia, heartburn, hematemesis, jaundice and melena.   Genitourinary: Negative for dysuria, flank pain and frequency.   Neurological: Negative for brief paralysis, dizziness (occ), focal weakness, light-headedness, loss of balance, numbness and tremors.   Psychiatric/Behavioral: Negative for altered mental status. The patient is not nervous/anxious.    Allergic/Immunologic: Negative for hives and persistent infections.        Objective:      Vitals:    12/14/18 1131   BP: 138/73   Pulse: 72   Weight: 81.5 kg (179 lb 10.8 oz)   Height: 5' 9" (1.753 m)   PainSc: 0-No pain     Body mass index is 26.53 kg/m².    Physical Exam   Constitutional: He is oriented to person, place, and time. He appears well-developed and well-nourished.   HENT:   Head: Normocephalic and atraumatic.   Mouth/Throat: Oropharynx is clear and moist.   Eyes: Conjunctivae and EOM are normal. Pupils are equal, round, and reactive to light.   Neck: Neck supple. Normal carotid pulses, no hepatojugular reflux and no JVD present. Carotid bruit is not present. No tracheal deviation present. No thyromegaly present.   Cardiovascular: Normal rate and regular rhythm. Exam reveals no gallop, no distant heart sounds, no friction rub and no midsystolic click.   Murmur heard.   Medium-pitched early systolic murmur is present with a grade of 1/6 at the lower left sternal border and apex.  Pulses:       Carotid pulses are 2+ on the right side, and 2+ on the left side.       Radial pulses are 2+ on the right side, and 2+ on the left side.        Femoral pulses are 2+ on the right side, and 2+ on the left side.       Dorsalis pedis pulses are 2+ on the right side, and 2+ on the left side.        Posterior tibial pulses are 2+ " on the right side, and 2+ on the left side.   Pulmonary/Chest: Effort normal and breath sounds normal. No tachypnea and no bradypnea.   Abdominal: Soft. Bowel sounds are normal. He exhibits no distension and no mass. There is no tenderness.   Musculoskeletal: Normal range of motion. He exhibits no edema.   Lymphadenopathy:     He has no cervical adenopathy.     He has no axillary adenopathy.   Neurological: He is alert and oriented to person, place, and time. No cranial nerve deficit.   Skin: Skin is warm and dry. No erythema.   Psychiatric: He has a normal mood and affect. His speech is normal and behavior is normal.               ..    Chemistry        Component Value Date/Time     09/18/2018 1031    K 4.2 09/18/2018 1031     09/18/2018 1031    CO2 26 09/18/2018 1031    BUN 23 (H) 09/18/2018 1031    CREATININE 1.12 09/18/2018 1031     (H) 09/18/2018 1031        Component Value Date/Time    CALCIUM 9.4 09/18/2018 1031    ALKPHOS 67 09/18/2018 1031    AST 26 09/18/2018 1031    AST 33 02/18/2016 1158    ALT 25 09/18/2018 1031    BILITOT 1.1 09/18/2018 1031    ESTGFRAFRICA >60 09/18/2018 1031    EGFRNONAA >60 09/18/2018 1031            ..  Lab Results   Component Value Date    CHOL 114 (L) 03/17/2017    CHOL 135 08/19/2016    CHOL 164 05/16/2016     Lab Results   Component Value Date    HDL 40 03/17/2017    HDL 32 (L) 08/19/2016    HDL 39 (L) 05/16/2016     Lab Results   Component Value Date    LDLCALC 65.8 03/17/2017    LDLCALC 85.4 08/19/2016    LDLCALC 108.0 05/16/2016     Lab Results   Component Value Date    TRIG 41 03/17/2017    TRIG 88 08/19/2016    TRIG 85 05/16/2016     Lab Results   Component Value Date    CHOLHDL 35.1 03/17/2017    CHOLHDL 23.7 08/19/2016    CHOLHDL 23.8 05/16/2016     ..  Lab Results   Component Value Date    WBC 7.17 12/05/2018    HGB 13.9 (L) 12/05/2018    HCT 42.2 12/05/2018    MCV 92 12/05/2018    PLT 95 (L) 12/05/2018       Test(s) Reviewed  I have reviewed the  following in detail:  [] Stress test   [] Angiography   [] Echocardiogram   [] Labs   [] Other:       Assessment:         ICD-10-CM ICD-9-CM   1. SOB (shortness of breath) R06.02 786.05   2. Coronary artery disease of native artery of native heart with stable angina pectoris I25.118 414.01     413.9   3. Essential hypertension I10 401.9   4. Non-rheumatic mitral regurgitation I34.0 424.0     Problem List Items Addressed This Visit        Cardiac/Vascular    Essential hypertension    Coronary artery disease of native artery of native heart with stable angina pectoris    Relevant Orders    Stress test with myocardial perfusion (Cupid Only)    Non-rheumatic mitral regurgitation       Other    SOB (shortness of breath) - Primary    Relevant Orders    Stress test with myocardial perfusion (Cupid Only)           Plan:     PRN SL NTG, CHECK NUCLEAR STRESS, POSS CAD PROGRESSION, ALL CV CLINICALLY STABLE,  NO HF, NO TIA, NO CLINICAL ARRHYTHMIA,CONTINUE CURRENT MEDS, EDUCATION, DIET, EXERCISE      SOB (shortness of breath)  -     Stress test with myocardial perfusion (Cupid Only); Future    Coronary artery disease of native artery of native heart with stable angina pectoris  -     Stress test with myocardial perfusion (Cupid Only); Future    Essential hypertension    Non-rheumatic mitral regurgitation    Other orders  -     nitroGLYCERIN (NITROSTAT) 0.4 MG SL tablet; Place 1 tablet (0.4 mg total) under the tongue every 5 (five) minutes as needed.  Dispense: 25 tablet; Refill: 0    RTC Low level/low impact aerobic exercise 5x's/wk. Heart healthy diet and risk factor modification.    See labs and med orders.    Aerobic exercise 5x's/wk. Heart healthy diet and risk factor modification.    See labs and med orders.

## 2018-12-27 ENCOUNTER — TELEPHONE (OUTPATIENT)
Dept: CARDIOLOGY | Facility: CLINIC | Age: 78
End: 2018-12-27

## 2018-12-27 NOTE — TELEPHONE ENCOUNTER
----- Message from Jac Mendosa sent at 12/27/2018  9:32 AM CST -----  Type: Needs Medical Advice    Who Called:  Patient    Best Call Back Number:  [phone]  Additional Information: Caller states that he needs to reschedule his 3 stress tests scheduled on 12/27.

## 2019-01-14 PROBLEM — R07.9 CHEST PAIN, MODERATE CORONARY ARTERY RISK: Status: ACTIVE | Noted: 2019-01-14

## 2019-01-15 PROBLEM — R07.9 CHEST PAIN: Status: ACTIVE | Noted: 2019-01-15

## 2019-01-16 ENCOUNTER — TELEPHONE (OUTPATIENT)
Dept: CARDIOLOGY | Facility: CLINIC | Age: 79
End: 2019-01-16

## 2019-01-16 PROBLEM — I50.20 SYSTOLIC HEART FAILURE: Status: ACTIVE | Noted: 2019-01-16

## 2019-01-16 NOTE — TELEPHONE ENCOUNTER
----- Message from Nallely White sent at 1/16/2019 10:32 AM CST -----  Type:  Sooner Apoointment Request    Caller is requesting a sooner appointment.  Caller declined first available appointment listed below.  Caller will not accept being placed on the waitlist and is requesting a message be sent to doctor.    Name of Caller: Summer- [facility]  When is the first available appointment?  3/18/19  Symptoms:  Hospital discharge fu/[facility]  Best Call Back Number:  [phone] (home)     Additional Information:  Na

## 2019-01-18 RX ORDER — AMLODIPINE BESYLATE 2.5 MG/1
2.5 TABLET ORAL DAILY
Qty: 90 TABLET | Refills: 1 | Status: SHIPPED | OUTPATIENT
Start: 2019-01-18 | End: 2019-07-11 | Stop reason: SDUPTHER

## 2019-01-18 NOTE — TELEPHONE ENCOUNTER
----- Message from Sindi Gao sent at 1/18/2019 10:52 AM CST -----  Pt was in the hospital and his prescription for amLODIPine (NORVASC) 2.5 MG tablet was lost (had just recently had this filled) ... Requesting a new prescription be sent ... Call pt at [phone]   [facility] [address] [place] [address] AT [place] [address] [address] [place] [address]  [id number] [place] [address]  Phone: [phone] Fax: [phone]

## 2019-01-21 ENCOUNTER — TELEPHONE (OUTPATIENT)
Dept: CARDIOLOGY | Facility: CLINIC | Age: 79
End: 2019-01-21

## 2019-01-21 NOTE — TELEPHONE ENCOUNTER
----- Message from Yajaira Ureña sent at 1/21/2019  2:05 PM CST -----  Contact: Self  Type:  Sooner Apoointment Request    Caller is requesting a sooner appointment.  Caller declined first available appointment listed below.  Caller will not accept being placed on the waitlist and is requesting a message be sent to doctor.    Name of Caller: self  When is the first available appointment?  March  Symptoms:  2 wk f/u angiogram around 1/31  Best Call Back Number: [phone]  Additional Information:  na

## 2019-01-22 ENCOUNTER — TELEPHONE (OUTPATIENT)
Dept: CARDIOLOGY | Facility: CLINIC | Age: 79
End: 2019-01-22

## 2019-01-22 NOTE — TELEPHONE ENCOUNTER
Advised patient that medication has been called into [facility] pharmacy. Scheduled patient hospital f/u for 2/15/19 at 11:45am. Patient verbalized understanding & is aware of appt time and date.

## 2019-01-22 NOTE — TELEPHONE ENCOUNTER
----- Message from Kimberly Beach sent at 1/22/2019  1:25 PM CST -----  Contact: self  Patient [phone] is requesting a prescription for his anxiety/please advise       [employer] [address] [facility] [address] [place] [address] AT [facility] [address] [place] [address]  [address] [address] [address]  Phone: [phone] Fax: [phone]

## 2019-02-14 DIAGNOSIS — I10 ESSENTIAL HYPERTENSION: ICD-10-CM

## 2019-02-14 DIAGNOSIS — I50.22 CHRONIC SYSTOLIC HEART FAILURE: Primary | ICD-10-CM

## 2019-02-14 RX ORDER — ATORVASTATIN CALCIUM 20 MG/1
20 TABLET, FILM COATED ORAL NIGHTLY
Qty: 30 TABLET | Refills: 0 | Status: CANCELLED | OUTPATIENT
Start: 2019-02-14 | End: 2020-02-14

## 2019-02-14 RX ORDER — METOPROLOL SUCCINATE 50 MG/1
50 TABLET, EXTENDED RELEASE ORAL DAILY
Qty: 30 TABLET | Refills: 0 | Status: CANCELLED | OUTPATIENT
Start: 2019-02-14

## 2019-02-14 RX ORDER — FUROSEMIDE 20 MG/1
20 TABLET ORAL DAILY
Qty: 30 TABLET | Refills: 0 | Status: CANCELLED | OUTPATIENT
Start: 2019-02-14 | End: 2020-02-14

## 2019-02-14 NOTE — TELEPHONE ENCOUNTER
----- Message from Nallely White sent at 2/14/2019  8:52 AM CST -----  Type:  RX Refill Request    Who Called:  Patient  Refill or New Rx:  refill  RX Name and Strength: ticagrelor (BRILINTA) 90 mg tablet  furosemide (LASIX) 20 MG tablet  atorvastatin (LIPITOR) 20 MG tablet  metoprolol succinate (TOPROL-XL) 50 MG 24 hr tablet  How is the patient currently taking it? (ex. 1XDay):  Na  Is this a 30 day or 90 day RX:  90  Preferred Pharmacy with phone number:    [facility] [address] [facility] [address] [place] [address] AT [facility] [address] [place] [address]  [address] [place] [address]  Phone: [phone] Fax: [phone]  Local or Mail Order: local  Ordering Provider:  Chyna  Best Call Back Number:  [phone]    Additional Information:  Stating need a refill of these medications, has an appt tomorrow with Dr. Clemente but need filled today. Please advise

## 2019-02-15 ENCOUNTER — OFFICE VISIT (OUTPATIENT)
Dept: CARDIOLOGY | Facility: CLINIC | Age: 79
End: 2019-02-15
Payer: MEDICARE

## 2019-02-15 VITALS
WEIGHT: 171.31 LBS | HEART RATE: 69 BPM | SYSTOLIC BLOOD PRESSURE: 132 MMHG | DIASTOLIC BLOOD PRESSURE: 68 MMHG | HEIGHT: 69 IN | BODY MASS INDEX: 25.37 KG/M2

## 2019-02-15 DIAGNOSIS — I44.7 LBBB (LEFT BUNDLE BRANCH BLOCK): ICD-10-CM

## 2019-02-15 DIAGNOSIS — I10 ESSENTIAL HYPERTENSION: ICD-10-CM

## 2019-02-15 DIAGNOSIS — D69.6 THROMBOCYTOPENIA: ICD-10-CM

## 2019-02-15 DIAGNOSIS — I25.5 ISCHEMIC CARDIOMYOPATHY: Primary | ICD-10-CM

## 2019-02-15 DIAGNOSIS — I49.49 PREMATURE BEATS: ICD-10-CM

## 2019-02-15 DIAGNOSIS — E78.00 HYPERCHOLESTEROLEMIA: ICD-10-CM

## 2019-02-15 DIAGNOSIS — Z79.02 LONG TERM (CURRENT) USE OF ANTITHROMBOTICS/ANTIPLATELETS: ICD-10-CM

## 2019-02-15 PROCEDURE — 93005 ELECTROCARDIOGRAM TRACING: CPT | Mod: PBBFAC,PO | Performed by: INTERNAL MEDICINE

## 2019-02-15 PROCEDURE — 99214 OFFICE O/P EST MOD 30 MIN: CPT | Mod: S$PBB,,, | Performed by: INTERNAL MEDICINE

## 2019-02-15 PROCEDURE — 99214 PR OFFICE/OUTPT VISIT, EST, LEVL IV, 30-39 MIN: ICD-10-PCS | Mod: S$PBB,,, | Performed by: INTERNAL MEDICINE

## 2019-02-15 PROCEDURE — 93010 ELECTROCARDIOGRAM REPORT: CPT | Mod: S$PBB,,, | Performed by: INTERNAL MEDICINE

## 2019-02-15 PROCEDURE — 93010 EKG 12-LEAD: ICD-10-PCS | Mod: S$PBB,,, | Performed by: INTERNAL MEDICINE

## 2019-02-15 PROCEDURE — 99999 PR PBB SHADOW E&M-EST. PATIENT-LVL III: ICD-10-PCS | Mod: PBBFAC,,, | Performed by: INTERNAL MEDICINE

## 2019-02-15 PROCEDURE — 99999 PR PBB SHADOW E&M-EST. PATIENT-LVL III: CPT | Mod: PBBFAC,,, | Performed by: INTERNAL MEDICINE

## 2019-02-15 PROCEDURE — 99213 OFFICE O/P EST LOW 20 MIN: CPT | Mod: PBBFAC,PO,25 | Performed by: INTERNAL MEDICINE

## 2019-02-15 RX ORDER — LORAZEPAM 0.5 MG/1
TABLET ORAL
Refills: 0 | COMMUNITY
Start: 2019-01-22 | End: 2020-01-01 | Stop reason: ALTCHOICE

## 2019-02-15 RX ORDER — FUROSEMIDE 20 MG/1
20 TABLET ORAL DAILY
Qty: 30 TABLET | Refills: 5 | Status: SHIPPED | OUTPATIENT
Start: 2019-02-15 | End: 2019-08-09 | Stop reason: SDUPTHER

## 2019-02-15 RX ORDER — ATORVASTATIN CALCIUM 20 MG/1
20 TABLET, FILM COATED ORAL NIGHTLY
Qty: 30 TABLET | Refills: 5 | Status: SHIPPED | OUTPATIENT
Start: 2019-02-15 | End: 2019-08-14 | Stop reason: SDUPTHER

## 2019-02-15 RX ORDER — METOPROLOL SUCCINATE 50 MG/1
50 TABLET, EXTENDED RELEASE ORAL DAILY
Qty: 30 TABLET | Refills: 5 | Status: SHIPPED | OUTPATIENT
Start: 2019-02-15 | End: 2019-06-17 | Stop reason: SDUPTHER

## 2019-02-15 NOTE — PROGRESS NOTES
Subjective:    Patient ID:  Jan Kelly is a 78 y.o. male who presents for Coronary Artery Disease (Stent ); Hypertension; Hyperlipidemia; and Valvular Heart Disease        HPI  S/P STPH WITH CP, SOB, HAD PCI/ RA OF LAD, ABN FFR, REVIEWED ANGIOGRAM AND RECORDS, EF IN 40'S , DOING OK, PLT 84,OVERALL FEELS BETTER,  ,LBBB,SEE ROS    Past Medical History:   Diagnosis Date    Cancer     CLL    Cardiomyopathy     Chronic lymphocytic leukemia     Coronary artery disease     wih stents    Diabetes mellitus     Encounter for blood transfusion     Heart block     Heart murmur     Hyperlipidemia     Hypertension     Valvular regurgitation      Past Surgical History:   Procedure Laterality Date    ANGIOGRAM, CORONARY ARTERY N/A 1/17/2019    Performed by [facility]hi Clemente [facility] at [facility] CATH    CARDIAC CATHETERIZATION      CHOLECYSTECTOMY      COLONOSCOPY      COLONOSCOPY N/A 1/6/2017    Performed by Remy Pardo MD at [facility] ENDO    CORONARY ANGIOPLASTY      CORONARY STENT PLACEMENT      DILATION-ESOPHAGUS N/A 1/6/2017    Performed by Remy Pardo [facility] at [facility] ENDO    ESOPHAGOGASTRODUODENOSCOPY (EGD) N/A 1/6/2017    Performed by Remy Pardo [facility] at [facility] ENDO    evacuation of hematoma Right 05/18/2018    Right groin- Dr. ALINE Kelly, [facility]     EVACUATION-HEMATOMA Right 5/18/2018    Performed by Kumar Kelly MD at [facility] OR    EXCISION-LESION- Cord lypoma Right 5/8/2018    Performed by Kumar Kelly MD at [facility] OR    Fractional Flow [place] (FFR), Coronary  1/17/2019    Performed by Sukhi Clemente [facility] at [facility] CATH    INGUINAL HERNIA REPAIR Right 05/08/2018    Left heart cath- RM # [id number] B Right 1/17/2019    Performed by S[facility]hi Clemente [facility] at [facility] CATH    NEEDLE LOCALIZATION lumbar puncture N/A 12/5/2018    Performed by Gil Salazar MD at [facility] CATH    Percutaneous coronary intervention N/A 1/17/2019    Performed by [facility]hi [facility]e [facility] CATH    PORTACATH PLACEMENT       REPAIR-HERNIA-INGUINAL-INITIAL (5 YRS+) Right 2018    Performed by Kumar Kelly MD at [facility] OR    TONSILLECTOMY      VENOGRAM UPPER RIGHT EXTRMITY W/ ISELA IN CATH LAB N/A 2017    Performed by [facility]esto [facility] at [facility]     Family History   Problem Relation Age of Onset    Heart disease Father     Hypertension Father      Social History     Socioeconomic History    Marital status:      Spouse name: Not on file    Number of children: Not on file    Years of education: Not on file    Highest education level: Not on file   Social Needs    Financial resource strain: Not on file    Food insecurity - worry: Not on file    Food insecurity - inability: Not on file    Transportation needs - medical: Not on file    Transportation needs - non-medical: Not on file   Occupational History    Not on file   Tobacco Use    Smoking status: Former Smoker     Last attempt to quit: 1970     Years since quittin.1    Smokeless tobacco: Never Used   Substance and Sexual Activity    Alcohol use: No    Drug use: No    Sexual activity: No   Other Topics Concern    Not on file   Social History Narrative    Not on file       Review of patient's allergies indicates:   Allergen Reactions    Penicillins     Titanium dioxide        Current Outpatient Medications:     amLODIPine (NORVASC) 2.5 MG tablet, Take 1 tablet (2.5 mg total) by mouth once daily., Disp: 90 tablet, Rfl: 1    aspirin (ECOTRIN) 81 MG EC tablet, Take 81 mg by mouth every morning. , Disp: , Rfl:     atorvastatin (LIPITOR) 20 MG tablet, Take 1 tablet (20 mg total) by mouth every evening., Disp: 30 tablet, Rfl: 5    ciprofloxacin-hydrocortisone 0.2-1% (CIPRO HC OTIC) otic suspension, Place 3 drops into the left ear 2 (two) times daily., Disp: 1 Bottle, Rfl: 0    furosemide (LASIX) 20 MG tablet, Take 1 tablet (20 mg total) by mouth once daily., Disp: 30 tablet, Rfl: 5    HYDROcodone-acetaminophen (NORCO)  mg  per tablet, Take 1 tablet by mouth every 12 (twelve) hours as needed., Disp: 60 tablet, Rfl: 0    IMBRUVICA 420 mg Tab, TAKE 1 TABLET (420MG) BY MOUTH ONE TIME DAILY WITH A FULL GLASS OF WATER., Disp: 28 tablet, Rfl: 6    insulin lispro protamin-lispro 100 unit/mL (50-50) InPn, Inject into the skin continuous prn., Disp: , Rfl:     LORazepam (ATIVAN) 0.5 MG tablet, TK 1 T PO QPM PRN, Disp: , Rfl: 0    metoprolol succinate (TOPROL-XL) 50 MG 24 hr tablet, Take 1 tablet (50 mg total) by mouth once daily., Disp: 30 tablet, Rfl: 5    mupirocin (BACTROBAN) 2 % ointment, Apply topically 2 (two) times daily., Disp: 15 g, Rfl: 0    nitroGLYCERIN (NITROSTAT) 0.4 MG SL tablet, Place 1 tablet (0.4 mg total) under the tongue every 5 (five) minutes as needed., Disp: 25 tablet, Rfl: 0    pantoprazole (PROTONIX) 40 MG tablet, TK 1 T PO D 30 MIN B JIL (Patient taking differently: Take 40 mg by mouth once daily. TK 1 T PO D 30 MIN B JIL), Disp: 90 tablet, Rfl: 3    ramipril (ALTACE) 5 MG capsule, Take 1 capsule (5 mg total) by mouth once daily. (Patient taking differently: Take 10 mg by mouth every evening. ), Disp: 90 capsule, Rfl: 3    ticagrelor (BRILINTA) 90 mg tablet, Take 1 tablet (90 mg total) by mouth 2 (two) times daily., Disp: 60 tablet, Rfl: 5    cyanocobalamin (VITAMIN B-12) 1000 MCG tablet, Take 1,000 mcg by mouth once daily. , Disp: , Rfl:     red yeast rice 600 mg Cap, Take by mouth 2 (two) times daily., Disp: , Rfl:     Review of Systems   Constitution: Negative for chills, decreased appetite, diaphoresis, fever, weakness, malaise/fatigue (SOME) and night sweats. Weight loss: BETTER NOW.   HENT: Negative for congestion and nosebleeds.    Eyes: Negative for blurred vision and visual disturbance.   Cardiovascular: Negative for chest pain, claudication, cyanosis, dyspnea on exertion (MILD ,), irregular heartbeat (OCC), leg swelling, near-syncope, orthopnea, palpitations, paroxysmal nocturnal dyspnea and  "syncope.   Respiratory: Negative for cough, hemoptysis, shortness of breath and wheezing.    Endocrine: Negative for cold intolerance and heat intolerance.   Hematologic/Lymphatic: Negative for adenopathy and bleeding problem. Does not bruise/bleed easily (SOME).   Skin: Negative for color change, itching and rash.   Musculoskeletal: Negative for back pain and falls. Arthritis: mild.   Gastrointestinal: Negative for abdominal pain, change in bowel habit, dysphagia, heartburn, hematemesis, jaundice and melena.   Genitourinary: Negative for dysuria, flank pain and frequency.   Neurological: Negative for brief paralysis, dizziness (occ), focal weakness, light-headedness, loss of balance and numbness.   Psychiatric/Behavioral: Negative for altered mental status. The patient is not nervous/anxious (SOME, STRESS AT HOME).    Allergic/Immunologic: Negative for hives and persistent infections.        Objective:      Vitals:    02/15/19 1121   BP: 132/68   Pulse: 69   Weight: 77.7 kg (171 lb 4.8 oz)   Height: 5' 9" (1.753 m)   PainSc: 0-No pain     Body mass index is 25.3 kg/m².    Physical Exam   Constitutional: He is oriented to person, place, and time. He appears well-developed and well-nourished.   HENT:   Head: Normocephalic and atraumatic.   Mouth/Throat: Oropharynx is clear and moist.   Eyes: Conjunctivae and EOM are normal. Pupils are equal, round, and reactive to light.   Neck: Neck supple. Normal carotid pulses, no hepatojugular reflux and no JVD present. Carotid bruit is not present. No thyromegaly present.   Cardiovascular: Normal rate and regular rhythm. Exam reveals no gallop, no distant heart sounds, no friction rub and no midsystolic click.   Murmur heard.   Medium-pitched early systolic murmur is present with a grade of 1/6 at the lower left sternal border and apex.  Pulses:       Carotid pulses are 2+ on the right side, and 2+ on the left side.       Radial pulses are 2+ on the right side, and 2+ on the " left side.        Femoral pulses are 2+ on the right side, and 2+ on the left side.       Dorsalis pedis pulses are 2+ on the right side, and 2+ on the left side.        Posterior tibial pulses are 2+ on the right side, and 2+ on the left side.   Pulmonary/Chest: Effort normal and breath sounds normal. No tachypnea and no bradypnea.   Abdominal: Soft. Bowel sounds are normal. He exhibits no distension and no mass.   Musculoskeletal: Normal range of motion. He exhibits no edema.   Lymphadenopathy:     He has no cervical adenopathy.     He has no axillary adenopathy.   Neurological: He is alert and oriented to person, place, and time. No cranial nerve deficit.   Skin: Skin is warm and dry. No erythema.   Psychiatric: He has a normal mood and affect. His speech is normal and behavior is normal.               ..    Chemistry        Component Value Date/Time     02/07/2019 1034    K 3.9 02/07/2019 1034     02/07/2019 1034    CO2 29 02/07/2019 1034    BUN 21 02/07/2019 1034    CREATININE 1.17 02/07/2019 1034     (H) 02/07/2019 1034        Component Value Date/Time    CALCIUM 9.3 02/07/2019 1034    ALKPHOS 76 02/07/2019 1034    AST 27 02/07/2019 1034    AST 33 02/18/2016 1158    ALT 28 02/07/2019 1034    BILITOT 1.3 02/07/2019 1034    ESTGFRAFRICA >60 02/07/2019 1034    EGFRNONAA 59 (A) 02/07/2019 1034            ..  Lab Results   Component Value Date    CHOL 93 (L) 01/15/2019    CHOL 114 (L) 03/17/2017    CHOL 135 08/19/2016     Lab Results   Component Value Date    HDL 37 (L) 01/15/2019    HDL 40 03/17/2017    HDL 32 (L) 08/19/2016     Lab Results   Component Value Date    LDLCALC 42.4 (L) 01/15/2019    LDLCALC 65.8 03/17/2017    LDLCALC 85.4 08/19/2016     Lab Results   Component Value Date    TRIG 68 01/15/2019    TRIG 41 03/17/2017    TRIG 88 08/19/2016     Lab Results   Component Value Date    CHOLHDL 39.8 01/15/2019    CHOLHDL 35.1 03/17/2017    CHOLHDL 23.7 08/19/2016     ..  Lab Results    Component Value Date    WBC 5.62 02/07/2019    HGB 14.2 02/07/2019    HCT 43.3 02/07/2019    MCV 91 02/07/2019    PLT 84 (L) 02/07/2019       Test(s) Reviewed  I have reviewed the following in detail:  [] Stress test   [] Angiography   [] Echocardiogram   [] Labs   [] Other:       Assessment:         ICD-10-CM ICD-9-CM   1. Ischemic cardiomyopathy I25.5 414.8   2. Long term (current) use of antithrombotics/antiplatelets Z79.02 V58.63   3. LBBB (left bundle branch block) I44.7 426.3   4. Essential hypertension I10 401.9   5. Premature beats I49.49 427.60   6. Hypercholesterolemia E78.00 272.0   7. Thrombocytopenia D69.6 287.5     Problem List Items Addressed This Visit        Cardiac/Vascular    Essential hypertension    Relevant Orders    CBC auto differential    Hypercholesterolemia    Relevant Orders    CBC auto differential    Premature beats    Relevant Orders    CBC auto differential    LBBB (left bundle branch block)    Relevant Orders    CBC auto differential       Hematology    Long term (current) use of antithrombotics/antiplatelets    Relevant Orders    CBC auto differential       Other    SOB (shortness of breath) - Primary      Other Visit Diagnoses     Thrombocytopenia        Relevant Orders    CBC auto differential           Plan:         EKG SR, LAD, LBBB, ALL CV CLINICALLY STABLE, NO ANGINA, NO HF, NO TIA, NO CLINICAL ARRHYTHMIA,CONTINUE CURRENT MEDS, EDUCATION, DIET, EXERCISE, RTC IN 4 MO WITH LABS  Ischemic cardiomyopathy  -     SCHEDULED EKG 12-LEAD (to Muse); Future; Expected date: 08/15/2019  -     CBC auto differential; Future; Expected date: 02/15/2019    Long term (current) use of antithrombotics/antiplatelets  -     CBC auto differential; Future; Expected date: 02/15/2019    LBBB (left bundle branch block)  -     CBC auto differential; Future; Expected date: 02/15/2019    Essential hypertension  -     CBC auto differential; Future; Expected date: 02/15/2019    Premature beats  -     CBC  auto differential; Future; Expected date: 02/15/2019    Hypercholesterolemia  -     CBC auto differential; Future; Expected date: 02/15/2019    Thrombocytopenia  -     CBC auto differential; Future; Expected date: 02/15/2019    Other orders  -     metoprolol succinate (TOPROL-XL) 50 MG 24 hr tablet; Take 1 tablet (50 mg total) by mouth once daily.  Dispense: 30 tablet; Refill: 5  -     ticagrelor (BRILINTA) 90 mg tablet; Take 1 tablet (90 mg total) by mouth 2 (two) times daily.  Dispense: 60 tablet; Refill: 5  -     furosemide (LASIX) 20 MG tablet; Take 1 tablet (20 mg total) by mouth once daily.  Dispense: 30 tablet; Refill: 5  -     atorvastatin (LIPITOR) 20 MG tablet; Take 1 tablet (20 mg total) by mouth every evening.  Dispense: 30 tablet; Refill: 5    RTC Low level/low impact aerobic exercise 5x's/wk. Heart healthy diet and risk factor modification.    See labs and med orders.    Aerobic exercise 5x's/wk. Heart healthy diet and risk factor modification.    See labs and med orders.

## 2019-02-18 RX ORDER — PRAVASTATIN SODIUM 10 MG/1
TABLET ORAL
Qty: 90 TABLET | Refills: 0 | OUTPATIENT
Start: 2019-02-18

## 2019-03-06 RX ORDER — PANTOPRAZOLE SODIUM 40 MG/1
TABLET, DELAYED RELEASE ORAL
Qty: 90 TABLET | Refills: 3 | Status: SHIPPED | OUTPATIENT
Start: 2019-03-06 | End: 2020-03-17 | Stop reason: SDUPTHER

## 2019-04-04 RX ORDER — HEPARIN 100 UNIT/ML
500 SYRINGE INTRAVENOUS
Status: CANCELLED | OUTPATIENT
Start: 2019-04-04

## 2019-04-04 RX ORDER — SODIUM CHLORIDE 0.9 % (FLUSH) 0.9 %
10 SYRINGE (ML) INJECTION
Status: CANCELLED
Start: 2019-04-04

## 2019-04-08 ENCOUNTER — INFUSION (OUTPATIENT)
Dept: INFUSION THERAPY | Facility: HOSPITAL | Age: 79
End: 2019-04-08
Attending: INTERNAL MEDICINE
Payer: MEDICARE

## 2019-04-08 DIAGNOSIS — C91.10 LEUKEMIA, LYMPHOCYTIC, CHRONIC: Primary | ICD-10-CM

## 2019-04-08 PROCEDURE — 63600175 PHARM REV CODE 636 W HCPCS: Mod: PN | Performed by: INTERNAL MEDICINE

## 2019-04-08 PROCEDURE — 25000003 PHARM REV CODE 250: Mod: PN | Performed by: INTERNAL MEDICINE

## 2019-04-08 PROCEDURE — A4216 STERILE WATER/SALINE, 10 ML: HCPCS | Mod: PN | Performed by: INTERNAL MEDICINE

## 2019-04-08 PROCEDURE — 96523 IRRIG DRUG DELIVERY DEVICE: CPT | Mod: PN

## 2019-04-08 RX ORDER — SODIUM CHLORIDE 0.9 % (FLUSH) 0.9 %
10 SYRINGE (ML) INJECTION
Status: CANCELLED
Start: 2019-04-08

## 2019-04-08 RX ORDER — HEPARIN 100 UNIT/ML
500 SYRINGE INTRAVENOUS
Status: COMPLETED | OUTPATIENT
Start: 2019-04-08 | End: 2019-04-08

## 2019-04-08 RX ORDER — HEPARIN 100 UNIT/ML
500 SYRINGE INTRAVENOUS
Status: CANCELLED | OUTPATIENT
Start: 2019-04-08

## 2019-04-08 RX ORDER — SODIUM CHLORIDE 0.9 % (FLUSH) 0.9 %
10 SYRINGE (ML) INJECTION
Status: DISCONTINUED | OUTPATIENT
Start: 2019-04-08 | End: 2019-04-08 | Stop reason: HOSPADM

## 2019-04-08 RX ADMIN — Medication 10 ML: at 10:04

## 2019-04-08 RX ADMIN — HEPARIN 500 UNITS: 100 SYRINGE at 10:04

## 2019-04-11 RX ORDER — AMLODIPINE BESYLATE 2.5 MG/1
TABLET ORAL
Qty: 90 TABLET | Refills: 0 | Status: SHIPPED | OUTPATIENT
Start: 2019-04-11 | End: 2019-05-01 | Stop reason: SDUPTHER

## 2019-05-01 PROBLEM — G89.29 CHRONIC LOW BACK PAIN: Status: ACTIVE | Noted: 2019-05-01

## 2019-05-01 PROBLEM — M54.50 CHRONIC LOW BACK PAIN: Status: ACTIVE | Noted: 2019-05-01

## 2019-05-01 PROBLEM — D69.2 SENILE PURPURA: Status: ACTIVE | Noted: 2019-05-01

## 2019-06-03 ENCOUNTER — INFUSION (OUTPATIENT)
Dept: INFUSION THERAPY | Facility: HOSPITAL | Age: 79
End: 2019-06-03
Attending: INTERNAL MEDICINE
Payer: MEDICARE

## 2019-06-03 DIAGNOSIS — C91.10 LEUKEMIA, LYMPHOCYTIC, CHRONIC: Primary | ICD-10-CM

## 2019-06-03 PROCEDURE — 25000003 PHARM REV CODE 250: Mod: PN | Performed by: INTERNAL MEDICINE

## 2019-06-03 PROCEDURE — A4216 STERILE WATER/SALINE, 10 ML: HCPCS | Mod: PN | Performed by: INTERNAL MEDICINE

## 2019-06-03 PROCEDURE — 96523 IRRIG DRUG DELIVERY DEVICE: CPT | Mod: PN

## 2019-06-03 PROCEDURE — 63600175 PHARM REV CODE 636 W HCPCS: Mod: PN | Performed by: INTERNAL MEDICINE

## 2019-06-03 RX ORDER — HEPARIN 100 UNIT/ML
500 SYRINGE INTRAVENOUS
Status: COMPLETED | OUTPATIENT
Start: 2019-06-03 | End: 2019-06-03

## 2019-06-03 RX ORDER — HEPARIN 100 UNIT/ML
500 SYRINGE INTRAVENOUS
Status: CANCELLED | OUTPATIENT
Start: 2019-06-03

## 2019-06-03 RX ORDER — SODIUM CHLORIDE 0.9 % (FLUSH) 0.9 %
10 SYRINGE (ML) INJECTION
Status: DISCONTINUED | OUTPATIENT
Start: 2019-06-03 | End: 2019-06-03 | Stop reason: HOSPADM

## 2019-06-03 RX ORDER — SODIUM CHLORIDE 0.9 % (FLUSH) 0.9 %
10 SYRINGE (ML) INJECTION
Status: CANCELLED
Start: 2019-06-03

## 2019-06-03 RX ADMIN — Medication 10 ML: at 11:06

## 2019-06-03 RX ADMIN — HEPARIN SODIUM (PORCINE) LOCK FLUSH IV SOLN 100 UNIT/ML 500 UNITS: 100 SOLUTION at 11:06

## 2019-06-17 ENCOUNTER — OFFICE VISIT (OUTPATIENT)
Dept: CARDIOLOGY | Facility: CLINIC | Age: 79
End: 2019-06-17
Payer: MEDICARE

## 2019-06-17 VITALS
SYSTOLIC BLOOD PRESSURE: 115 MMHG | WEIGHT: 175.69 LBS | BODY MASS INDEX: 26.02 KG/M2 | HEIGHT: 69 IN | HEART RATE: 60 BPM | DIASTOLIC BLOOD PRESSURE: 57 MMHG

## 2019-06-17 DIAGNOSIS — I25.10 CORONARY ARTERY DISEASE INVOLVING NATIVE CORONARY ARTERY OF NATIVE HEART WITHOUT ANGINA PECTORIS: Primary | ICD-10-CM

## 2019-06-17 DIAGNOSIS — I10 ESSENTIAL HYPERTENSION: ICD-10-CM

## 2019-06-17 DIAGNOSIS — I34.0 NON-RHEUMATIC MITRAL REGURGITATION: ICD-10-CM

## 2019-06-17 DIAGNOSIS — Z79.02 LONG TERM (CURRENT) USE OF ANTITHROMBOTICS/ANTIPLATELETS: ICD-10-CM

## 2019-06-17 PROCEDURE — 99214 PR OFFICE/OUTPT VISIT, EST, LEVL IV, 30-39 MIN: ICD-10-PCS | Mod: S$PBB,,, | Performed by: INTERNAL MEDICINE

## 2019-06-17 PROCEDURE — 99214 OFFICE O/P EST MOD 30 MIN: CPT | Mod: PBBFAC,PO | Performed by: INTERNAL MEDICINE

## 2019-06-17 PROCEDURE — 99214 OFFICE O/P EST MOD 30 MIN: CPT | Mod: S$PBB,,, | Performed by: INTERNAL MEDICINE

## 2019-06-17 PROCEDURE — 99999 PR PBB SHADOW E&M-EST. PATIENT-LVL IV: ICD-10-PCS | Mod: PBBFAC,,, | Performed by: INTERNAL MEDICINE

## 2019-06-17 PROCEDURE — 99999 PR PBB SHADOW E&M-EST. PATIENT-LVL IV: CPT | Mod: PBBFAC,,, | Performed by: INTERNAL MEDICINE

## 2019-06-17 RX ORDER — METOPROLOL SUCCINATE 50 MG/1
50 TABLET, EXTENDED RELEASE ORAL DAILY
Qty: 30 TABLET | Refills: 5 | Status: SHIPPED | OUTPATIENT
Start: 2019-06-17 | End: 2020-02-11

## 2019-06-17 NOTE — PROGRESS NOTES
Subjective:    Patient ID:  Jan Kelly is a 78 y.o. male who presents for Coronary Artery Disease (labs); Valvular Heart Disease; Hypertension; and Hyperlipidemia        HPI  DISCUSSED LABS AND GOALS, CMP OK, HBA1C  5.5%, DOING OK, NO CP, LESS SOB WITH NASAL SPRAY--- PER PULMONARY, NO SIGNIFICANT PALPITATION, SEE ROS    Past Medical History:   Diagnosis Date    Cancer     CLL    Cardiomyopathy     Chronic lymphocytic leukemia     Coronary artery disease     wih stents    Diabetes mellitus     Encounter for blood transfusion     Heart block     Heart murmur     Hyperlipidemia     Hypertension     Valvular regurgitation      Past Surgical History:   Procedure Laterality Date    ANGIOGRAM, CORONARY ARTERY N/A 1/17/2019    Performed by [facility]hi Clemente [facility] at [facility] CATH    CARDIAC CATHETERIZATION      CHOLECYSTECTOMY      COLONOSCOPY      COLONOSCOPY N/A 1/6/2017    Performed by Remy Pardo MD at [facility] ENDO    CORONARY ANGIOPLASTY      CORONARY STENT PLACEMENT      DILATION-ESOPHAGUS N/A 1/6/2017    Performed by Remy Pardo [facility] at [facility] ENDO    ESOPHAGOGASTRODUODENOSCOPY (EGD) N/A 1/6/2017    Performed by Remy Pardo [facility] at [facility] ENDO    evacuation of hematoma Right 05/18/2018    Right groin- Dr. ALINE Kelly, [facility]     EVACUATION-HEMATOMA Right 5/18/2018    Performed by Kumar Kelly MD at [facility] OR    EXCISION-LESION- Cord lypoma Right 5/8/2018    Performed by Kumar Kelly MD at [facility] OR    Fractional Flow Leo (FFR), Coronary  1/17/2019    Performed by Sukhi Clemente [facility] at [facility] CATH    INGUINAL HERNIA REPAIR Right 05/08/2018    Left heart cath- RM # [id number] B Right 1/17/2019    Performed by S[facility]hi [facility]e [facility] at [facility] CATH    NEEDLE LOCALIZATION lumbar puncture N/A 12/5/2018    Performed by Gil Salazar MD at [facility] CATH    Percutaneous coronary intervention N/A 1/17/2019    Performed by [facility]hi [facility]e [facility] at [facility] CATH    PORTACATH PLACEMENT       REPAIR-HERNIA-INGUINAL-INITIAL (5 YRS+) Right 2018    [facility]    TONSILLECTOMY      VENOGRAM UPPER RIGHT EXTRMITY W/ ISELA IN CATH LAB N/A 2017    Per[facility] by [facility] Henry [facility] at [facility]     Family History   Problem Relation Age of Onset    Heart disease Father     Hypertension Father      Social History     Socioeconomic History    Marital status:      Spouse name: Not on file    Number of children: Not on file    Years of education: Not on file    Highest education level: Not on file   Occupational History    Not on file   Social Needs    Financial resource strain: Not on file    Food insecurity:     Worry: Not on file     Inability: Not on file    Transportation needs:     Medical: Not on file     Non-medical: Not on file   Tobacco Use    Smoking status: Former Smoker     Last attempt to quit: 1970     Years since quittin.4    Smokeless tobacco: Never Used   Substance and Sexual Activity    Alcohol use: No    Drug use: No    Sexual activity: Never   Lifestyle    Physical activity:     Days per week: Not on file     Minutes per session: Not on file    Stress: Not on file   Relationships    Social connections:     Talks on phone: Not on file     Gets together: Not on file     Attends [employer] service: Not on file     Active member of club or organization: Not on file     Attends meetings of clubs or organizations: Not on file     Relationship status: Not on file   Other Topics Concern    Not on file   Social History Narrative    Not on file       Review of patient's allergies indicates:   Allergen Reactions    Penicillins     Titanium dioxide        Current Outpatient Medications:     amLODIPine (NORVASC) 2.5 MG tablet, Take 1 tablet (2.5 mg total) by mouth once daily., Disp: 90 tablet, Rfl: 1    aspirin (ECOTRIN) 81 MG EC tablet, Take 81 mg by mouth every morning. , Disp: , Rfl:     atorvastatin (LIPITOR) 20 MG  tablet, Take 1 tablet (20 mg total) by mouth every evening., Disp: 30 tablet, Rfl: 5    ciprofloxacin-hydrocortisone 0.2-1% (CIPRO HC OTIC) otic suspension, Place 3 drops into the left ear 2 (two) times daily. (Patient taking differently: Place 3 drops into the left ear 2 (two) times daily. Pt reports he uses PRN), Disp: 1 Bottle, Rfl: 0    furosemide (LASIX) 20 MG tablet, Take 1 tablet (20 mg total) by mouth once daily., Disp: 30 tablet, Rfl: 5    HYDROcodone-acetaminophen (NORCO)  mg per tablet, Take 1 tablet by mouth every 6 (six) hours as needed., Disp: 30 tablet, Rfl: 0    ibrutinib (IMBRUVICA) 420 mg Tab, Take 1 tablet by mouth once daily., Disp: 28 tablet, Rfl: 11    metoprolol succinate (TOPROL-XL) 50 MG 24 hr tablet, Take 1 tablet (50 mg total) by mouth once daily., Disp: 30 tablet, Rfl: 5    pantoprazole (PROTONIX) 40 MG tablet, TK 1 T PO D 30 MIN B JIL, Disp: 90 tablet, Rfl: 3    ramipril (ALTACE) 5 MG capsule, TAKE 1 CAPSULE(5 MG) BY MOUTH EVERY DAY, Disp: 90 capsule, Rfl: 0    red yeast rice 600 mg Cap, Take by mouth 2 (two) times daily., Disp: , Rfl:     ticagrelor (BRILINTA) 90 mg tablet, Take 1 tablet (90 mg total) by mouth 2 (two) times daily., Disp: 60 tablet, Rfl: 5    TRELEGY ELLIPTA 100-62.5-25 mcg DsDv, Inhale 1 puff into the lungs once daily., Disp: , Rfl: 6    cyanocobalamin (VITAMIN B-12) 1000 MCG tablet, Take 1,000 mcg by mouth once daily. , Disp: , Rfl:     LORazepam (ATIVAN) 0.5 MG tablet, TK 1 T PO QPM PRN, Disp: , Rfl: 0    mupirocin (BACTROBAN) 2 % ointment, Apply topically 2 (two) times daily., Disp: 15 g, Rfl: 0    nitroGLYCERIN (NITROSTAT) 0.4 MG SL tablet, Place 1 tablet (0.4 mg total) under the tongue every 5 (five) minutes as needed., Disp: 25 tablet, Rfl: 0    Review of Systems   Constitution: Negative for chills, decreased appetite, diaphoresis, fever, malaise/fatigue (SOME) and night sweats. Weight loss: BETTER NOW.   HENT: Negative for congestion and  "nosebleeds.    Eyes: Negative for blurred vision and visual disturbance.   Cardiovascular: Negative for chest pain, claudication, cyanosis, dyspnea on exertion (MILD ,, BETTER WITH NASAL SPRAY), irregular heartbeat (OCC), leg swelling, near-syncope, orthopnea, palpitations, paroxysmal nocturnal dyspnea and syncope.   Respiratory: Negative for cough, hemoptysis, shortness of breath (BETTER WITH MEDS) and wheezing.    Endocrine: Negative for cold intolerance.   Hematologic/Lymphatic: Negative for adenopathy. Does not bruise/bleed easily (SOME).   Skin: Negative for color change, itching and rash.   Musculoskeletal: Negative for back pain and falls. Arthritis: mild.   Gastrointestinal: Negative for abdominal pain, change in bowel habit, dysphagia, heartburn, hematemesis, jaundice and melena.   Genitourinary: Negative for dysuria, flank pain and frequency.   Neurological: Negative for brief paralysis, dizziness (occ), focal weakness, light-headedness, loss of balance, numbness and weakness.   Psychiatric/Behavioral: Negative for altered mental status. Memory loss: SOME. The patient is not nervous/anxious (SOME, STRESS AT HOME).    Allergic/Immunologic: Negative for hives and persistent infections.        Objective:      Vitals:    06/17/19 1109   BP: (!) 115/57   Pulse: 60   Weight: 79.7 kg (175 lb 11.3 oz)   Height: 5' 9" (1.753 m)   PainSc: 0-No pain     Body mass index is 25.95 kg/m².    Physical Exam   Constitutional: He is oriented to person, place, and time. He appears well-developed and well-nourished.   HENT:   Head: Normocephalic and atraumatic.   Mouth/Throat: Oropharynx is clear and moist.   Eyes: Pupils are equal, round, and reactive to light. Conjunctivae and EOM are normal.   Neck: Neck supple. Normal carotid pulses, no hepatojugular reflux and no JVD present. Carotid bruit is not present. No thyromegaly present.   Cardiovascular: Normal rate and regular rhythm. Exam reveals no gallop, no distant heart " sounds, no friction rub and no midsystolic click.   Murmur heard.   Medium-pitched early systolic murmur is present with a grade of 1/6 at the lower left sternal border and apex.  Pulses:       Carotid pulses are 2+ on the right side, and 2+ on the left side.       Radial pulses are 2+ on the right side, and 2+ on the left side.        Femoral pulses are 2+ on the right side, and 2+ on the left side.       Dorsalis pedis pulses are 2+ on the right side, and 2+ on the left side.        Posterior tibial pulses are 2+ on the right side, and 2+ on the left side.   Pulmonary/Chest: Effort normal and breath sounds normal. No tachypnea and no bradypnea.   Abdominal: Soft. Bowel sounds are normal. He exhibits no distension and no mass.   Musculoskeletal: Normal range of motion. He exhibits no edema.   Lymphadenopathy:     He has no cervical adenopathy.     He has no axillary adenopathy.   Neurological: He is alert and oriented to person, place, and time. No cranial nerve deficit.   Skin: Skin is warm and dry. No erythema.   Psychiatric: He has a normal mood and affect. His speech is normal and behavior is normal.               ..    Chemistry        Component Value Date/Time     05/31/2019 1028     05/31/2019 1028    K 3.9 05/31/2019 1028    K 3.9 05/31/2019 1028     05/31/2019 1028     05/31/2019 1028    CO2 26 05/31/2019 1028    CO2 26 05/31/2019 1028    BUN 19 05/31/2019 1028    BUN 19 05/31/2019 1028    CREATININE 1.35 05/31/2019 1028    CREATININE 1.35 05/31/2019 1028     (H) 05/31/2019 1028     (H) 05/31/2019 1028        Component Value Date/Time    CALCIUM 9.3 05/31/2019 1028    CALCIUM 9.3 05/31/2019 1028    ALKPHOS 77 05/31/2019 1028    ALKPHOS 77 05/31/2019 1028    AST 29 05/31/2019 1028    AST 29 05/31/2019 1028    AST 33 02/18/2016 1158    ALT 26 05/31/2019 1028    ALT 26 05/31/2019 1028    BILITOT 1.4 (H) 05/31/2019 1028    BILITOT 1.4 (H) 05/31/2019 1028    ESTGFRAFRICA 57  (A) 05/31/2019 1028    ESTGFRAFRICA 57 (A) 05/31/2019 1028    EGFRNONAA 49 (A) 05/31/2019 1028    EGFRNONAA 49 (A) 05/31/2019 1028            ..  Lab Results   Component Value Date    CHOL 93 (L) 01/15/2019    CHOL 114 (L) 03/17/2017    CHOL 135 08/19/2016     Lab Results   Component Value Date    HDL 37 (L) 01/15/2019    HDL 40 03/17/2017    HDL 32 (L) 08/19/2016     Lab Results   Component Value Date    LDLCALC 42.4 (L) 01/15/2019    LDLCALC 65.8 03/17/2017    LDLCALC 85.4 08/19/2016     Lab Results   Component Value Date    TRIG 68 01/15/2019    TRIG 41 03/17/2017    TRIG 88 08/19/2016     Lab Results   Component Value Date    CHOLHDL 39.8 01/15/2019    CHOLHDL 35.1 03/17/2017    CHOLHDL 23.7 08/19/2016     ..  Lab Results   Component Value Date    WBC 4.61 05/31/2019    HGB 12.3 (L) 05/31/2019    HCT 36.9 (L) 05/31/2019    MCV 93 05/31/2019    PLT 81 (L) 05/31/2019       Test(s) Reviewed  I have reviewed the following in detail:  [] Stress test   [] Angiography   [] Echocardiogram   [x] Labs   [] Other:       Assessment:         ICD-10-CM ICD-9-CM   1. Coronary artery disease involving native coronary artery of native heart without angina pectoris I25.10 414.01   2. Essential hypertension I10 401.9   3. Non-rheumatic mitral regurgitation I34.0 424.0   4. Long term (current) use of antithrombotics/antiplatelets Z79.02 V58.63     Problem List Items Addressed This Visit        Cardiac/Vascular    Essential hypertension    Coronary artery disease - Primary    Non-rheumatic mitral regurgitation       Hematology    Long term (current) use of antithrombotics/antiplatelets           Plan:         DECREASE ASPIRIN TO EVERY OTHER DAY AND GIVE IN VIEW OF SIGNIFICANT BRUISING, CONTINUE BRILINTA, ALL CV CLINICALLY STABLE, NO ANGINA, NO HF, NO TIA, NO SIGNIFICANT CLINICAL ARRHYTHMIA,CONTINUE CURRENT MEDS, EDUCATION, DIET, EXERCISE, RTC IN 6 MO WITH LABS  Coronary artery disease involving native coronary artery of native heart  without angina pectoris    Essential hypertension    Non-rheumatic mitral regurgitation    Long term (current) use of antithrombotics/antiplatelets    Other orders  -     metoprolol succinate (TOPROL-XL) 50 MG 24 hr tablet; Take 1 tablet (50 mg total) by mouth once daily.  Dispense: 30 tablet; Refill: 5    RTC Low level/low impact aerobic exercise 5x's/wk. Heart healthy diet and risk factor modification.    See labs and med orders.    Aerobic exercise 5x's/wk. Heart healthy diet and risk factor modification.    See labs and med orders.

## 2019-07-11 RX ORDER — AMLODIPINE BESYLATE 2.5 MG/1
TABLET ORAL
Qty: 90 TABLET | Refills: 1 | Status: SHIPPED | OUTPATIENT
Start: 2019-07-11 | End: 2020-01-10

## 2019-07-29 ENCOUNTER — INFUSION (OUTPATIENT)
Dept: INFUSION THERAPY | Facility: HOSPITAL | Age: 79
End: 2019-07-29
Attending: INTERNAL MEDICINE
Payer: MEDICARE

## 2019-07-29 DIAGNOSIS — C91.10 LEUKEMIA, LYMPHOCYTIC, CHRONIC: Primary | ICD-10-CM

## 2019-07-29 PROCEDURE — 96523 IRRIG DRUG DELIVERY DEVICE: CPT | Mod: PN

## 2019-07-29 PROCEDURE — 63600175 PHARM REV CODE 636 W HCPCS: Mod: PN | Performed by: INTERNAL MEDICINE

## 2019-07-29 PROCEDURE — A4216 STERILE WATER/SALINE, 10 ML: HCPCS | Mod: PN | Performed by: INTERNAL MEDICINE

## 2019-07-29 PROCEDURE — 25000003 PHARM REV CODE 250: Mod: PN | Performed by: INTERNAL MEDICINE

## 2019-07-29 RX ORDER — HEPARIN 100 UNIT/ML
500 SYRINGE INTRAVENOUS
Status: COMPLETED | OUTPATIENT
Start: 2019-07-29 | End: 2019-07-29

## 2019-07-29 RX ORDER — HEPARIN 100 UNIT/ML
500 SYRINGE INTRAVENOUS
Status: CANCELLED | OUTPATIENT
Start: 2019-07-29

## 2019-07-29 RX ORDER — SODIUM CHLORIDE 0.9 % (FLUSH) 0.9 %
10 SYRINGE (ML) INJECTION
Status: DISCONTINUED | OUTPATIENT
Start: 2019-07-29 | End: 2019-07-29 | Stop reason: HOSPADM

## 2019-07-29 RX ORDER — SODIUM CHLORIDE 0.9 % (FLUSH) 0.9 %
10 SYRINGE (ML) INJECTION
Status: CANCELLED
Start: 2019-07-29

## 2019-07-29 RX ADMIN — HEPARIN 500 UNITS: 100 SYRINGE at 10:07

## 2019-07-29 RX ADMIN — Medication 10 ML: at 10:07

## 2019-08-08 RX ORDER — TICAGRELOR 90 MG/1
TABLET ORAL
Qty: 60 TABLET | Refills: 3 | Status: SHIPPED | OUTPATIENT
Start: 2019-08-08 | End: 2019-12-14 | Stop reason: SDUPTHER

## 2019-08-09 RX ORDER — FUROSEMIDE 20 MG/1
TABLET ORAL
Qty: 30 TABLET | Refills: 3 | Status: SHIPPED | OUTPATIENT
Start: 2019-08-09 | End: 2020-01-15 | Stop reason: SDUPTHER

## 2019-08-15 RX ORDER — ATORVASTATIN CALCIUM 20 MG/1
TABLET, FILM COATED ORAL
Qty: 30 TABLET | Refills: 3 | Status: SHIPPED | OUTPATIENT
Start: 2019-08-15 | End: 2019-12-09 | Stop reason: SDUPTHER

## 2019-09-24 ENCOUNTER — INFUSION (OUTPATIENT)
Dept: INFUSION THERAPY | Facility: HOSPITAL | Age: 79
End: 2019-09-24
Attending: INTERNAL MEDICINE
Payer: MEDICARE

## 2019-09-24 DIAGNOSIS — C91.10 LEUKEMIA, LYMPHOCYTIC, CHRONIC: Primary | ICD-10-CM

## 2019-09-24 PROCEDURE — A4216 STERILE WATER/SALINE, 10 ML: HCPCS | Mod: PN | Performed by: INTERNAL MEDICINE

## 2019-09-24 PROCEDURE — 63600175 PHARM REV CODE 636 W HCPCS: Mod: PN | Performed by: INTERNAL MEDICINE

## 2019-09-24 PROCEDURE — 25000003 PHARM REV CODE 250: Mod: PN | Performed by: INTERNAL MEDICINE

## 2019-09-24 PROCEDURE — 96523 IRRIG DRUG DELIVERY DEVICE: CPT | Mod: PN

## 2019-09-24 RX ORDER — HEPARIN 100 UNIT/ML
500 SYRINGE INTRAVENOUS
Status: COMPLETED | OUTPATIENT
Start: 2019-09-24 | End: 2019-09-24

## 2019-09-24 RX ORDER — HEPARIN 100 UNIT/ML
500 SYRINGE INTRAVENOUS
Status: CANCELLED | OUTPATIENT
Start: 2019-09-24

## 2019-09-24 RX ORDER — SODIUM CHLORIDE 0.9 % (FLUSH) 0.9 %
10 SYRINGE (ML) INJECTION
Status: CANCELLED
Start: 2019-09-24

## 2019-09-24 RX ORDER — SODIUM CHLORIDE 0.9 % (FLUSH) 0.9 %
10 SYRINGE (ML) INJECTION
Status: DISCONTINUED | OUTPATIENT
Start: 2019-09-24 | End: 2019-09-24 | Stop reason: HOSPADM

## 2019-09-24 RX ADMIN — Medication 10 ML: at 12:09

## 2019-09-24 RX ADMIN — HEPARIN SODIUM (PORCINE) LOCK FLUSH IV SOLN 100 UNIT/ML 500 UNITS: 100 SOLUTION at 12:09

## 2019-11-18 ENCOUNTER — INFUSION (OUTPATIENT)
Dept: INFUSION THERAPY | Facility: HOSPITAL | Age: 79
End: 2019-11-18
Attending: INTERNAL MEDICINE
Payer: MEDICARE

## 2019-11-18 DIAGNOSIS — C91.10 LEUKEMIA, LYMPHOCYTIC, CHRONIC: Primary | ICD-10-CM

## 2019-11-18 PROCEDURE — 25000003 PHARM REV CODE 250: Mod: PN | Performed by: INTERNAL MEDICINE

## 2019-11-18 PROCEDURE — A4216 STERILE WATER/SALINE, 10 ML: HCPCS | Mod: PN | Performed by: INTERNAL MEDICINE

## 2019-11-18 PROCEDURE — 63600175 PHARM REV CODE 636 W HCPCS: Mod: PN | Performed by: INTERNAL MEDICINE

## 2019-11-18 PROCEDURE — 96523 IRRIG DRUG DELIVERY DEVICE: CPT | Mod: PN

## 2019-11-18 RX ORDER — HEPARIN 100 UNIT/ML
500 SYRINGE INTRAVENOUS
Status: COMPLETED | OUTPATIENT
Start: 2019-11-18 | End: 2019-11-18

## 2019-11-18 RX ORDER — SODIUM CHLORIDE 0.9 % (FLUSH) 0.9 %
10 SYRINGE (ML) INJECTION
Status: DISCONTINUED | OUTPATIENT
Start: 2019-11-18 | End: 2019-11-18 | Stop reason: HOSPADM

## 2019-11-18 RX ORDER — HEPARIN 100 UNIT/ML
500 SYRINGE INTRAVENOUS
Status: CANCELLED | OUTPATIENT
Start: 2019-11-18

## 2019-11-18 RX ORDER — SODIUM CHLORIDE 0.9 % (FLUSH) 0.9 %
10 SYRINGE (ML) INJECTION
Status: CANCELLED
Start: 2019-11-18

## 2019-11-18 RX ADMIN — Medication 10 ML: at 11:11

## 2019-11-18 RX ADMIN — HEPARIN 500 UNITS: 100 SYRINGE at 11:11

## 2019-12-09 ENCOUNTER — OFFICE VISIT (OUTPATIENT)
Dept: CARDIOLOGY | Facility: CLINIC | Age: 79
End: 2019-12-09
Payer: MEDICARE

## 2019-12-09 VITALS
DIASTOLIC BLOOD PRESSURE: 66 MMHG | WEIGHT: 172.81 LBS | HEIGHT: 69 IN | BODY MASS INDEX: 25.6 KG/M2 | SYSTOLIC BLOOD PRESSURE: 124 MMHG | HEART RATE: 65 BPM

## 2019-12-09 DIAGNOSIS — I25.118 CORONARY ARTERY DISEASE OF NATIVE ARTERY OF NATIVE HEART WITH STABLE ANGINA PECTORIS: ICD-10-CM

## 2019-12-09 DIAGNOSIS — I50.22 CHRONIC SYSTOLIC HEART FAILURE: Primary | ICD-10-CM

## 2019-12-09 DIAGNOSIS — I10 ESSENTIAL HYPERTENSION: ICD-10-CM

## 2019-12-09 DIAGNOSIS — E78.00 HYPERCHOLESTEROLEMIA: ICD-10-CM

## 2019-12-09 DIAGNOSIS — Z79.02 LONG TERM (CURRENT) USE OF ANTITHROMBOTICS/ANTIPLATELETS: ICD-10-CM

## 2019-12-09 PROCEDURE — 99214 PR OFFICE/OUTPT VISIT, EST, LEVL IV, 30-39 MIN: ICD-10-PCS | Mod: S$PBB,,, | Performed by: INTERNAL MEDICINE

## 2019-12-09 PROCEDURE — 1159F MED LIST DOCD IN RCRD: CPT | Mod: ,,, | Performed by: INTERNAL MEDICINE

## 2019-12-09 PROCEDURE — 1125F PR PAIN SEVERITY QUANTIFIED, PAIN PRESENT: ICD-10-PCS | Mod: ,,, | Performed by: INTERNAL MEDICINE

## 2019-12-09 PROCEDURE — 1159F PR MEDICATION LIST DOCUMENTED IN MEDICAL RECORD: ICD-10-PCS | Mod: ,,, | Performed by: INTERNAL MEDICINE

## 2019-12-09 PROCEDURE — 99999 PR PBB SHADOW E&M-EST. PATIENT-LVL III: CPT | Mod: PBBFAC,,, | Performed by: INTERNAL MEDICINE

## 2019-12-09 PROCEDURE — 99213 OFFICE O/P EST LOW 20 MIN: CPT | Mod: PBBFAC,PO | Performed by: INTERNAL MEDICINE

## 2019-12-09 PROCEDURE — 1125F AMNT PAIN NOTED PAIN PRSNT: CPT | Mod: ,,, | Performed by: INTERNAL MEDICINE

## 2019-12-09 PROCEDURE — 99999 PR PBB SHADOW E&M-EST. PATIENT-LVL III: ICD-10-PCS | Mod: PBBFAC,,, | Performed by: INTERNAL MEDICINE

## 2019-12-09 PROCEDURE — 99214 OFFICE O/P EST MOD 30 MIN: CPT | Mod: S$PBB,,, | Performed by: INTERNAL MEDICINE

## 2019-12-09 RX ORDER — ATORVASTATIN CALCIUM 20 MG/1
TABLET, FILM COATED ORAL
Qty: 90 TABLET | Refills: 1 | Status: SHIPPED | OUTPATIENT
Start: 2019-12-09 | End: 2020-03-17 | Stop reason: SDUPTHER

## 2019-12-09 NOTE — PROGRESS NOTES
Subjective:    Patient ID:  Jan Kelly is a 79 y.o. male who presents for Coronary Artery Disease; Hypertension; and Hyperlipidemia        HPI  RECENT CMP OK, PCP DECREASED LASIX,  TO QOD, B/O DIZZ, BETTER,BP OK, NO CHEST PAIN PER SE, MILD DISTANT EXERTION NO TIA TYPE SYMPTOMS NO NEAR-SYNCOPE, SEE ROS    Past Medical History:   Diagnosis Date    Cancer     CLL    Cardiomyopathy     Chronic lymphocytic leukemia     Coronary artery disease     wih stents    Diabetes mellitus     Encounter for blood transfusion     Heart block     Heart murmur     Hyperlipidemia     Hypertension     Valvular regurgitation      Past Surgical History:   Procedure Laterality Date    CARDIAC CATHETERIZATION      CHOLECYSTECTOMY      COLONOSCOPY      COLONOSCOPY N/A 1/6/2017    Procedure: COLONOSCOPY;  Surgeon: Remy Pardo MD;  Location: [facility] ENDO;  Service: Endoscopy;  Laterality: N/A;    CORONARY ANGIOGRAPHY N/A 1/17/2019    Procedure: ANGIOGRAM, CORONARY ARTERY;  Surgeon: Sukhi Clemente MD;  Location: [facility] CATH;  Service: Cardiology;  Laterality: N/A;    CORONARY ANGIOPLASTY      CORONARY STENT PLACEMENT      evacuation of hematoma Right 05/18/2018    Right groin- Dr. ALINE Kelly, [facility]     INGUINAL HERNIA REPAIR Right 05/08/2018    LEFT HEART CATHETERIZATION Right 1/17/2019    Procedure: Left heart cath-  # [id number] B;  Surgeon: Sukhi Clemente MD;  Location: [facility] CATH;  Service: Cardiology;  Laterality: Right;    NEEDLE LOCALIZATION N/A 12/5/2018    Procedure: NEEDLE LOCALIZATION lumbar puncture;  Surgeon: Gil Salazar MD;  Location: [facility] CATH;  Service: Interventional Radiology;  Laterality: N/A;    PORTACATH PLACEMENT      TONSILLECTOMY       Family History   Problem Relation Age of Onset    Heart disease Father     Hypertension Father      Social History     Socioeconomic History    Marital status:      Spouse name: Not on file    Number of children: Not on file    Years of education:  Not on file    Highest education level: Not on file   Occupational History    Not on file   Social Needs    Financial resource strain: Not on file    Food insecurity:     Worry: Not on file     Inability: Not on file    Transportation needs:     Medical: Not on file     Non-medical: Not on file   Tobacco Use    Smoking status: Former Smoker     Last attempt to quit: 1970     Years since quittin.9    Smokeless tobacco: Never Used   Substance and Sexual Activity    Alcohol use: No    Drug use: No    Sexual activity: Never   Lifestyle    Physical activity:     Days per week: Not on file     Minutes per session: Not on file    Stress: Not on file   Relationships    Social connections:     Talks on phone: Not on file     Gets together: Not on file     Attends [employer] service: Not on file     Active member of club or organization: Not on file     Attends meetings of clubs or organizations: Not on file     Relationship status: Not on file   Other Topics Concern    Not on file   Social History Narrative    Not on file       Review of patient's allergies indicates:   Allergen Reactions    Penicillins     Titanium dioxide        Current Outpatient Medications:     amLODIPine (NORVASC) 2.5 MG tablet, TAKE 1 TABLET(2.5 MG) BY MOUTH EVERY DAY, Disp: 90 tablet, Rfl: 1    aspirin (ECOTRIN) 81 MG EC tablet, Take 81 mg by mouth every morning. , Disp: , Rfl:     atorvastatin (LIPITOR) 20 MG tablet, TAKE 1 TABLET(20 MG) BY MOUTH EVERY EVENING, Disp: 90 tablet, Rfl: 1    BRILINTA 90 mg tablet, TAKE 1 TABLET(90 MG) BY MOUTH TWICE DAILY, Disp: 60 tablet, Rfl: 3    cyanocobalamin (VITAMIN B-12) 1000 MCG tablet, Take 1,000 mcg by mouth once daily. , Disp: , Rfl:     furosemide (LASIX) 20 MG tablet, TAKE 1 TABLET(20 MG) BY MOUTH EVERY DAY (Patient taking differently: Take 20 mg by mouth every other day. ), Disp: 30 tablet, Rfl: 3    HYDROcodone-acetaminophen (NORCO)  mg per tablet, Take 1 tablet by mouth  every 6 (six) hours as needed., Disp: 30 tablet, Rfl: 0    ibrutinib (IMBRUVICA) 420 mg Tab, Take 1 tablet by mouth once daily., Disp: 28 tablet, Rfl: 11    LORazepam (ATIVAN) 0.5 MG tablet, TK 1 T PO QPM PRN, Disp: , Rfl: 0    metoprolol succinate (TOPROL-XL) 50 MG 24 hr tablet, Take 1 tablet (50 mg total) by mouth once daily., Disp: 30 tablet, Rfl: 5    pantoprazole (PROTONIX) 40 MG tablet, TK 1 T PO D 30 MIN B JIL, Disp: 90 tablet, Rfl: 3    ramipril (ALTACE) 5 MG capsule, TAKE 1 CAPSULE(5 MG) BY MOUTH EVERY DAY, Disp: 90 capsule, Rfl: 0    TRELEGY ELLIPTA 100-62.5-25 mcg DsDv, Inhale 1 puff into the lungs once daily., Disp: , Rfl: 6    mupirocin (BACTROBAN) 2 % ointment, Apply topically 2 (two) times daily., Disp: 15 g, Rfl: 0    nitroGLYCERIN (NITROSTAT) 0.4 MG SL tablet, Place 1 tablet (0.4 mg total) under the tongue every 5 (five) minutes as needed., Disp: 25 tablet, Rfl: 0    Review of Systems   Constitution: Negative for chills, decreased appetite, diaphoresis, fever, malaise/fatigue (SOME) and night sweats. Weight loss: BETTER NOW.   HENT: Negative for congestion and nosebleeds.    Eyes: Negative for blurred vision and visual disturbance.   Cardiovascular: Negative for chest pain, claudication, cyanosis, dyspnea on exertion (MILD ,, BETTER WITH NASAL SPRAY), irregular heartbeat (OCC), leg swelling, near-syncope, orthopnea, palpitations, paroxysmal nocturnal dyspnea and syncope.   Respiratory: Negative for cough, hemoptysis, shortness of breath and wheezing.    Endocrine: Negative for cold intolerance.   Hematologic/Lymphatic: Negative for adenopathy. Does not bruise/bleed easily (SOME, CHRONIC).   Skin: Negative for color change, itching and rash.   Musculoskeletal: Negative for back pain and falls. Arthritis: mild.   Gastrointestinal: Negative for abdominal pain, change in bowel habit, dysphagia, hematemesis, jaundice and melena.   Genitourinary: Negative for dysuria, flank pain and frequency.  "  Neurological: Negative for brief paralysis, dizziness (occ), focal weakness, light-headedness and weakness.   Psychiatric/Behavioral: Negative for altered mental status. Memory loss: SOME. The patient is not nervous/anxious (SOME, STRESS AT HOME).    Allergic/Immunologic: Negative for environmental allergies and persistent infections.        Objective:      Vitals:    12/09/19 1130   BP: 124/66   Pulse: 65   Weight: 78.4 kg (172 lb 13.5 oz)   Height: 5' 9" (1.753 m)   PainSc:   6     Body mass index is 25.52 kg/m².    Physical Exam   Constitutional: He is oriented to person, place, and time. He appears well-developed and well-nourished.   HENT:   Head: Normocephalic and atraumatic.   Mouth/Throat: Oropharynx is clear and moist.   Eyes: Pupils are equal, round, and reactive to light. Conjunctivae and EOM are normal.   Neck: Neck supple. Normal carotid pulses, no hepatojugular reflux and no JVD present. Carotid bruit is not present. No thyromegaly present.   Cardiovascular: Normal rate and regular rhythm. Exam reveals no gallop, no distant heart sounds, no friction rub and no midsystolic click.   Murmur heard.   Medium-pitched early systolic murmur is present with a grade of 1/6 at the lower left sternal border and apex.  Pulses:       Carotid pulses are 2+ on the right side, and 2+ on the left side.       Radial pulses are 2+ on the right side, and 2+ on the left side.        Femoral pulses are 2+ on the right side, and 2+ on the left side.       Dorsalis pedis pulses are 2+ on the right side, and 2+ on the left side.        Posterior tibial pulses are 2+ on the right side, and 2+ on the left side.   Pulmonary/Chest: Effort normal and breath sounds normal. No tachypnea and no bradypnea.   Abdominal: Soft. Bowel sounds are normal. He exhibits no distension and no mass.   Musculoskeletal: Normal range of motion. He exhibits no edema.   Lymphadenopathy:     He has no cervical adenopathy.     He has no axillary " adenopathy.   Neurological: He is alert and oriented to person, place, and time. No cranial nerve deficit.   Skin: Skin is warm and dry. No erythema.   Psychiatric: He has a normal mood and affect. His speech is normal and behavior is normal.               ..    Chemistry        Component Value Date/Time     10/31/2019 1447    K 4.2 10/31/2019 1447     10/31/2019 1447    CO2 27 10/31/2019 1447    BUN 17 10/31/2019 1447    CREATININE 1.23 10/31/2019 1447    GLU 83 10/31/2019 1447        Component Value Date/Time    CALCIUM 9.4 10/31/2019 1447    ALKPHOS 74 10/31/2019 1447    AST 34 10/31/2019 1447    AST 33 02/18/2016 1158    ALT 39 10/31/2019 1447    BILITOT 1.1 10/31/2019 1447    ESTGFRAFRICA >60 10/31/2019 1447    EGFRNONAA 55 (A) 10/31/2019 1447            ..  Lab Results   Component Value Date    CHOL 93 (L) 01/15/2019    CHOL 114 (L) 03/17/2017    CHOL 135 08/19/2016     Lab Results   Component Value Date    HDL 37 (L) 01/15/2019    HDL 40 03/17/2017    HDL 32 (L) 08/19/2016     Lab Results   Component Value Date    LDLCALC 42.4 (L) 01/15/2019    LDLCALC 65.8 03/17/2017    LDLCALC 85.4 08/19/2016     Lab Results   Component Value Date    TRIG 68 01/15/2019    TRIG 41 03/17/2017    TRIG 88 08/19/2016     Lab Results   Component Value Date    CHOLHDL 39.8 01/15/2019    CHOLHDL 35.1 03/17/2017    CHOLHDL 23.7 08/19/2016     ..  Lab Results   Component Value Date    WBC 4.74 10/31/2019    HGB 13.2 (L) 10/31/2019    HCT 41.2 10/31/2019    MCV 93 10/31/2019    PLT 86 (L) 10/31/2019       Test(s) Reviewed  I have reviewed the following in detail:  [] Stress test   [] Angiography   [] Echocardiogram   [x] Labs   [] Other:       Assessment:         ICD-10-CM ICD-9-CM   1. Chronic systolic heart failure I50.22 428.22   2. Coronary artery disease of native artery of native heart with stable angina pectoris I25.118 414.01     413.9   3. Long term (current) use of antithrombotics/antiplatelets Z79.02 V58.63   4.  Essential hypertension I10 401.9   5. Hypercholesterolemia E78.00 272.0     Problem List Items Addressed This Visit        Cardiac/Vascular    Essential hypertension    Coronary artery disease    Relevant Orders    Echo    Hypercholesterolemia    Relevant Orders    Lipid panel    Systolic heart failure - Primary    Relevant Orders    Echo       Hematology    Long term (current) use of antithrombotics/antiplatelets           Plan:           DAILY WEIGHT EXPLAINED 2 LB PER DAY OR 5 LB PER WEEK RULE, REASSESS EJECTION FRACTION,ALL CV CLINICALLY STABLE, CLASS 0-1 ANGINA, CLASS 1-2 HF, NO TIA, NO CLINICAL ARRHYTHMIA,CONTINUE CURRENT MEDS, EDUCATION, DIET, EXERCISE, WILL DECIDE ABOUT D SEEING BRILINTA NEXT TIME, RETURN TO CLINIC IN 4MO WITH LABS AND ECHO, PATIENT WAS TO BE SEEN SOONER, GETS WORRIED ABOUT SYMPTOMS AND HEALTH  Chronic systolic heart failure  -     Echo; Future    Coronary artery disease of native artery of native heart with stable angina pectoris  -     Echo; Future    Long term (current) use of antithrombotics/antiplatelets    Essential hypertension    Hypercholesterolemia  -     Lipid panel; Future; Expected date: 06/09/2020    Other orders  -     atorvastatin (LIPITOR) 20 MG tablet; TAKE 1 TABLET(20 MG) BY MOUTH EVERY EVENING  Dispense: 90 tablet; Refill: 1    RTC Low level/low impact aerobic exercise 5x's/wk. Heart healthy diet and risk factor modification.    See labs and med orders.    Aerobic exercise 5x's/wk. Heart healthy diet and risk factor modification.    See labs and med orders.

## 2019-12-16 RX ORDER — TICAGRELOR 90 MG/1
TABLET ORAL
Qty: 60 TABLET | Refills: 0 | Status: SHIPPED | OUTPATIENT
Start: 2019-12-16 | End: 2020-01-14

## 2020-01-01 ENCOUNTER — CLINICAL SUPPORT (OUTPATIENT)
Dept: AUDIOLOGY | Facility: CLINIC | Age: 80
End: 2020-01-01
Payer: MEDICARE

## 2020-01-01 ENCOUNTER — OFFICE VISIT (OUTPATIENT)
Dept: OTOLARYNGOLOGY | Facility: CLINIC | Age: 80
End: 2020-01-01
Payer: MEDICARE

## 2020-01-01 ENCOUNTER — DOCUMENTATION ONLY (OUTPATIENT)
Dept: INFUSION THERAPY | Facility: HOSPITAL | Age: 80
End: 2020-01-01

## 2020-01-01 ENCOUNTER — NURSE TRIAGE (OUTPATIENT)
Dept: ADMINISTRATIVE | Facility: CLINIC | Age: 80
End: 2020-01-01

## 2020-01-01 ENCOUNTER — PATIENT MESSAGE (OUTPATIENT)
Dept: OTHER | Facility: OTHER | Age: 80
End: 2020-01-01

## 2020-01-01 ENCOUNTER — INFUSION (OUTPATIENT)
Dept: INFUSION THERAPY | Facility: HOSPITAL | Age: 80
End: 2020-01-01
Attending: PHYSICIAN ASSISTANT
Payer: MEDICARE

## 2020-01-01 ENCOUNTER — TELEPHONE (OUTPATIENT)
Dept: CARDIOLOGY | Facility: CLINIC | Age: 80
End: 2020-01-01

## 2020-01-01 ENCOUNTER — LAB VISIT (OUTPATIENT)
Dept: LAB | Facility: HOSPITAL | Age: 80
End: 2020-01-01
Attending: INTERNAL MEDICINE
Payer: MEDICARE

## 2020-01-01 ENCOUNTER — INFUSION (OUTPATIENT)
Dept: INFUSION THERAPY | Facility: HOSPITAL | Age: 80
End: 2020-01-01
Attending: INTERNAL MEDICINE
Payer: MEDICARE

## 2020-01-01 ENCOUNTER — OFFICE VISIT (OUTPATIENT)
Dept: CARDIOLOGY | Facility: CLINIC | Age: 80
End: 2020-01-01
Payer: MEDICARE

## 2020-01-01 VITALS — HEIGHT: 69 IN | WEIGHT: 177.94 LBS | BODY MASS INDEX: 26.35 KG/M2

## 2020-01-01 VITALS
SYSTOLIC BLOOD PRESSURE: 127 MMHG | WEIGHT: 176.38 LBS | HEART RATE: 70 BPM | BODY MASS INDEX: 26.12 KG/M2 | DIASTOLIC BLOOD PRESSURE: 65 MMHG | HEIGHT: 69 IN | OXYGEN SATURATION: 95 %

## 2020-01-01 VITALS — TEMPERATURE: 98 F

## 2020-01-01 DIAGNOSIS — C91.10 LEUKEMIA, LYMPHOCYTIC, CHRONIC: Primary | ICD-10-CM

## 2020-01-01 DIAGNOSIS — Z71.89 HEARING AID CONSULTATION: Primary | ICD-10-CM

## 2020-01-01 DIAGNOSIS — Z79.02 LONG TERM (CURRENT) USE OF ANTITHROMBOTICS/ANTIPLATELETS: ICD-10-CM

## 2020-01-01 DIAGNOSIS — H90.3 SENSORINEURAL HEARING LOSS (SNHL) OF BOTH EARS: Primary | ICD-10-CM

## 2020-01-01 DIAGNOSIS — H90.3 BILATERAL SENSORINEURAL HEARING LOSS: Primary | ICD-10-CM

## 2020-01-01 DIAGNOSIS — H61.23 BILATERAL IMPACTED CERUMEN: ICD-10-CM

## 2020-01-01 DIAGNOSIS — R44.3 HALLUCINATIONS: ICD-10-CM

## 2020-01-01 DIAGNOSIS — E78.00 HYPERCHOLESTEROLEMIA: ICD-10-CM

## 2020-01-01 DIAGNOSIS — C91.12 CLL (CHRONIC LYMPHOID LEUKEMIA) IN RELAPSE: ICD-10-CM

## 2020-01-01 DIAGNOSIS — I10 ESSENTIAL HYPERTENSION: ICD-10-CM

## 2020-01-01 DIAGNOSIS — I25.708 CORONARY ARTERY DISEASE INVOLVING CORONARY BYPASS GRAFT OF NATIVE HEART WITH OTHER FORMS OF ANGINA PECTORIS: Primary | ICD-10-CM

## 2020-01-01 DIAGNOSIS — H93.293 IMPAIRED AUDITORY DISCRIMINATION, BILATERAL: ICD-10-CM

## 2020-01-01 LAB
ALBUMIN SERPL BCP-MCNC: 3.8 G/DL (ref 3.5–5.2)
ALP SERPL-CCNC: 56 U/L (ref 38–145)
ALT SERPL W/O P-5'-P-CCNC: 18 U/L (ref 0–50)
ANION GAP SERPL CALC-SCNC: 6 MMOL/L (ref 8–16)
AST SERPL-CCNC: 33 U/L (ref 17–59)
BASOPHILS # BLD AUTO: 0.06 K/UL (ref 0–0.2)
BASOPHILS NFR BLD: 1 % (ref 0–1.9)
BILIRUB DIRECT SERPL-MCNC: 0.3 MG/DL (ref 0–0.3)
BILIRUB SERPL-MCNC: 1 MG/DL (ref 0.2–1.3)
BILIRUB SERPL-MCNC: 1 MG/DL (ref 0.2–1.3)
BUN SERPL-MCNC: 23 MG/DL (ref 9–21)
CALCIUM SERPL-MCNC: 9.2 MG/DL (ref 8.4–10.2)
CHLORIDE SERPL-SCNC: 103 MMOL/L (ref 95–110)
CO2 SERPL-SCNC: 29 MMOL/L (ref 22–31)
CREAT SERPL-MCNC: 1.12 MG/DL (ref 0.5–1.4)
DIFFERENTIAL METHOD: ABNORMAL
EOSINOPHIL # BLD AUTO: 0.1 K/UL (ref 0–0.5)
EOSINOPHIL NFR BLD: 1.4 % (ref 0–8)
ERYTHROCYTE [DISTWIDTH] IN BLOOD BY AUTOMATED COUNT: 14.3 % (ref 11.5–14.5)
EST. GFR  (AFRICAN AMERICAN): >60 ML/MIN/1.73 M^2
EST. GFR  (NON AFRICAN AMERICAN): >60 ML/MIN/1.73 M^2
GLUCOSE SERPL-MCNC: 126 MG/DL (ref 70–110)
HAPTOGLOB SERPL-MCNC: 275 MG/DL (ref 30–200)
HCT VFR BLD AUTO: 38.7 % (ref 40–54)
HGB BLD-MCNC: 12.3 G/DL (ref 14–18)
IMM GRANULOCYTES # BLD AUTO: 0.2 K/UL (ref 0–0.04)
IMM GRANULOCYTES NFR BLD AUTO: 3.5 % (ref 0–0.5)
LDH SERPL L TO P-CCNC: 401 U/L (ref 313–618)
LYMPHOCYTES # BLD AUTO: 0.8 K/UL (ref 1–4.8)
LYMPHOCYTES NFR BLD: 13.7 % (ref 18–48)
MCH RBC QN AUTO: 30.6 PG (ref 27–31)
MCHC RBC AUTO-ENTMCNC: 31.8 G/DL (ref 32–36)
MCV RBC AUTO: 96 FL (ref 82–98)
MONOCYTES # BLD AUTO: 1 K/UL (ref 0.3–1)
MONOCYTES NFR BLD: 17 % (ref 4–15)
NEUTROPHILS # BLD AUTO: 3.7 K/UL (ref 1.8–7.7)
NEUTROPHILS NFR BLD: 63.4 % (ref 38–73)
NRBC BLD-RTO: 0 /100 WBC
PLATELET # BLD AUTO: 98 K/UL (ref 150–350)
PMV BLD AUTO: 12.3 FL (ref 9.2–12.9)
POTASSIUM SERPL-SCNC: 4.3 MMOL/L (ref 3.5–5.1)
PROT SERPL-MCNC: 5.8 G/DL (ref 6–8.4)
RBC # BLD AUTO: 4.02 M/UL (ref 4.6–6.2)
SODIUM SERPL-SCNC: 138 MMOL/L (ref 136–145)
WBC # BLD AUTO: 5.76 K/UL (ref 3.9–12.7)

## 2020-01-01 PROCEDURE — 25000003 PHARM REV CODE 250: Mod: PN | Performed by: PHYSICIAN ASSISTANT

## 2020-01-01 PROCEDURE — 83615 LACTATE (LD) (LDH) ENZYME: CPT

## 2020-01-01 PROCEDURE — 92557 COMPREHENSIVE HEARING TEST: CPT | Mod: PBBFAC,PO | Performed by: AUDIOLOGIST

## 2020-01-01 PROCEDURE — 96523 IRRIG DRUG DELIVERY DEVICE: CPT | Mod: PN

## 2020-01-01 PROCEDURE — 92567 TYMPANOMETRY: CPT | Mod: PBBFAC,PO | Performed by: AUDIOLOGIST

## 2020-01-01 PROCEDURE — 99203 OFFICE O/P NEW LOW 30 MIN: CPT | Mod: 25,S$PBB,, | Performed by: NURSE PRACTITIONER

## 2020-01-01 PROCEDURE — A4216 STERILE WATER/SALINE, 10 ML: HCPCS | Mod: PN | Performed by: PHYSICIAN ASSISTANT

## 2020-01-01 PROCEDURE — 99214 PR OFFICE/OUTPT VISIT, EST, LEVL IV, 30-39 MIN: ICD-10-PCS | Mod: S$PBB,,, | Performed by: INTERNAL MEDICINE

## 2020-01-01 PROCEDURE — 80053 COMPREHEN METABOLIC PANEL: CPT

## 2020-01-01 PROCEDURE — 83010 ASSAY OF HAPTOGLOBIN QUANT: CPT | Mod: PN

## 2020-01-01 PROCEDURE — 63600175 PHARM REV CODE 636 W HCPCS: Mod: PN | Performed by: PHYSICIAN ASSISTANT

## 2020-01-01 PROCEDURE — 82248 BILIRUBIN DIRECT: CPT

## 2020-01-01 PROCEDURE — 80053 COMPREHEN METABOLIC PANEL: CPT | Mod: PN

## 2020-01-01 PROCEDURE — 99203 PR OFFICE/OUTPT VISIT, NEW, LEVL III, 30-44 MIN: ICD-10-PCS | Mod: 25,S$PBB,, | Performed by: NURSE PRACTITIONER

## 2020-01-01 PROCEDURE — 99999 PR PBB SHADOW E&M-EST. PATIENT-LVL IV: CPT | Mod: PBBFAC,,, | Performed by: INTERNAL MEDICINE

## 2020-01-01 PROCEDURE — 85025 COMPLETE CBC W/AUTO DIFF WBC: CPT | Mod: PN

## 2020-01-01 PROCEDURE — 99213 OFFICE O/P EST LOW 20 MIN: CPT | Mod: PBBFAC,PO,25 | Performed by: NURSE PRACTITIONER

## 2020-01-01 PROCEDURE — 83615 LACTATE (LD) (LDH) ENZYME: CPT | Mod: PN

## 2020-01-01 PROCEDURE — 99211 OFF/OP EST MAY X REQ PHY/QHP: CPT | Mod: PBBFAC,27,PO,25

## 2020-01-01 PROCEDURE — 99999 PR PBB SHADOW E&M-EST. PATIENT-LVL I: CPT | Mod: PBBFAC,,,

## 2020-01-01 PROCEDURE — G0268 REMOVAL OF IMPACTED WAX MD: HCPCS | Mod: S$PBB,,, | Performed by: NURSE PRACTITIONER

## 2020-01-01 PROCEDURE — 36415 COLL VENOUS BLD VENIPUNCTURE: CPT | Mod: PN

## 2020-01-01 PROCEDURE — 99999 PR PBB SHADOW E&M-EST. PATIENT-LVL III: ICD-10-PCS | Mod: PBBFAC,,, | Performed by: NURSE PRACTITIONER

## 2020-01-01 PROCEDURE — G0268 PR REMOVAL OF IMPACTED WAX MD: ICD-10-PCS | Mod: S$PBB,,, | Performed by: NURSE PRACTITIONER

## 2020-01-01 PROCEDURE — 85025 COMPLETE CBC W/AUTO DIFF WBC: CPT

## 2020-01-01 PROCEDURE — 99999 PR PBB SHADOW E&M-EST. PATIENT-LVL I: ICD-10-PCS | Mod: PBBFAC,,,

## 2020-01-01 PROCEDURE — G0268 REMOVAL OF IMPACTED WAX MD: HCPCS | Mod: PBBFAC,PO | Performed by: NURSE PRACTITIONER

## 2020-01-01 PROCEDURE — 99214 OFFICE O/P EST MOD 30 MIN: CPT | Mod: S$PBB,,, | Performed by: INTERNAL MEDICINE

## 2020-01-01 PROCEDURE — 99999 PR PBB SHADOW E&M-EST. PATIENT-LVL III: CPT | Mod: PBBFAC,,, | Performed by: NURSE PRACTITIONER

## 2020-01-01 PROCEDURE — 82248 BILIRUBIN DIRECT: CPT | Mod: PN

## 2020-01-01 PROCEDURE — 99999 PR PBB SHADOW E&M-EST. PATIENT-LVL IV: ICD-10-PCS | Mod: PBBFAC,,, | Performed by: INTERNAL MEDICINE

## 2020-01-01 PROCEDURE — 99214 OFFICE O/P EST MOD 30 MIN: CPT | Mod: PBBFAC,PO | Performed by: INTERNAL MEDICINE

## 2020-01-01 PROCEDURE — 83010 ASSAY OF HAPTOGLOBIN QUANT: CPT

## 2020-01-01 RX ORDER — PANTOPRAZOLE SODIUM 40 MG/1
TABLET, DELAYED RELEASE ORAL
Qty: 90 TABLET | Refills: 0 | Status: SHIPPED | OUTPATIENT
Start: 2020-01-01 | End: 2021-01-01

## 2020-01-01 RX ORDER — SODIUM CHLORIDE 0.9 % (FLUSH) 0.9 %
10 SYRINGE (ML) INJECTION
Status: DISCONTINUED | OUTPATIENT
Start: 2020-01-01 | End: 2020-01-01 | Stop reason: HOSPADM

## 2020-01-01 RX ORDER — HEPARIN 100 UNIT/ML
500 SYRINGE INTRAVENOUS
Status: CANCELLED | OUTPATIENT
Start: 2020-01-01

## 2020-01-01 RX ORDER — SODIUM CHLORIDE 0.9 % (FLUSH) 0.9 %
10 SYRINGE (ML) INJECTION
Status: CANCELLED
Start: 2020-01-01

## 2020-01-01 RX ORDER — ATORVASTATIN CALCIUM 10 MG/1
10 TABLET, FILM COATED ORAL NIGHTLY
Qty: 90 TABLET | Refills: 1 | Status: SHIPPED | OUTPATIENT
Start: 2020-01-01 | End: 2020-01-01 | Stop reason: SDUPTHER

## 2020-01-01 RX ORDER — ATORVASTATIN CALCIUM 10 MG/1
10 TABLET, FILM COATED ORAL NIGHTLY
Qty: 90 TABLET | Refills: 0 | Status: SHIPPED | OUTPATIENT
Start: 2020-01-01 | End: 2021-01-01 | Stop reason: SDUPTHER

## 2020-01-01 RX ORDER — RAMIPRIL 5 MG/1
5 CAPSULE ORAL DAILY
Qty: 90 CAPSULE | Refills: 0 | Status: SHIPPED | OUTPATIENT
Start: 2020-01-01 | End: 2021-01-01 | Stop reason: SDUPTHER

## 2020-01-01 RX ORDER — HEPARIN 100 UNIT/ML
500 SYRINGE INTRAVENOUS
Status: COMPLETED | OUTPATIENT
Start: 2020-01-01 | End: 2020-01-01

## 2020-01-01 RX ORDER — FUROSEMIDE 20 MG/1
TABLET ORAL
Qty: 15 TABLET | Refills: 1 | Status: SHIPPED | OUTPATIENT
Start: 2020-01-01 | End: 2021-01-01 | Stop reason: SDUPTHER

## 2020-01-01 RX ORDER — AMLODIPINE BESYLATE 2.5 MG/1
TABLET ORAL
Qty: 90 TABLET | Refills: 0 | Status: SHIPPED | OUTPATIENT
Start: 2020-01-01 | End: 2021-01-01 | Stop reason: SDUPTHER

## 2020-01-01 RX ORDER — METOPROLOL SUCCINATE 50 MG/1
50 TABLET, EXTENDED RELEASE ORAL DAILY
Qty: 90 TABLET | Refills: 0 | Status: SHIPPED | OUTPATIENT
Start: 2020-01-01 | End: 2021-01-01 | Stop reason: SDUPTHER

## 2020-01-01 RX ADMIN — Medication 10 ML: at 09:09

## 2020-01-01 RX ADMIN — HEPARIN 500 UNITS: 100 SYRINGE at 09:09

## 2020-01-01 RX ADMIN — HEPARIN 500 UNITS: 100 SYRINGE at 11:07

## 2020-01-01 RX ADMIN — Medication 10 ML: at 11:07

## 2020-01-01 RX ADMIN — HEPARIN SODIUM (PORCINE) LOCK FLUSH IV SOLN 100 UNIT/ML 500 UNITS: 100 SOLUTION at 03:11

## 2020-01-01 RX ADMIN — Medication 10 ML: at 03:11

## 2020-01-10 RX ORDER — AMLODIPINE BESYLATE 2.5 MG/1
TABLET ORAL
Qty: 90 TABLET | Refills: 1 | Status: SHIPPED | OUTPATIENT
Start: 2020-01-10 | End: 2020-05-06 | Stop reason: SDUPTHER

## 2020-01-14 RX ORDER — TICAGRELOR 90 MG/1
TABLET ORAL
Qty: 60 TABLET | Refills: 1 | Status: SHIPPED | OUTPATIENT
Start: 2020-01-14 | End: 2020-02-12 | Stop reason: SDUPTHER

## 2020-01-15 ENCOUNTER — INFUSION (OUTPATIENT)
Dept: INFUSION THERAPY | Facility: HOSPITAL | Age: 80
End: 2020-01-15
Attending: INTERNAL MEDICINE
Payer: MEDICARE

## 2020-01-15 DIAGNOSIS — C91.10 LEUKEMIA, LYMPHOCYTIC, CHRONIC: Primary | ICD-10-CM

## 2020-01-15 PROCEDURE — 25000003 PHARM REV CODE 250: Mod: PN | Performed by: INTERNAL MEDICINE

## 2020-01-15 PROCEDURE — 96523 IRRIG DRUG DELIVERY DEVICE: CPT | Mod: PN

## 2020-01-15 PROCEDURE — A4216 STERILE WATER/SALINE, 10 ML: HCPCS | Mod: PN | Performed by: INTERNAL MEDICINE

## 2020-01-15 PROCEDURE — 63600175 PHARM REV CODE 636 W HCPCS: Mod: PN | Performed by: INTERNAL MEDICINE

## 2020-01-15 RX ORDER — FUROSEMIDE 20 MG/1
TABLET ORAL
Qty: 30 TABLET | Refills: 3 | Status: SHIPPED | OUTPATIENT
Start: 2020-01-15 | End: 2020-01-01

## 2020-01-15 RX ORDER — SODIUM CHLORIDE 0.9 % (FLUSH) 0.9 %
10 SYRINGE (ML) INJECTION
Status: CANCELLED
Start: 2020-01-15

## 2020-01-15 RX ORDER — HEPARIN 100 UNIT/ML
500 SYRINGE INTRAVENOUS
Status: COMPLETED | OUTPATIENT
Start: 2020-01-15 | End: 2020-01-15

## 2020-01-15 RX ORDER — HEPARIN 100 UNIT/ML
500 SYRINGE INTRAVENOUS
Status: CANCELLED | OUTPATIENT
Start: 2020-01-15

## 2020-01-15 RX ORDER — SODIUM CHLORIDE 0.9 % (FLUSH) 0.9 %
10 SYRINGE (ML) INJECTION
Status: DISCONTINUED | OUTPATIENT
Start: 2020-01-15 | End: 2020-01-15 | Stop reason: HOSPADM

## 2020-01-15 RX ADMIN — HEPARIN 500 UNITS: 100 SYRINGE at 10:01

## 2020-01-15 RX ADMIN — Medication 10 ML: at 10:01

## 2020-01-15 NOTE — TELEPHONE ENCOUNTER
----- Message from Princess SOLEDAD Ruffin sent at 1/15/2020  8:49 AM CST -----  Contact: Patient  Type:  RX Refill Request    Who Called:  Patient  Refill or New Rx:   Refill  RX Name and Strength:  furosemide (LASIX) 20 MG tablet  How is the patient currently taking it? (ex. 1XDay):   TAKE 1 TABLET(20 MG) BY MOUTH EVERY DAY  Is this a 30 day or 90 day RX:  30  Preferred Pharmacy with phone number:    [facility] #[id number] - [facility] [address] [place] [address] & [address] [address]  Phone: [phone] Fax: [phone]  Local or Mail Order:  Local  Ordering Provider:  Dr. Chyna Cody Call Back Number:  [phone]  Additional Information:  Requesting a call when Rx is sent over.

## 2020-02-04 PROBLEM — N18.30 STAGE 3 CHRONIC KIDNEY DISEASE: Status: ACTIVE | Noted: 2020-02-04

## 2020-02-04 PROBLEM — Z91.199 NO-SHOW FOR APPOINTMENT: Status: ACTIVE | Noted: 2020-02-04

## 2020-02-11 RX ORDER — METOPROLOL SUCCINATE 50 MG/1
TABLET, EXTENDED RELEASE ORAL
Qty: 30 TABLET | Refills: 1 | Status: SHIPPED | OUTPATIENT
Start: 2020-02-11 | End: 2020-02-12

## 2020-02-12 RX ORDER — METOPROLOL SUCCINATE 50 MG/1
TABLET, EXTENDED RELEASE ORAL
Qty: 90 TABLET | Refills: 0 | Status: SHIPPED | OUTPATIENT
Start: 2020-02-12 | End: 2020-05-12 | Stop reason: SDUPTHER

## 2020-02-12 RX ORDER — METOPROLOL SUCCINATE 50 MG/1
50 TABLET, EXTENDED RELEASE ORAL DAILY
Qty: 90 TABLET | Refills: 1 | OUTPATIENT
Start: 2020-02-12

## 2020-02-12 NOTE — TELEPHONE ENCOUNTER
----- Message from Ernesto Lehman sent at 2/12/2020 10:35 AM CST -----  Contact: Ptnt    Type:  RX Refill Request    Who Called: [phone]    Refill or New Rx:  Refill    RX Name and Strength: BRILINTA 90 mg tablet 60 tablet 1 1/14/2020    Sig: TAKE 1 TABLET(90 MG) BY MOUTH TWICE DAILY   Sent to pharmacy as: BRILINTA 90 mg tablet     And    metoprolol succinate (TOPROL-XL) 50 MG 24 hr tablet 90 tablet 0 2/12/2020    Sig: TAKE 1 TABLET BY MOUTH EVERY DAY   Sent to pharmacy as: metoprolol succinate (TOPROL-XL) 50 MG 24 hr tablet     How is the patient currently taking it? (ex. 1XDay):  See above    Is this a 30 day or 90 day RX:  90 day    Preferred Pharmacy with phone number:      [id number] - [facility] [address] [place] [address] AT [facility] 25 & [address] [address]  Phone: [phone] Fax: [phone]    Local or Mail Order:  Local    Ordering Provider:  Dr Chyna Cody Call Back Number:  [phone]    Additional Information:  Advised he is out completely of these medications. Please send to as soon as possible and call to confirm when sent.

## 2020-02-27 DIAGNOSIS — I10 ESSENTIAL HYPERTENSION: ICD-10-CM

## 2020-02-27 RX ORDER — RAMIPRIL 5 MG/1
CAPSULE ORAL
Qty: 90 CAPSULE | Refills: 0 | Status: SHIPPED | OUTPATIENT
Start: 2020-02-27 | End: 2020-02-28

## 2020-02-27 NOTE — TELEPHONE ENCOUNTER
----- Message from Waldo DORADO Don sent at 2/27/2020  9:02 AM CST -----  Contact: same  Patient called in and stated he wants Dr. Clemente to start refilling his Rx for ramipril (ALTACE) 5 MG capsule 90 capsule, 0 refills last  Filled on  9/1/2019 Sig: TAKE 1 CAPSULE(5 MG) BY MOUTH EVERY DAY.  Patient refused to call Dr. Bower who is the physician who fills this medication.      [facility] #[id number] - [facility] [id number] [place] [address] & [facility] [address]  [address] [place] [address]  Phone: [phone] Fax: [phone]    Patient call back number is [phone]

## 2020-03-13 NOTE — TELEPHONE ENCOUNTER
----- Message from Shelley Broussard sent at 3/13/2020 10:42 AM CDT -----  Contact: Jan pacheco  Type:  RX Refill Request    Who Called:  Jan  Refill or New Rx:  refill  RX Name and Strength:  ticagrelor (BRILINTA) 90 mg tablet  How is the patient currently taking it? (ex. 1XDay):  bid  Is this a 30 day or 90 day RX:  90  Preferred Pharmacy with phone number:    [facility] #[id number] [facility] [address] [place] [address] & [place] [address]  [address] [place] [address]  Phone: [phone] Fax: [phone]    Local or Mail Order:  local  Ordering Provider:  Chyna Cody Call Back Number:  [phone]  Additional Information:  Charbel calll pt regarding refill

## 2020-03-17 ENCOUNTER — TELEPHONE (OUTPATIENT)
Dept: CARDIOLOGY | Facility: CLINIC | Age: 80
End: 2020-03-17

## 2020-03-17 RX ORDER — ATORVASTATIN CALCIUM 20 MG/1
TABLET, FILM COATED ORAL
Qty: 90 TABLET | Refills: 0 | Status: SHIPPED | OUTPATIENT
Start: 2020-03-17 | End: 2020-06-15

## 2020-03-17 RX ORDER — PANTOPRAZOLE SODIUM 40 MG/1
TABLET, DELAYED RELEASE ORAL
Qty: 90 TABLET | Refills: 0 | Status: SHIPPED | OUTPATIENT
Start: 2020-03-17 | End: 2020-06-16

## 2020-03-17 NOTE — TELEPHONE ENCOUNTER
----- Message from Ghada Madrigal sent at 3/17/2020 10:48 AM CDT -----  Contact: pt [phone]  Refill   Patent called for a refill Atorvastatin 20 MG and Pantoprazole 40 MG   90 DAY REFILLS   Takes once a day   Pharmacy [facility]    Call back [phone]

## 2020-04-08 ENCOUNTER — INFUSION (OUTPATIENT)
Dept: INFUSION THERAPY | Facility: HOSPITAL | Age: 80
End: 2020-04-08
Attending: INTERNAL MEDICINE
Payer: MEDICARE

## 2020-04-08 DIAGNOSIS — C91.10 LEUKEMIA, LYMPHOCYTIC, CHRONIC: Primary | ICD-10-CM

## 2020-04-08 PROCEDURE — 25000003 PHARM REV CODE 250: Mod: PN | Performed by: INTERNAL MEDICINE

## 2020-04-08 PROCEDURE — A4216 STERILE WATER/SALINE, 10 ML: HCPCS | Mod: PN | Performed by: INTERNAL MEDICINE

## 2020-04-08 PROCEDURE — 96523 IRRIG DRUG DELIVERY DEVICE: CPT | Mod: PN

## 2020-04-08 RX ORDER — SODIUM CHLORIDE 0.9 % (FLUSH) 0.9 %
10 SYRINGE (ML) INJECTION
Status: DISCONTINUED | OUTPATIENT
Start: 2020-04-08 | End: 2020-04-08 | Stop reason: HOSPADM

## 2020-04-08 RX ADMIN — Medication 10 ML: at 11:04

## 2020-04-13 ENCOUNTER — PATIENT MESSAGE (OUTPATIENT)
Dept: CARDIOLOGY | Facility: CLINIC | Age: 80
End: 2020-04-13

## 2020-04-14 NOTE — TELEPHONE ENCOUNTER
----- Message from Vineet Staton sent at 4/14/2020  9:26 AM CDT -----  Contact: pt  Type:  RX Refill Request    Who Called:  pt  Refill or New Rx:  refill  RX Name and Strength:  ticagrelor (BRILINTA) 90 mg tablet  How is the patient currently taking it? (ex. 1XDay):  2 x day  Is this a 30 day or 90 day RX:  90  Preferred Pharmacy with phone number:    [email or web address] #[id number] - [facility] [address] [place] [address] AT [facility] 25 & [place] [address]  [address] [place] [address]  Phone: [phone] Fax: [phone]    Local or Mail Order:    Ordering Provider:    Best Call Back Number:  [phone]  Additional Information:  Pt has 1 pill left

## 2020-05-06 RX ORDER — AMLODIPINE BESYLATE 2.5 MG/1
TABLET ORAL
Qty: 90 TABLET | Refills: 1 | Status: SHIPPED | OUTPATIENT
Start: 2020-05-06 | End: 2020-01-01 | Stop reason: SDUPTHER

## 2020-05-06 NOTE — TELEPHONE ENCOUNTER
----- Message from Vineet Staton sent at 5/6/2020  9:24 AM CDT -----  Contact: pt  Type:  RX Refill Request    Who Called:  pt  Refill or New Rx:  refill  RX Name and Strength:  amLODIPine (NORVASC) 2.5 MG tablet  How is the patient currently taking it? (ex. 1XDay):  1 x day  Is this a 30 day or 90 day RX:  90  Preferred Pharmacy with phone number:    [facility] #[id number] [facility] [address] [place] [address] & [place] [address]  [address] [place] [address]  Phone: [phone] Fax: [phone]    Local or Mail Order:    Ordering Provider:    Best Call Back Number:  [phone]  Additional Information:

## 2020-05-11 NOTE — TELEPHONE ENCOUNTER
----- Message from Jac Mendosa sent at 5/11/2020  9:33 AM CDT -----  Type:  RX Refill Request    Who Called:  Patient  Refill or New Rx:  Refill  RX Name and Strength:  ticagrelor (BRILINTA) 90 mg tablet       How is the patient currently taking it? (ex. 1XDay):  2XDay  Is this a 30 day or 90 day RX:  30  Preferred Pharmacy with phone number:    [facility] #[id number] - [facility] [address] [place] [address] & [place] [address]  [address] [place] [address]  Phone: [phone] Fax: [phone]        Local or Mail Order: Local  Ordering Provider:  Chyna Cody Call Back Number:  [phone]  Additional Information:

## 2020-05-12 DIAGNOSIS — I10 ESSENTIAL HYPERTENSION: ICD-10-CM

## 2020-05-12 RX ORDER — RAMIPRIL 5 MG/1
5 CAPSULE ORAL DAILY
Qty: 90 CAPSULE | Refills: 0 | Status: SHIPPED | OUTPATIENT
Start: 2020-05-12 | End: 2020-01-01 | Stop reason: SDUPTHER

## 2020-05-12 RX ORDER — METOPROLOL SUCCINATE 50 MG/1
50 TABLET, EXTENDED RELEASE ORAL DAILY
Qty: 90 TABLET | Refills: 0 | Status: SHIPPED | OUTPATIENT
Start: 2020-05-12 | End: 2020-01-01

## 2020-05-12 NOTE — TELEPHONE ENCOUNTER
----- Message from Tabby Bernard sent at 5/12/2020  9:32 AM CDT -----  Contact: Patient  Type:  RX Refill Request    Who Called:  Patient  Refill or New Rx: Refills    1.  RX Name and Strength:  metoprolol succinate (TOPROL-XL) 50 MG 24 hr tablet  How is the patient currently taking it? (ex. 1XDay):  1XDay  Is this a 30 day or 90 day RX:  90    2,  RX Name and Strength:  ramipril (ALTACE) 5 MG capsule  How is the patient currently taking it? (ex. 1XDay):  1XDay  Is this a 30 day or 90 day RX:  90    Preferred Pharmacy with phone number:  [facility] #[id number] [facility] [id number] [place] [address] 25 AT [facility] 25 & [facility] 190 [phone] (Phone)   Local or Mail Order:  Local  Ordering Provider:  Chyna Cody Call Back Number:  [phone]  Additional Information:

## 2020-06-02 ENCOUNTER — LAB VISIT (OUTPATIENT)
Dept: INFUSION THERAPY | Facility: HOSPITAL | Age: 80
End: 2020-06-02
Attending: INTERNAL MEDICINE
Payer: MEDICARE

## 2020-06-02 DIAGNOSIS — Z03.818 ENCNTR FOR OBS FOR SUSP EXPSR TO OTH BIOLG AGENTS RULED OUT: ICD-10-CM

## 2020-06-02 PROCEDURE — U0003 INFECTIOUS AGENT DETECTION BY NUCLEIC ACID (DNA OR RNA); SEVERE ACUTE RESPIRATORY SYNDROME CORONAVIRUS 2 (SARS-COV-2) (CORONAVIRUS DISEASE [COVID-19]), AMPLIFIED PROBE TECHNIQUE, MAKING USE OF HIGH THROUGHPUT TECHNOLOGIES AS DESCRIBED BY CMS-2020-01-R: HCPCS

## 2020-06-03 LAB — SARS-COV-2 RNA RESP QL NAA+PROBE: NOT DETECTED

## 2020-06-04 ENCOUNTER — INFUSION (OUTPATIENT)
Dept: INFUSION THERAPY | Facility: HOSPITAL | Age: 80
End: 2020-06-04
Attending: INTERNAL MEDICINE
Payer: MEDICARE

## 2020-06-04 DIAGNOSIS — C91.10 LEUKEMIA, LYMPHOCYTIC, CHRONIC: Primary | ICD-10-CM

## 2020-06-04 PROCEDURE — 96523 IRRIG DRUG DELIVERY DEVICE: CPT | Mod: PN

## 2020-06-04 PROCEDURE — A4216 STERILE WATER/SALINE, 10 ML: HCPCS | Mod: PN | Performed by: INTERNAL MEDICINE

## 2020-06-04 PROCEDURE — 63600175 PHARM REV CODE 636 W HCPCS: Mod: PN | Performed by: INTERNAL MEDICINE

## 2020-06-04 PROCEDURE — 25000003 PHARM REV CODE 250: Mod: PN | Performed by: INTERNAL MEDICINE

## 2020-06-04 RX ORDER — HEPARIN 100 UNIT/ML
500 SYRINGE INTRAVENOUS
Status: COMPLETED | OUTPATIENT
Start: 2020-06-04 | End: 2020-06-04

## 2020-06-04 RX ORDER — SODIUM CHLORIDE 0.9 % (FLUSH) 0.9 %
10 SYRINGE (ML) INJECTION
Status: DISCONTINUED | OUTPATIENT
Start: 2020-06-04 | End: 2020-06-04 | Stop reason: HOSPADM

## 2020-06-04 RX ADMIN — HEPARIN 500 UNITS: 100 SYRINGE at 12:06

## 2020-06-04 RX ADMIN — Medication 10 ML: at 12:06

## 2020-07-10 NOTE — PROGRESS NOTES
Subjective:    Patient ID:  Jan Kelly is a 79 y.o. male who presents for Coronary Artery Disease (LABS), Cardiomyopathy, and Congestive Heart Failure        HPI    DISCUSSED LABS AND GOALS LDL 12, HDL 65, DOING OK, SOME HALLUCINATION WITH CANCER  MEDS, AND PAIN MEDICATIONS, EASY BRUISING, NO CHEST PAIN, NO TIA TYPE SYMPTOMS NO NEAR-SYNCOPE,, SEE ROS  Past Medical History:   Diagnosis Date    Cancer     CLL    Cardiomyopathy     Chronic lymphocytic leukemia     Coronary artery disease     wih stents    Diabetes mellitus     Encounter for blood transfusion     Heart block     Heart murmur     Hyperlipidemia     Hypertension     Valvular regurgitation      Past Surgical History:   Procedure Laterality Date    CARDIAC CATHETERIZATION      CHOLECYSTECTOMY      COLONOSCOPY      COLONOSCOPY N/A 1/6/2017    Procedure: COLONOSCOPY;  Surgeon: Remy Pardo MD;  Location: [facility] ENDO;  Service: Endoscopy;  Laterality: N/A;    CORONARY ANGIOGRAPHY N/A 1/17/2019    Procedure: ANGIOGRAM, CORONARY ARTERY;  Surgeon: Sukhi Clemente MD;  Location: [facility] CATH;  Service: Cardiology;  Laterality: N/A;    CORONARY ANGIOPLASTY      CORONARY STENT PLACEMENT      evacuation of hematoma Right 05/18/2018    Right groin- Dr. ALINE Kelly, [facility]     INGUINAL HERNIA REPAIR Right 05/08/2018    LEFT HEART CATHETERIZATION Right 1/17/2019    Procedure: Left heart cath-  # [id number] B;  Surgeon: Sukhi Clemente MD;  Location: [facility] CATH;  Service: Cardiology;  Laterality: Right;    NEEDLE LOCALIZATION N/A 12/5/2018    Procedure: NEEDLE LOCALIZATION lumbar puncture;  Surgeon: Gil Salazar MD;  Location: [facility] CATH;  Service: Interventional Radiology;  Laterality: N/A;    PORTACATH PLACEMENT      TONSILLECTOMY       Family History   Problem Relation Age of Onset    Heart disease Father     Hypertension Father      Social History     Socioeconomic History    Marital status:      Spouse name: Not on file    Number of  children: Not on file    Years of education: Not on file    Highest education level: Not on file   Occupational History    Not on file   Social Needs    Financial resource strain: Not on file    Food insecurity     Worry: Not on file     Inability: Not on file    Transportation needs     Medical: Not on file     Non-medical: Not on file   Tobacco Use    Smoking status: Former Smoker     Quit date: 1970     Years since quittin.5    Smokeless tobacco: Never Used   Substance and Sexual Activity    Alcohol use: No    Drug use: No    Sexual activity: Never   Lifestyle    Physical activity     Days per week: Not on file     Minutes per session: Not on file    Stress: Not on file   Relationships    Social connections     Talks on phone: Not on file     Gets together: Not on file     Attends Yazidism service: Not on file     Active member of club or organization: Not on file     Attends meetings of clubs or organizations: Not on file     Relationship status: Not on file   Other Topics Concern    Not on file   Social History Narrative    Not on file       Review of patient's allergies indicates:   Allergen Reactions    Penicillins     Titanium dioxide        Current Outpatient Medications:     amLODIPine (NORVASC) 2.5 MG tablet, TAKE 1 TABLET(2.5 MG) BY MOUTH EVERY DAY, Disp: 90 tablet, Rfl: 1    aspirin (ECOTRIN) 81 MG EC tablet, Take 81 mg by mouth every morning. , Disp: , Rfl:     doxycycline (MONODOX) 100 MG capsule, Take 1 capsule (100 mg total) by mouth 2 (two) times daily., Disp: 20 capsule, Rfl: 0    furosemide (LASIX) 20 MG tablet, TAKE 1 TABLET(20 MG) BY MOUTH EVERY DAY (Patient taking differently: TAKE 1 TABLET(20 MG) BY MOUTH QOD), Disp: 30 tablet, Rfl: 3    HYDROcodone-acetaminophen (NORCO)  mg per tablet, Take 1 tablet by mouth every 6 (six) hours as needed., Disp: 30 tablet, Rfl: 0    ibrutinib (IMBRUVICA) 420 mg Tab, Take 1 tablet by mouth once daily., Disp: 28 tablet, Rfl:  11    metoprolol succinate (TOPROL-XL) 50 MG 24 hr tablet, Take 1 tablet (50 mg total) by mouth once daily., Disp: 90 tablet, Rfl: 0    pantoprazole (PROTONIX) 40 MG tablet, TAKE ONE TABLET BY MOUTH DAILY 30 MINUTES BEFORE BREAKFAST, Disp: 90 tablet, Rfl: 0    ramipriL (ALTACE) 5 MG capsule, Take 1 capsule (5 mg total) by mouth once daily., Disp: 90 capsule, Rfl: 0    TRELEGY ELLIPTA 100-62.5-25 mcg DsDv, Inhale 1 puff into the lungs once daily., Disp: , Rfl: 6    vit D3-folic acid-B2-B6-B12 2,000-800-0.32 unit-mcg-mg Tab, Take by mouth once daily., Disp: , Rfl:     atorvastatin (LIPITOR) 10 MG tablet, Take 1 tablet (10 mg total) by mouth every evening., Disp: 90 tablet, Rfl: 1    cyanocobalamin (VITAMIN B-12) 1000 MCG tablet, Take 1,000 mcg by mouth once daily. , Disp: , Rfl:     LORazepam (ATIVAN) 0.5 MG tablet, TK 1 T PO QPM PRN, Disp: , Rfl: 0    mupirocin (BACTROBAN) 2 % ointment, Apply topically 2 (two) times daily. (Patient not taking: Reported on 7/10/2020), Disp: 15 g, Rfl: 0    nitroGLYCERIN (NITROSTAT) 0.4 MG SL tablet, Place 1 tablet (0.4 mg total) under the tongue every 5 (five) minutes as needed., Disp: 25 tablet, Rfl: 0    valACYclovir (VALTREX) 1000 MG tablet, Take 1 tablet (1,000 mg total) by mouth 3 (three) times daily. for 7 days, Disp: 21 tablet, Rfl: 0    Review of Systems   Constitution: Negative for chills, decreased appetite, diaphoresis, fever, malaise/fatigue (SOME) and night sweats. Weight loss: BETTER NOW.   HENT: Negative for congestion and nosebleeds.    Eyes: Negative for blurred vision and visual disturbance.   Cardiovascular: Negative for chest pain, claudication, cyanosis, dyspnea on exertion (MILD ,, BETTER WITH NASAL SPRAY), irregular heartbeat (OCC), leg swelling, near-syncope, orthopnea, palpitations, paroxysmal nocturnal dyspnea and syncope.   Respiratory: Negative for cough, hemoptysis, shortness of breath and wheezing.    Endocrine: Negative for cold intolerance.  "  Hematologic/Lymphatic: Negative for adenopathy. Does not bruise/bleed easily (CHRONIC,).   Skin: Negative for color change, itching and rash.   Musculoskeletal: Negative for back pain and falls. Arthritis: mild.   Gastrointestinal: Negative for abdominal pain, change in bowel habit, dysphagia, hematemesis, jaundice and melena.   Genitourinary: Negative for dysuria, flank pain and frequency.   Neurological: Negative for brief paralysis, dizziness (occ), focal weakness, light-headedness and weakness.   Psychiatric/Behavioral: Negative for altered mental status. Hallucinations: SOME. Memory loss: SOME. The patient is not nervous/anxious.    Allergic/Immunologic: Negative for hives and persistent infections.        Objective:      Vitals:    07/10/20 1038   BP: 127/65   Pulse: 70   SpO2: 95%   Weight: 80 kg (176 lb 5.9 oz)   Height: 5' 9" (1.753 m)   PainSc: 0-No pain     Body mass index is 26.05 kg/m².    Physical Exam   Constitutional: He is oriented to person, place, and time. He appears well-developed and well-nourished.   HENT:   Head: Normocephalic and atraumatic.   Mouth/Throat: Oropharynx is clear and moist.   Eyes: Pupils are equal, round, and reactive to light. Conjunctivae and EOM are normal.   Neck: Neck supple. Normal carotid pulses, no hepatojugular reflux and no JVD present. Carotid bruit is not present. No thyromegaly present.   Cardiovascular: Normal rate and regular rhythm. Exam reveals no gallop, no distant heart sounds, no friction rub and no midsystolic click.   Murmur heard.   Medium-pitched early systolic murmur is present with a grade of 1/6 at the lower left sternal border and apex.  Pulses:       Carotid pulses are 2+ on the right side and 2+ on the left side.       Radial pulses are 2+ on the right side and 2+ on the left side.        Femoral pulses are 2+ on the right side and 2+ on the left side.       Dorsalis pedis pulses are 2+ on the right side and 2+ on the left side.        Posterior " tibial pulses are 2+ on the right side and 2+ on the left side.   Pulmonary/Chest: Effort normal and breath sounds normal. No tachypnea and no bradypnea.   Abdominal: Soft. Bowel sounds are normal. He exhibits no distension and no mass.   Musculoskeletal: Normal range of motion.         General: No edema.   Lymphadenopathy:     He has no cervical adenopathy.     He has no axillary adenopathy.   Neurological: He is alert and oriented to person, place, and time. No cranial nerve deficit.   Skin: Skin is warm and dry. No erythema.   THIN SKIN, BRUISES   Psychiatric: He has a normal mood and affect. His speech is normal and behavior is normal.               ..    Chemistry        Component Value Date/Time     04/20/2020 1407    K 4.1 04/20/2020 1407     04/20/2020 1407    CO2 26 04/20/2020 1407    BUN 14 04/20/2020 1407    CREATININE 1.17 04/20/2020 1407    GLU 83 04/20/2020 1407        Component Value Date/Time    CALCIUM 9.7 04/20/2020 1407    ALKPHOS 78 04/20/2020 1407    AST 27 04/20/2020 1407    AST 33 02/18/2016 1158    ALT 14 04/20/2020 1407    BILITOT 1.7 (H) 04/20/2020 1407    ESTGFRAFRICA >60 04/20/2020 1407    EGFRNONAA 58 (A) 04/20/2020 1407            ..  Lab Results   Component Value Date    CHOL 94 (L) 07/06/2020    CHOL 93 (L) 01/15/2019    CHOL 114 (L) 03/17/2017     Lab Results   Component Value Date    HDL 65 07/06/2020    HDL 37 (L) 01/15/2019    HDL 40 03/17/2017     Lab Results   Component Value Date    LDLCALC 12.2 (L) 07/06/2020    LDLCALC 42.4 (L) 01/15/2019    LDLCALC 65.8 03/17/2017     Lab Results   Component Value Date    TRIG 84 07/06/2020    TRIG 68 01/15/2019    TRIG 41 03/17/2017     Lab Results   Component Value Date    CHOLHDL 69.1 (H) 07/06/2020    CHOLHDL 39.8 01/15/2019    CHOLHDL 35.1 03/17/2017     ..  Lab Results   Component Value Date    WBC 4.97 04/20/2020    HGB 13.6 (L) 04/20/2020    HCT 42.0 04/20/2020    MCV 94 04/20/2020    PLT 80 (L) 04/20/2020       Test(s)  Reviewed  I have reviewed the following in detail:  [] Stress test   [] Angiography   [] Echocardiogram   [x] Labs   [] Other:       Assessment:         ICD-10-CM ICD-9-CM   1. Coronary artery disease involving coronary bypass graft of native heart with other forms of angina pectoris  I25.708 414.05     413.9   2. Long term (current) use of antithrombotics/antiplatelets  Z79.02 V58.63   3. Essential hypertension  I10 401.9   4. Hypercholesterolemia  E78.00 272.0     Problem List Items Addressed This Visit        Cardiac/Vascular    Essential hypertension    Relevant Orders    Comprehensive metabolic panel    Coronary artery disease - Primary    Relevant Orders    Comprehensive metabolic panel    Hypercholesterolemia    Relevant Orders    Comprehensive metabolic panel    Lipid Panel       Hematology    Long term (current) use of antithrombotics/antiplatelets    Relevant Orders    Comprehensive metabolic panel           Plan:     ELIZABETH HERRING, DAILY ASPIRIN DECREASE LIPITOR TO 10 MG, VERY LONG DISCUSSION ABOUT THE MEDICATIONS,ALL CV CLINICALLY STABLE, CLASS 1 ANGINA, NO HF, NO TIA, NO CLINICAL ARRHYTHMIA,CONTINUE CURRENT MEDS, EDUCATION, DIET, EXERCISE, STAY ACTIVE FOLLOW-UP WITH PCP AND ONCOLOGY REGARDING MEDS, RETURN TO CLINIC IN 6 MO WITH LABS      Coronary artery disease involving coronary bypass graft of native heart with other forms of angina pectoris  -     Comprehensive metabolic panel; Future; Expected date: 01/10/2021    Long term (current) use of antithrombotics/antiplatelets  -     Comprehensive metabolic panel; Future; Expected date: 01/10/2021    Essential hypertension  -     Comprehensive metabolic panel; Future; Expected date: 01/10/2021    Hypercholesterolemia  -     Comprehensive metabolic panel; Future; Expected date: 01/10/2021  -     Lipid Panel; Future; Expected date: 01/10/2021    Other orders  -     atorvastatin (LIPITOR) 10 MG tablet; Take 1 tablet (10 mg total) by mouth every evening.   Dispense: 90 tablet; Refill: 1    RTC Low level/low impact aerobic exercise 5x's/wk. Heart healthy diet and risk factor modification.    See labs and med orders.    Aerobic exercise 5x's/wk. Heart healthy diet and risk factor modification.    See labs and med orders.

## 2020-09-01 NOTE — TELEPHONE ENCOUNTER
----- Message from Agueda Bejarano sent at 9/1/2020  9:50 AM CDT -----  Contact: call   pt [phone]    Type: Needs Medical Advice  Who Called:  pt  Best Call Back Number:call   pt [phone]  Additional Information:pt is  calling to  see if their is a recall  on call   amplodpine // pt  was  informed  by a  friend// please call  for details

## 2020-09-01 NOTE — TELEPHONE ENCOUNTER
Poke to pt and advised form of amlodipine pt is taking is not being recalled; pt expressed understanding

## 2020-09-28 NOTE — TELEPHONE ENCOUNTER
----- Message from Vineet Staton sent at 9/28/2020 10:37 AM CDT -----  Contact: pt son Jan  Type: Needs Medical Advice  Who Called:  Jan    [facility] Call Back Number: [phone] or [phone]  Additional Information: needing the office to call and discuss the patient's medications and instructions. The patient seems to be confused on what and when he is to be taking. Please call to advise

## 2020-12-02 NOTE — TELEPHONE ENCOUNTER
Spoke to pt daughter Norberto inform her to schedule appt with PCP per Dr Clemente order . Understanding was verbalized   Sukhi Clemente MD  You 5 minutes ago (4:11 PM)     See PCP 1st to check joints if gets worse might need to go to emergency room    Message text

## 2020-12-02 NOTE — TELEPHONE ENCOUNTER
Spoke to pt daughter Norberto,she indicates that L hand is very swollen, pt complain of pain on L hand 5/10, pt hand looks white,and it is cold to touch per pt daughter. Please advise . Pt denies SOB

## 2020-12-02 NOTE — TELEPHONE ENCOUNTER
----- Message from Conchita Nunez sent at 12/2/2020  3:54 PM CST -----  Type: Needs Medical Advice    Who Called:  Patient  Best Call Back Number: [phone]  Additional Information:  Daughter requesting to speak with nurse concerning patient/stated she noticed one of his hands is swollen and  very white/please call back to advise.

## 2020-12-07 NOTE — PROGRESS NOTES
Jan Kelly was seen on 12/07/2020 for a hearing aid consult. Patient's audiogram was discussed in detail. We discussed the different sizes and levels of technology and decided based on the patients lifestyle that the best choice would be Phonak Audeo P70 or 90 in color P7 with size 1M receivers AU. The price was quoted with tax for 2 aids. Pt was informed that the hearing test would be valid for 6 months for the purchase of hearing aids and that they would need to pay for the hearing aids in full and be reimbursed by their insurance company for any hearing aid benefits they may have. His daughter will call [employer] to determine any HA benefits and call the clinic if they wish to order aids.

## 2020-12-07 NOTE — TELEPHONE ENCOUNTER
Refills for amlodipine.5mg, lipitor 10 mg, lasix 20mg, protonix 40 mg, ramipril 5 mg, metoprolol succinate 50 mg were [facility] to [facility] in [place] on [address] per pt daughter, (Norberto), request.

## 2020-12-07 NOTE — PROGRESS NOTES
Subjective:       Patient ID: Jan Kelly is a 80 y.o. male.    Chief Complaint: Cerumen Impaction    HPI   Patient is new to ENT, self-referred for trouble hearing. Patient reports increased trouble hearing X few months. He denies otalgia or otorrhea. He denies other ENT symptoms or concerns at this time. He worked as a . (+)Noise exposure. His father was hard of hearing.     Review of Systems   Constitutional: Negative.    HENT: Positive for hearing loss.    Eyes: Negative.    Respiratory: Negative.    Cardiovascular: Negative.    Gastrointestinal: Negative.    Musculoskeletal: Negative.    Integumentary:  Negative.   Neurological: Negative.    Hematological: Negative.    Psychiatric/Behavioral: Negative.          Objective:      Physical Exam  Vitals signs and nursing note reviewed.   Constitutional:       General: He is not in acute distress.     Appearance: He is well-developed. He is not ill-appearing or diaphoretic.   HENT:      Head: Normocephalic and atraumatic.      Right Ear: Hearing, tympanic membrane, ear canal and external ear normal. No middle ear effusion. Tympanic membrane is not erythematous.      Left Ear: Hearing, tympanic membrane, ear canal and external ear normal.  No middle ear effusion. Tympanic membrane is not erythematous.      Nose: Nose normal.      Mouth/Throat:      Pharynx: Uvula midline.   Eyes:      General: Lids are normal. No scleral icterus.        Right eye: No discharge.         Left eye: No discharge.   Neck:      Musculoskeletal: Normal range of motion and neck supple.      Trachea: Trachea normal. No tracheal deviation.   Cardiovascular:      Rate and Rhythm: Normal rate.   Pulmonary:      Effort: Pulmonary effort is normal. No respiratory distress.      Breath sounds: No stridor. No wheezing.   Musculoskeletal: Normal range of motion.   Skin:     General: Skin is warm and dry.      Coloration: Skin is not pale.   Neurological:      Mental Status: He is alert  and oriented to person, place, and time.      Coordination: Coordination normal.      Gait: Gait normal.   Psychiatric:         Speech: Speech normal.         Behavior: Behavior normal. Behavior is cooperative.         Thought Content: Thought content normal.         Judgment: Judgment normal.       SEPARATE PROCEDURE IN OFFICE:   Procedure: Removal of impacted cerumen, bilateral   Pre Procedure Diagnosis: Cerumen Impaction   Post Procedure Diagnosis: Cerumen Impaction   Verbal informed consent in regards to risk of trauma to ear canal, ear drum or hearing, discomfort during procedure and/or inability to remove cerumen impaction in one session or unforeseen events or complications.   No anesthesia.     Procedure in detail:   Ear canal visualized bilateral with appropriate size ear speculum utilizing Operating Head Binocular Otomicroscope   Utilizing the following:  Ring curet, delicate alligator forceps, and/or suction cannula was used. The impacted cerumen of the ear canals was removed atraumatically. The TM and EAC were then inspected and found to be clear of wax. See description of TMs/EACs in PE above.   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 The patient's hearing loss is not due to temporarily, correctable physical condition. There are no contraindications to hearing aid candidacy. Patient's audiogram reveals the patient is a candidate for amplification. Audiogram is reviewed in detail with the patient. The audiologist's recommendation that the patient have amplification/hearing aids is discussed and questions answered. Patient has been given information by the audiologist on how to schedule a hearing aid consultation. Patient is encouraged to wear ear protection in loud noise and return annually for hearing test. Return to  clinic as needed for further ENT concerns.

## 2020-12-07 NOTE — TELEPHONE ENCOUNTER
----- Message from Mayra Byrne sent at 12/7/2020  2:09 PM CST -----  Regarding: Return Call  Type: Needs Medical Advice  Who Called:  Pt Kala Thornton  Best Call Back Number: [phone]  Additional Information: pt returning office call

## 2020-12-14 NOTE — PROGRESS NOTES
[1:55 PM] Jael Stokes      MRN [id number] rafaela davis wants the port flush changed from 1/13 to either 1/4 or 1/11 he will be here on those days. if you would like to change i will print and give a new schedule to them     ty    ?[1:57 PM] Jennifer Walters      done

## 2020-12-17 NOTE — TELEPHONE ENCOUNTER
Daughter called her to state her dad (Jan), was suddenly weak lightheaded and dizzy. Still dizzy and lightheaded. Having to hold onto wall for support. B/p 140/76 pulse 55. Recommended ED now for evaluation.    Reason for Disposition   SEVERE dizziness (e.g., unable to stand, requires support to walk, feels like passing out now)    Additional Information   Negative: Shock suspected (e.g., cold/pale/clammy skin, too weak to stand, low BP, rapid pulse)   Negative: Difficult to awaken or acting confused (e.g., disoriented, slurred speech)   Negative: Fainted, and still feels dizzy afterwards   Negative: Severe difficulty breathing (e.g., struggling for each breath, speaks in single words)   Negative: Overdose (accidental or intentional) of medications   Negative: New neurologic deficit that is present now: * Weakness of the face, arm, or leg on one side of the body * Numbness of the face, arm, or leg on one side of the body * Loss of speech or garbled speech   Negative: Heart beating < 50 beats per minute OR > 140 beats per minute   Negative: Sounds like a life-threatening emergency to the triager   Negative: Chest pain   Negative: Rectal bleeding, bloody stool, or tarry-black stool   Negative: Vomiting is the main symptom   Negative: Diarrhea is the main symptom   Negative: Headache is the main symptom   Negative: Heat exhaustion suspected (i.e., dehydration from heat exposure)   Negative: Patient states that he/she is having an anxiety/panic attack    Protocols used: DIZZINESS-A-OH

## 2020-12-17 NOTE — TELEPHONE ENCOUNTER
Spoke to pt over the phone, pt states that he was felling very dizzy and lightheaded . Pt states that his BP was 190/80, he took an aspirin , after taking aspirin BP was 140/76, HR 55. Pt is on his way to [facility] ER .

## 2021-01-01 ENCOUNTER — LAB VISIT (OUTPATIENT)
Dept: LAB | Facility: HOSPITAL | Age: 81
End: 2021-01-01
Attending: INTERNAL MEDICINE
Payer: MEDICARE

## 2021-01-01 ENCOUNTER — INFUSION (OUTPATIENT)
Dept: INFUSION THERAPY | Facility: HOSPITAL | Age: 81
End: 2021-01-01
Attending: INTERNAL MEDICINE
Payer: MEDICARE

## 2021-01-01 ENCOUNTER — TELEPHONE (OUTPATIENT)
Dept: NEUROLOGY | Facility: CLINIC | Age: 81
End: 2021-01-01

## 2021-01-01 ENCOUNTER — OFFICE VISIT (OUTPATIENT)
Dept: CARDIOLOGY | Facility: CLINIC | Age: 81
End: 2021-01-01
Payer: MEDICARE

## 2021-01-01 ENCOUNTER — INFUSION (OUTPATIENT)
Dept: INFUSION THERAPY | Facility: HOSPITAL | Age: 81
End: 2021-01-01
Attending: PHYSICIAN ASSISTANT
Payer: MEDICARE

## 2021-01-01 ENCOUNTER — PATIENT MESSAGE (OUTPATIENT)
Dept: CARDIOLOGY | Facility: CLINIC | Age: 81
End: 2021-01-01

## 2021-01-01 ENCOUNTER — OFFICE VISIT (OUTPATIENT)
Dept: URGENT CARE | Facility: CLINIC | Age: 81
End: 2021-01-01
Payer: MEDICARE

## 2021-01-01 VITALS
SYSTOLIC BLOOD PRESSURE: 118 MMHG | BODY MASS INDEX: 25.57 KG/M2 | WEIGHT: 172.63 LBS | HEIGHT: 69 IN | DIASTOLIC BLOOD PRESSURE: 66 MMHG | HEART RATE: 68 BPM

## 2021-01-01 VITALS
BODY MASS INDEX: 25.48 KG/M2 | HEART RATE: 90 BPM | OXYGEN SATURATION: 90 % | RESPIRATION RATE: 17 BRPM | SYSTOLIC BLOOD PRESSURE: 138 MMHG | DIASTOLIC BLOOD PRESSURE: 66 MMHG | TEMPERATURE: 99 F | WEIGHT: 172 LBS | HEIGHT: 69 IN

## 2021-01-01 VITALS
RESPIRATION RATE: 18 BRPM | TEMPERATURE: 97 F | SYSTOLIC BLOOD PRESSURE: 128 MMHG | HEART RATE: 62 BPM | DIASTOLIC BLOOD PRESSURE: 69 MMHG

## 2021-01-01 DIAGNOSIS — R93.89 ABNORMAL CXR: Primary | ICD-10-CM

## 2021-01-01 DIAGNOSIS — C91.10 LEUKEMIA, LYMPHOCYTIC, CHRONIC: Primary | ICD-10-CM

## 2021-01-01 DIAGNOSIS — E78.00 HYPERCHOLESTEROLEMIA: ICD-10-CM

## 2021-01-01 DIAGNOSIS — I34.0 NON-RHEUMATIC MITRAL REGURGITATION: ICD-10-CM

## 2021-01-01 DIAGNOSIS — R05.9 COUGH: ICD-10-CM

## 2021-01-01 DIAGNOSIS — I25.708 CORONARY ARTERY DISEASE INVOLVING CORONARY BYPASS GRAFT OF NATIVE HEART WITH OTHER FORMS OF ANGINA PECTORIS: ICD-10-CM

## 2021-01-01 DIAGNOSIS — R06.02 SOB (SHORTNESS OF BREATH): ICD-10-CM

## 2021-01-01 DIAGNOSIS — I10 ESSENTIAL HYPERTENSION: ICD-10-CM

## 2021-01-01 DIAGNOSIS — Z79.02 LONG TERM (CURRENT) USE OF ANTITHROMBOTICS/ANTIPLATELETS: ICD-10-CM

## 2021-01-01 DIAGNOSIS — R50.9 FEVER, UNSPECIFIED FEVER CAUSE: ICD-10-CM

## 2021-01-01 DIAGNOSIS — I50.22 CHRONIC SYSTOLIC HEART FAILURE: ICD-10-CM

## 2021-01-01 DIAGNOSIS — C91.10 LEUKEMIA, LYMPHOCYTIC, CHRONIC: ICD-10-CM

## 2021-01-01 DIAGNOSIS — C91.12 CLL (CHRONIC LYMPHOID LEUKEMIA) IN RELAPSE: Primary | ICD-10-CM

## 2021-01-01 DIAGNOSIS — I25.10 CORONARY ARTERY DISEASE INVOLVING NATIVE CORONARY ARTERY OF NATIVE HEART WITHOUT ANGINA PECTORIS: Primary | ICD-10-CM

## 2021-01-01 LAB
ALBUMIN SERPL BCP-MCNC: 3.8 G/DL (ref 3.5–5.2)
ALBUMIN SERPL BCP-MCNC: 3.8 G/DL (ref 3.5–5.2)
ALP SERPL-CCNC: 73 U/L (ref 38–145)
ALP SERPL-CCNC: 84 U/L (ref 38–145)
ALT SERPL W/O P-5'-P-CCNC: 17 U/L (ref 0–50)
ALT SERPL W/O P-5'-P-CCNC: 18 U/L (ref 0–50)
ANION GAP SERPL CALC-SCNC: 5 MMOL/L (ref 8–16)
ANION GAP SERPL CALC-SCNC: 5 MMOL/L (ref 8–16)
AST SERPL-CCNC: 35 U/L (ref 17–59)
AST SERPL-CCNC: 46 U/L (ref 17–59)
BASOPHILS # BLD AUTO: 0.05 K/UL (ref 0–0.2)
BASOPHILS NFR BLD: 0.9 % (ref 0–1.9)
BILIRUB SERPL-MCNC: 1.1 MG/DL (ref 0.2–1.3)
BILIRUB SERPL-MCNC: 1.2 MG/DL (ref 0.2–1.3)
CALCIUM SERPL-MCNC: 8.8 MG/DL (ref 8.4–10.2)
CALCIUM SERPL-MCNC: 9.1 MG/DL (ref 8.4–10.2)
CHLORIDE SERPL-SCNC: 100 MMOL/L (ref 95–110)
CHLORIDE SERPL-SCNC: 101 MMOL/L (ref 95–110)
CHOLEST SERPL-MCNC: 74 MG/DL (ref 120–199)
CHOLEST/HDLC SERPL: 2.6 {RATIO} (ref 2–5)
CO2 SERPL-SCNC: 31 MMOL/L (ref 22–31)
CO2 SERPL-SCNC: 33 MMOL/L (ref 22–31)
CREAT SERPL-MCNC: 1.25 MG/DL (ref 0.5–1.4)
CREAT SERPL-MCNC: 1.28 MG/DL (ref 0.5–1.4)
CTP QC/QA: YES
DIFFERENTIAL METHOD: ABNORMAL
EOSINOPHIL # BLD AUTO: 0.1 K/UL (ref 0–0.5)
EOSINOPHIL NFR BLD: 1.8 % (ref 0–8)
ERYTHROCYTE [DISTWIDTH] IN BLOOD BY AUTOMATED COUNT: 15.1 % (ref 11.5–14.5)
EST. GFR  (AFRICAN AMERICAN): 60 ML/MIN/1.73 M^2
EST. GFR  (AFRICAN AMERICAN): >60 ML/MIN/1.73 M^2
EST. GFR  (NON AFRICAN AMERICAN): 52 ML/MIN/1.73 M^2
EST. GFR  (NON AFRICAN AMERICAN): 53 ML/MIN/1.73 M^2
GLUCOSE SERPL-MCNC: 103 MG/DL (ref 70–110)
GLUCOSE SERPL-MCNC: 117 MG/DL (ref 70–110)
HCT VFR BLD AUTO: 44.5 % (ref 40–54)
HDLC SERPL-MCNC: 28 MG/DL (ref 40–75)
HDLC SERPL: 37.8 % (ref 20–50)
HGB BLD-MCNC: 14.2 G/DL (ref 14–18)
IMM GRANULOCYTES # BLD AUTO: 0.17 K/UL (ref 0–0.04)
IMM GRANULOCYTES NFR BLD AUTO: 3 % (ref 0–0.5)
LDH SERPL L TO P-CCNC: 396 U/L (ref 313–618)
LDLC SERPL CALC-MCNC: 33 MG/DL (ref 63–159)
LYMPHOCYTES # BLD AUTO: 1.1 K/UL (ref 1–4.8)
LYMPHOCYTES NFR BLD: 20.1 % (ref 18–48)
MCH RBC QN AUTO: 29.8 PG (ref 27–31)
MCHC RBC AUTO-ENTMCNC: 31.9 G/DL (ref 32–36)
MCV RBC AUTO: 94 FL (ref 82–98)
MONOCYTES # BLD AUTO: 0.7 K/UL (ref 0.3–1)
MONOCYTES NFR BLD: 12.4 % (ref 4–15)
NEUTROPHILS # BLD AUTO: 3.5 K/UL (ref 1.8–7.7)
NEUTROPHILS NFR BLD: 61.8 % (ref 38–73)
NONHDLC SERPL-MCNC: 46 MG/DL
NRBC BLD-RTO: 0 /100 WBC
PLATELET # BLD AUTO: 85 K/UL (ref 150–350)
PMV BLD AUTO: 12.7 FL (ref 9.2–12.9)
POTASSIUM SERPL-SCNC: 3.9 MMOL/L (ref 3.5–5.1)
POTASSIUM SERPL-SCNC: 4.3 MMOL/L (ref 3.5–5.1)
PROT SERPL-MCNC: 5.6 G/DL (ref 6–8.4)
PROT SERPL-MCNC: 5.7 G/DL (ref 6–8.4)
RBC # BLD AUTO: 4.76 M/UL (ref 4.6–6.2)
SARS-COV-2 RDRP RESP QL NAA+PROBE: NEGATIVE
SODIUM SERPL-SCNC: 136 MMOL/L (ref 136–145)
SODIUM SERPL-SCNC: 139 MMOL/L (ref 136–145)
TRIGL SERPL-MCNC: 65 MG/DL (ref 30–150)
UUN UR-MCNC: 18 MG/DL (ref 9–21)
UUN UR-MCNC: 22 MG/DL (ref 9–21)
WBC # BLD AUTO: 5.66 K/UL (ref 3.9–12.7)

## 2021-01-01 PROCEDURE — A4216 STERILE WATER/SALINE, 10 ML: HCPCS | Mod: PN | Performed by: INTERNAL MEDICINE

## 2021-01-01 PROCEDURE — 80061 LIPID PANEL: CPT

## 2021-01-01 PROCEDURE — 80053 COMPREHEN METABOLIC PANEL: CPT | Mod: PN | Performed by: NURSE PRACTITIONER

## 2021-01-01 PROCEDURE — 25000003 PHARM REV CODE 250: Mod: PN | Performed by: INTERNAL MEDICINE

## 2021-01-01 PROCEDURE — U0002 COVID-19 LAB TEST NON-CDC: HCPCS | Mod: QW,CR,S$GLB, | Performed by: PHYSICIAN ASSISTANT

## 2021-01-01 PROCEDURE — 99213 OFFICE O/P EST LOW 20 MIN: CPT | Mod: PBBFAC,PO | Performed by: INTERNAL MEDICINE

## 2021-01-01 PROCEDURE — 80053 COMPREHEN METABOLIC PANEL: CPT

## 2021-01-01 PROCEDURE — 25000003 PHARM REV CODE 250: Mod: PN | Performed by: PHYSICIAN ASSISTANT

## 2021-01-01 PROCEDURE — 99214 PR OFFICE/OUTPT VISIT, EST, LEVL IV, 30-39 MIN: ICD-10-PCS | Mod: S$PBB,,, | Performed by: INTERNAL MEDICINE

## 2021-01-01 PROCEDURE — 99214 OFFICE O/P EST MOD 30 MIN: CPT | Mod: S$GLB,,, | Performed by: PHYSICIAN ASSISTANT

## 2021-01-01 PROCEDURE — 96523 IRRIG DRUG DELIVERY DEVICE: CPT | Mod: PN

## 2021-01-01 PROCEDURE — 71046 XR CHEST PA AND LATERAL: ICD-10-PCS | Mod: S$GLB,,, | Performed by: RADIOLOGY

## 2021-01-01 PROCEDURE — 99999 PR PBB SHADOW E&M-EST. PATIENT-LVL III: CPT | Mod: PBBFAC,,, | Performed by: INTERNAL MEDICINE

## 2021-01-01 PROCEDURE — 80053 COMPREHEN METABOLIC PANEL: CPT | Mod: PN

## 2021-01-01 PROCEDURE — 99999 PR PBB SHADOW E&M-EST. PATIENT-LVL III: ICD-10-PCS | Mod: PBBFAC,,, | Performed by: INTERNAL MEDICINE

## 2021-01-01 PROCEDURE — 71046 X-RAY EXAM CHEST 2 VIEWS: CPT | Mod: S$GLB,,, | Performed by: RADIOLOGY

## 2021-01-01 PROCEDURE — 63600175 PHARM REV CODE 636 W HCPCS: Mod: PN | Performed by: PHYSICIAN ASSISTANT

## 2021-01-01 PROCEDURE — 80061 LIPID PANEL: CPT | Mod: PN

## 2021-01-01 PROCEDURE — 83615 LACTATE (LD) (LDH) ENZYME: CPT | Mod: PN | Performed by: NURSE PRACTITIONER

## 2021-01-01 PROCEDURE — 85025 COMPLETE CBC W/AUTO DIFF WBC: CPT | Mod: PN | Performed by: NURSE PRACTITIONER

## 2021-01-01 PROCEDURE — 36415 COLL VENOUS BLD VENIPUNCTURE: CPT | Mod: PN

## 2021-01-01 PROCEDURE — 80053 COMPREHEN METABOLIC PANEL: CPT | Performed by: NURSE PRACTITIONER

## 2021-01-01 PROCEDURE — 99214 PR OFFICE/OUTPT VISIT, EST, LEVL IV, 30-39 MIN: ICD-10-PCS | Mod: S$GLB,,, | Performed by: PHYSICIAN ASSISTANT

## 2021-01-01 PROCEDURE — 99214 OFFICE O/P EST MOD 30 MIN: CPT | Mod: S$PBB,,, | Performed by: INTERNAL MEDICINE

## 2021-01-01 PROCEDURE — 83615 LACTATE (LD) (LDH) ENZYME: CPT | Performed by: NURSE PRACTITIONER

## 2021-01-01 PROCEDURE — 63600175 PHARM REV CODE 636 W HCPCS: Mod: PN | Performed by: INTERNAL MEDICINE

## 2021-01-01 PROCEDURE — A4216 STERILE WATER/SALINE, 10 ML: HCPCS | Mod: PN | Performed by: PHYSICIAN ASSISTANT

## 2021-01-01 PROCEDURE — U0002: ICD-10-PCS | Mod: QW,CR,S$GLB, | Performed by: PHYSICIAN ASSISTANT

## 2021-01-01 PROCEDURE — 85025 COMPLETE CBC W/AUTO DIFF WBC: CPT | Performed by: NURSE PRACTITIONER

## 2021-01-01 RX ORDER — FUROSEMIDE 20 MG/1
TABLET ORAL
Qty: 15 TABLET | Refills: 1 | Status: SHIPPED | OUTPATIENT
Start: 2021-01-01 | End: 2021-01-01

## 2021-01-01 RX ORDER — HEPARIN 100 UNIT/ML
500 SYRINGE INTRAVENOUS
Status: COMPLETED | OUTPATIENT
Start: 2021-01-01 | End: 2021-01-01

## 2021-01-01 RX ORDER — SODIUM CHLORIDE 0.9 % (FLUSH) 0.9 %
10 SYRINGE (ML) INJECTION
Status: CANCELLED
Start: 2021-01-01

## 2021-01-01 RX ORDER — SODIUM CHLORIDE 0.9 % (FLUSH) 0.9 %
10 SYRINGE (ML) INJECTION
Status: DISCONTINUED | OUTPATIENT
Start: 2021-01-01 | End: 2021-01-01 | Stop reason: HOSPADM

## 2021-01-01 RX ORDER — METOPROLOL SUCCINATE 50 MG/1
50 TABLET, EXTENDED RELEASE ORAL DAILY
Qty: 90 TABLET | Refills: 1 | Status: ON HOLD | OUTPATIENT
Start: 2021-01-01 | End: 2021-01-01 | Stop reason: HOSPADM

## 2021-01-01 RX ORDER — HEPARIN 100 UNIT/ML
500 SYRINGE INTRAVENOUS
Status: CANCELLED | OUTPATIENT
Start: 2021-01-01

## 2021-01-01 RX ORDER — SODIUM CHLORIDE 0.9 % (FLUSH) 0.9 %
10 SYRINGE (ML) INJECTION
Status: CANCELLED | OUTPATIENT
Start: 2021-01-01

## 2021-01-01 RX ORDER — HEPARIN 100 UNIT/ML
500 SYRINGE INTRAVENOUS
Status: DISCONTINUED | OUTPATIENT
Start: 2021-01-01 | End: 2021-01-01 | Stop reason: HOSPADM

## 2021-01-01 RX ORDER — RAMIPRIL 5 MG/1
5 CAPSULE ORAL DAILY
Qty: 90 CAPSULE | Refills: 1 | Status: SHIPPED | OUTPATIENT
Start: 2021-01-01 | End: 2021-01-01

## 2021-01-01 RX ORDER — AMLODIPINE BESYLATE 2.5 MG/1
TABLET ORAL
Qty: 90 TABLET | Refills: 1 | Status: SHIPPED | OUTPATIENT
Start: 2021-01-01 | End: 2021-01-01

## 2021-01-01 RX ORDER — ATORVASTATIN CALCIUM 10 MG/1
10 TABLET, FILM COATED ORAL NIGHTLY
Qty: 90 TABLET | Refills: 1 | Status: ON HOLD | OUTPATIENT
Start: 2021-01-01 | End: 2021-01-01 | Stop reason: HOSPADM

## 2021-01-01 RX ADMIN — HEPARIN 500 UNITS: 100 SYRINGE at 04:01

## 2021-01-01 RX ADMIN — HEPARIN 500 UNITS: 100 SYRINGE at 01:03

## 2021-01-01 RX ADMIN — HEPARIN 500 UNITS: 100 SYRINGE at 02:05

## 2021-01-01 RX ADMIN — Medication 10 ML: at 04:01

## 2021-01-01 RX ADMIN — Medication 10 ML: at 01:03

## 2021-05-23 PROBLEM — J96.01 ACUTE HYPOXEMIC RESPIRATORY FAILURE: Status: ACTIVE | Noted: 2021-01-01

## 2021-05-24 PROBLEM — N30.00 ACUTE CYSTITIS WITHOUT HEMATURIA: Status: ACTIVE | Noted: 2021-01-01

## 2021-05-24 PROBLEM — E11.9 TYPE 2 DIABETES MELLITUS WITHOUT COMPLICATION, WITHOUT LONG-TERM CURRENT USE OF INSULIN: Status: RESOLVED | Noted: 2017-07-24 | Resolved: 2021-01-01

## 2021-05-25 PROBLEM — I25.10 CORONARY ARTERY DISEASE: Status: RESOLVED | Noted: 2017-03-23 | Resolved: 2021-01-01

## 2021-06-01 PROBLEM — J12.9 VIRAL PNEUMONIA: Status: ACTIVE | Noted: 2021-01-01

## 2021-06-02 PROBLEM — K56.7 ILEUS: Status: ACTIVE | Noted: 2021-01-01

## 2021-06-05 PROBLEM — E87.0 HYPERNATREMIA: Status: ACTIVE | Noted: 2021-01-01

## 2021-06-06 PROBLEM — J69.0 ASPIRATION PNEUMONIA: Status: ACTIVE | Noted: 2021-01-01

## 2021-06-12 PROBLEM — K56.7 ILEUS: Status: RESOLVED | Noted: 2021-01-01 | Resolved: 2021-01-01

## 2021-06-13 PROBLEM — R06.02 SOB (SHORTNESS OF BREATH): Status: RESOLVED | Noted: 2018-12-14 | Resolved: 2021-01-01

## 2021-06-13 PROBLEM — E87.0 HYPERNATREMIA: Status: RESOLVED | Noted: 2021-01-01 | Resolved: 2021-01-01

## 2021-06-13 PROBLEM — E87.6 HYPOKALEMIA: Status: RESOLVED | Noted: 2017-07-30 | Resolved: 2021-01-01

## 2021-06-30 RX ORDER — FUROSEMIDE 20 MG/1
TABLET ORAL
Qty: 15 TABLET | Refills: 2 | OUTPATIENT
Start: 2021-06-30